# Patient Record
Sex: FEMALE | Race: WHITE | NOT HISPANIC OR LATINO | Employment: OTHER | URBAN - NONMETROPOLITAN AREA
[De-identification: names, ages, dates, MRNs, and addresses within clinical notes are randomized per-mention and may not be internally consistent; named-entity substitution may affect disease eponyms.]

---

## 2019-12-28 ENCOUNTER — APPOINTMENT (EMERGENCY)
Dept: RADIOLOGY | Facility: HOSPITAL | Age: 59
DRG: 602 | End: 2019-12-28
Payer: MEDICARE

## 2019-12-28 ENCOUNTER — HOSPITAL ENCOUNTER (INPATIENT)
Facility: HOSPITAL | Age: 59
LOS: 14 days | Discharge: NURSING, REHAB OR LONG-TERM CARE | DRG: 602 | End: 2020-01-11
Attending: EMERGENCY MEDICINE | Admitting: INTERNAL MEDICINE
Payer: MEDICARE

## 2019-12-28 ENCOUNTER — APPOINTMENT (EMERGENCY)
Dept: CT IMAGING | Facility: HOSPITAL | Age: 59
DRG: 602 | End: 2019-12-28
Payer: MEDICARE

## 2019-12-28 ENCOUNTER — APPOINTMENT (INPATIENT)
Dept: CT IMAGING | Facility: HOSPITAL | Age: 59
DRG: 602 | End: 2019-12-28
Payer: MEDICARE

## 2019-12-28 DIAGNOSIS — G47.33 OSA (OBSTRUCTIVE SLEEP APNEA): ICD-10-CM

## 2019-12-28 DIAGNOSIS — J96.01 ACUTE RESPIRATORY FAILURE WITH HYPOXIA AND HYPERCAPNIA (HCC): ICD-10-CM

## 2019-12-28 DIAGNOSIS — R77.8 ELEVATED TROPONIN LEVEL: ICD-10-CM

## 2019-12-28 DIAGNOSIS — J96.02 ACUTE RESPIRATORY FAILURE WITH HYPOXIA AND HYPERCAPNIA (HCC): ICD-10-CM

## 2019-12-28 DIAGNOSIS — L02.91 SOFT TISSUE ABSCESS: ICD-10-CM

## 2019-12-28 DIAGNOSIS — R94.31 PROLONGED QT INTERVAL: ICD-10-CM

## 2019-12-28 DIAGNOSIS — E03.9 ACQUIRED HYPOTHYROIDISM: ICD-10-CM

## 2019-12-28 DIAGNOSIS — R23.8 SKIN BREAKDOWN: ICD-10-CM

## 2019-12-28 DIAGNOSIS — E66.01 MORBID OBESITY WITH BMI OF 70 AND OVER, ADULT (HCC): ICD-10-CM

## 2019-12-28 DIAGNOSIS — E66.9 OBESITY: ICD-10-CM

## 2019-12-28 DIAGNOSIS — R60.9 EDEMA: ICD-10-CM

## 2019-12-28 DIAGNOSIS — L03.115 CELLULITIS OF RIGHT LEG: Primary | ICD-10-CM

## 2019-12-28 DIAGNOSIS — E53.8 FOLIC ACID DEFICIENCY: ICD-10-CM

## 2019-12-28 PROBLEM — R41.82 ALTERED MENTAL STATUS: Status: ACTIVE | Noted: 2019-12-28

## 2019-12-28 PROBLEM — I87.8 VENOUS STASIS OF LOWER EXTREMITY: Status: ACTIVE | Noted: 2019-12-28

## 2019-12-28 PROBLEM — I89.0 LYMPHEDEMA: Status: ACTIVE | Noted: 2019-12-28

## 2019-12-28 PROBLEM — R74.01 TRANSAMINITIS: Status: ACTIVE | Noted: 2019-12-28

## 2019-12-28 PROBLEM — L03.90 CELLULITIS: Status: ACTIVE | Noted: 2019-12-28

## 2019-12-28 LAB
ALBUMIN SERPL BCP-MCNC: 2.5 G/DL (ref 3.5–5)
ALP SERPL-CCNC: 112 U/L (ref 46–116)
ALT SERPL W P-5'-P-CCNC: 176 U/L (ref 12–78)
ANION GAP SERPL CALCULATED.3IONS-SCNC: 7 MMOL/L (ref 4–13)
APTT PPP: 38 SECONDS (ref 23–37)
AST SERPL W P-5'-P-CCNC: 355 U/L (ref 5–45)
BACTERIA UR QL AUTO: ABNORMAL /HPF
BASOPHILS # BLD AUTO: 0.07 THOUSANDS/ΜL (ref 0–0.1)
BASOPHILS NFR BLD AUTO: 1 % (ref 0–1)
BILIRUB SERPL-MCNC: 1.76 MG/DL (ref 0.2–1)
BILIRUB UR QL STRIP: ABNORMAL
BUN SERPL-MCNC: 16 MG/DL (ref 5–25)
CALCIUM SERPL-MCNC: 8.2 MG/DL (ref 8.3–10.1)
CHLORIDE SERPL-SCNC: 98 MMOL/L (ref 100–108)
CLARITY UR: ABNORMAL
CO2 SERPL-SCNC: 34 MMOL/L (ref 21–32)
COLOR UR: YELLOW
CREAT SERPL-MCNC: 1.3 MG/DL (ref 0.6–1.3)
EOSINOPHIL # BLD AUTO: 0.23 THOUSAND/ΜL (ref 0–0.61)
EOSINOPHIL NFR BLD AUTO: 2 % (ref 0–6)
ERYTHROCYTE [DISTWIDTH] IN BLOOD BY AUTOMATED COUNT: 16.2 % (ref 11.6–15.1)
FLUAV RNA NPH QL NAA+PROBE: NORMAL
FLUBV RNA NPH QL NAA+PROBE: NORMAL
GFR SERPL CREATININE-BSD FRML MDRD: 45 ML/MIN/1.73SQ M
GLUCOSE SERPL-MCNC: 93 MG/DL (ref 65–140)
GLUCOSE UR STRIP-MCNC: NEGATIVE MG/DL
HCT VFR BLD AUTO: 42.7 % (ref 34.8–46.1)
HGB BLD-MCNC: 12 G/DL (ref 11.5–15.4)
HGB UR QL STRIP.AUTO: ABNORMAL
IMM GRANULOCYTES # BLD AUTO: 0.03 THOUSAND/UL (ref 0–0.2)
IMM GRANULOCYTES NFR BLD AUTO: 0 % (ref 0–2)
INR PPP: 1.31 (ref 0.84–1.19)
KETONES UR STRIP-MCNC: NEGATIVE MG/DL
LACTATE SERPL-SCNC: 1.8 MMOL/L (ref 0.5–2)
LEUKOCYTE ESTERASE UR QL STRIP: ABNORMAL
LYMPHOCYTES # BLD AUTO: 0.91 THOUSANDS/ΜL (ref 0.6–4.47)
LYMPHOCYTES NFR BLD AUTO: 9 % (ref 14–44)
MCH RBC QN AUTO: 23.9 PG (ref 26.8–34.3)
MCHC RBC AUTO-ENTMCNC: 28.1 G/DL (ref 31.4–37.4)
MCV RBC AUTO: 85 FL (ref 82–98)
MONOCYTES # BLD AUTO: 0.81 THOUSAND/ΜL (ref 0.17–1.22)
MONOCYTES NFR BLD AUTO: 8 % (ref 4–12)
NEUTROPHILS # BLD AUTO: 7.64 THOUSANDS/ΜL (ref 1.85–7.62)
NEUTS SEG NFR BLD AUTO: 80 % (ref 43–75)
NITRITE UR QL STRIP: NEGATIVE
NON-SQ EPI CELLS URNS QL MICRO: ABNORMAL /HPF
NRBC BLD AUTO-RTO: 0 /100 WBCS
PH UR STRIP.AUTO: 6.5 [PH]
PLATELET # BLD AUTO: 318 THOUSANDS/UL (ref 149–390)
PLATELET # BLD AUTO: 373 THOUSANDS/UL (ref 149–390)
PMV BLD AUTO: 10.1 FL (ref 8.9–12.7)
PMV BLD AUTO: 9.8 FL (ref 8.9–12.7)
POTASSIUM SERPL-SCNC: 3.9 MMOL/L (ref 3.5–5.3)
PROT SERPL-MCNC: 8 G/DL (ref 6.4–8.2)
PROT UR STRIP-MCNC: NEGATIVE MG/DL
PROTHROMBIN TIME: 16.3 SECONDS (ref 11.6–14.5)
RBC # BLD AUTO: 5.02 MILLION/UL (ref 3.81–5.12)
RBC #/AREA URNS AUTO: ABNORMAL /HPF
RSV RNA NPH QL NAA+PROBE: NORMAL
SODIUM SERPL-SCNC: 139 MMOL/L (ref 136–145)
SP GR UR STRIP.AUTO: <=1.005 (ref 1–1.03)
TROPONIN I SERPL-MCNC: 0.1 NG/ML
URINE COMMENT: ABNORMAL
UROBILINOGEN UR QL STRIP.AUTO: 1 E.U./DL
WBC # BLD AUTO: 9.69 THOUSAND/UL (ref 4.31–10.16)
WBC #/AREA URNS AUTO: ABNORMAL /HPF

## 2019-12-28 PROCEDURE — 85730 THROMBOPLASTIN TIME PARTIAL: CPT | Performed by: EMERGENCY MEDICINE

## 2019-12-28 PROCEDURE — 87040 BLOOD CULTURE FOR BACTERIA: CPT | Performed by: EMERGENCY MEDICINE

## 2019-12-28 PROCEDURE — 70450 CT HEAD/BRAIN W/O DYE: CPT

## 2019-12-28 PROCEDURE — 85610 PROTHROMBIN TIME: CPT | Performed by: EMERGENCY MEDICINE

## 2019-12-28 PROCEDURE — 85049 AUTOMATED PLATELET COUNT: CPT | Performed by: INTERNAL MEDICINE

## 2019-12-28 PROCEDURE — 85025 COMPLETE CBC W/AUTO DIFF WBC: CPT | Performed by: EMERGENCY MEDICINE

## 2019-12-28 PROCEDURE — 81001 URINALYSIS AUTO W/SCOPE: CPT | Performed by: INTERNAL MEDICINE

## 2019-12-28 PROCEDURE — 93005 ELECTROCARDIOGRAM TRACING: CPT

## 2019-12-28 PROCEDURE — 87631 RESP VIRUS 3-5 TARGETS: CPT | Performed by: EMERGENCY MEDICINE

## 2019-12-28 PROCEDURE — 99223 1ST HOSP IP/OBS HIGH 75: CPT | Performed by: FAMILY MEDICINE

## 2019-12-28 PROCEDURE — 96360 HYDRATION IV INFUSION INIT: CPT

## 2019-12-28 PROCEDURE — 71046 X-RAY EXAM CHEST 2 VIEWS: CPT

## 2019-12-28 PROCEDURE — 73701 CT LOWER EXTREMITY W/DYE: CPT

## 2019-12-28 PROCEDURE — 36415 COLL VENOUS BLD VENIPUNCTURE: CPT | Performed by: EMERGENCY MEDICINE

## 2019-12-28 PROCEDURE — 80053 COMPREHEN METABOLIC PANEL: CPT | Performed by: EMERGENCY MEDICINE

## 2019-12-28 PROCEDURE — 99285 EMERGENCY DEPT VISIT HI MDM: CPT

## 2019-12-28 PROCEDURE — 84484 ASSAY OF TROPONIN QUANT: CPT | Performed by: EMERGENCY MEDICINE

## 2019-12-28 PROCEDURE — 99285 EMERGENCY DEPT VISIT HI MDM: CPT | Performed by: EMERGENCY MEDICINE

## 2019-12-28 PROCEDURE — 87086 URINE CULTURE/COLONY COUNT: CPT | Performed by: INTERNAL MEDICINE

## 2019-12-28 PROCEDURE — 83605 ASSAY OF LACTIC ACID: CPT | Performed by: EMERGENCY MEDICINE

## 2019-12-28 RX ORDER — MELATONIN
2000 DAILY
COMMUNITY

## 2019-12-28 RX ORDER — CEFTRIAXONE 1 G/50ML
1000 INJECTION, SOLUTION INTRAVENOUS EVERY 24 HOURS
Status: DISCONTINUED | OUTPATIENT
Start: 2019-12-29 | End: 2019-12-28

## 2019-12-28 RX ORDER — HYDROCODONE BITARTRATE AND ACETAMINOPHEN 5; 325 MG/1; MG/1
1 TABLET ORAL EVERY 6 HOURS PRN
COMMUNITY
End: 2020-01-11 | Stop reason: HOSPADM

## 2019-12-28 RX ORDER — LEVOTHYROXINE SODIUM 0.12 MG/1
125 TABLET ORAL DAILY
COMMUNITY
End: 2020-01-11 | Stop reason: HOSPADM

## 2019-12-28 RX ORDER — VANCOMYCIN HYDROCHLORIDE 1 G/200ML
1000 INJECTION, SOLUTION INTRAVENOUS ONCE
Status: COMPLETED | OUTPATIENT
Start: 2019-12-28 | End: 2019-12-28

## 2019-12-28 RX ORDER — BUPROPION HYDROCHLORIDE 300 MG/1
300 TABLET ORAL DAILY
COMMUNITY
End: 2020-01-11 | Stop reason: HOSPADM

## 2019-12-28 RX ORDER — CEFTRIAXONE 1 G/50ML
1000 INJECTION, SOLUTION INTRAVENOUS ONCE
Status: COMPLETED | OUTPATIENT
Start: 2019-12-28 | End: 2019-12-28

## 2019-12-28 RX ORDER — SODIUM CHLORIDE 9 MG/ML
100 INJECTION, SOLUTION INTRAVENOUS CONTINUOUS
Status: DISCONTINUED | OUTPATIENT
Start: 2019-12-28 | End: 2019-12-29

## 2019-12-28 RX ORDER — TRAMADOL HYDROCHLORIDE 50 MG/1
50 TABLET ORAL EVERY 12 HOURS PRN
COMMUNITY
End: 2020-01-11 | Stop reason: HOSPADM

## 2019-12-28 RX ORDER — FLUOXETINE 20 MG/1
20 TABLET, FILM COATED ORAL DAILY
COMMUNITY
End: 2020-06-01

## 2019-12-28 RX ADMIN — SODIUM CHLORIDE 100 ML/HR: 0.9 INJECTION, SOLUTION INTRAVENOUS at 18:00

## 2019-12-28 RX ADMIN — SODIUM CHLORIDE 1000 ML: 0.9 INJECTION, SOLUTION INTRAVENOUS at 12:59

## 2019-12-28 RX ADMIN — VANCOMYCIN HYDROCHLORIDE 1000 MG: 1 INJECTION, SOLUTION INTRAVENOUS at 15:06

## 2019-12-28 RX ADMIN — CEFTRIAXONE 1000 MG: 1 INJECTION, SOLUTION INTRAVENOUS at 14:29

## 2019-12-28 RX ADMIN — IOHEXOL 100 ML: 350 INJECTION, SOLUTION INTRAVENOUS at 22:30

## 2019-12-28 RX ADMIN — Medication 3.38 G: at 18:49

## 2019-12-28 NOTE — ED PROVIDER NOTES
History  Chief Complaint   Patient presents with    Lethargy     pt's  called ems stating pt has been slurring speech and increased weakness for past couple days  pt being tx per leg wounds by VN  denies fevers/n/v/d     Recently in the hospital for a right leg infection  Brought in now by ambulance secondary to mental status change  Periods of confusion with slurred speech  Complains of increased redness and swelling to the leg  No fevers or chills  No nausea vomiting or diarrhea  History provided by:  Patient, EMS personnel and spouse   used: No    Extremity Weakness   Severity:  Mild  Onset quality:  Gradual  Duration:  2 days  Timing:  Intermittent  Progression:  Waxing and waning  Associated symptoms: no abdominal pain, no chest pain, no cough, no diarrhea, no ear pain, no fever, no headaches, no nausea, no rash, no shortness of breath, no sore throat, no vomiting and no wheezing        None       Past Medical History:   Diagnosis Date    Disease of thyroid gland     Lymphedema     Morbid obesity due to excess calories ([address] )     Renal disorder        History reviewed  No pertinent surgical history  History reviewed  No pertinent family history  I have reviewed and agree with the history as documented  Social History     Tobacco Use    Smoking status: Never Smoker    Smokeless tobacco: Never Used   Substance Use Topics    Alcohol use: Not Currently    Drug use: Never        Review of Systems   Constitutional: Negative for chills and fever  HENT: Negative for ear pain, hearing loss, sore throat, trouble swallowing and voice change  Eyes: Negative for pain and discharge  Respiratory: Negative for cough, shortness of breath and wheezing  Cardiovascular: Negative for chest pain and palpitations  Gastrointestinal: Negative for abdominal pain, blood in stool, constipation, diarrhea, nausea and vomiting     Genitourinary: Negative for dysuria, flank pain, frequency and hematuria  Musculoskeletal: Positive for extremity weakness  Negative for joint swelling, neck pain and neck stiffness  Skin: Negative for rash and wound  Neurological: Negative for dizziness, seizures, syncope, facial asymmetry and headaches  Psychiatric/Behavioral: Negative for hallucinations, self-injury and suicidal ideas  All other systems reviewed and are negative  Physical Exam  Physical Exam   Constitutional: She is oriented to person, place, and time  She appears well-developed  Morbidly obese  Smelling of urine  HENT:   Head: Normocephalic and atraumatic  Eyes: Pupils are equal, round, and reactive to light  EOM are normal    Neck: Normal range of motion  Neck supple  Cardiovascular: Normal rate  Pulmonary/Chest: Breath sounds normal  She is in respiratory distress  Abdominal: Soft  She exhibits no distension  There is no tenderness  Pannus is slightly erythematous  Musculoskeletal:   Edema to both legs  There is erythema and warmth from the midportion of the thigh down to the lower leg on the right  Neurological: She is alert and oriented to person, place, and time  No cranial nerve deficit  Skin: Skin is warm and dry  Capillary refill takes less than 2 seconds  Psychiatric: She has a normal mood and affect         Vital Signs  ED Triage Vitals [12/28/19 1221]   Temperature Pulse Respirations Blood Pressure SpO2   97 8 °F (36 6 °C) 78 22 116/57 93 %      Temp Source Heart Rate Source Patient Position - Orthostatic VS BP Location FiO2 (%)   Oral Monitor -- -- --      Pain Score       No Pain           Vitals:    12/28/19 1221   BP: 116/57   Pulse: 78         Visual Acuity  Visual Acuity      Most Recent Value   L Pupil Size (mm)  2   R Pupil Size (mm)  2          ED Medications  Medications   cefTRIAXone (ROCEPHIN) IVPB (premix) 1,000 mg (has no administration in time range)   vancomycin 1 mg for Intravitreal Injection Only 0 1 mL (has no administration in time range)   sodium chloride 0 9 % bolus 1,000 mL (1,000 mL Intravenous New Bag 12/28/19 1259)       Diagnostic Studies  Results Reviewed     Procedure Component Value Units Date/Time    Influenza A/B and RSV PCR [[id number]]  (Normal) Collected:  12/28/19 1250    Lab Status:  Final result Specimen:  Nasopharyngeal Swab Updated:  12/28/19 1403     INFLUENZA A PCR None Detected     INFLUENZA B PCR None Detected     RSV PCR None Detected    Lactic acid, plasma [[id number]]  (Normal) Collected:  12/28/19 1250    Lab Status:  Final result Specimen:  Blood from Arm, Left Updated:  12/28/19 1326     LACTIC ACID 1 8 mmol/L     Narrative:       Result may be elevated if tourniquet was used during collection      Troponin I [[id number]]  (Abnormal) Collected:  12/28/19 1250    Lab Status:  Final result Specimen:  Blood from Arm, Left Updated:  12/28/19 1324     Troponin I 0 10 ng/mL     Protime-INR [[id number]]  (Abnormal) Collected:  12/28/19 1250    Lab Status:  Final result Specimen:  Blood from Arm, Left Updated:  12/28/19 1323     Protime 16 3 seconds      INR 1 31    APTT [[id number]]  (Abnormal) Collected:  12/28/19 1250    Lab Status:  Final result Specimen:  Blood from Arm, Left Updated:  12/28/19 1323     PTT 38 seconds     Comprehensive metabolic panel [[id number]]  (Abnormal) Collected:  12/28/19 1250    Lab Status:  Final result Specimen:  Blood from Arm, Left Updated:  12/28/19 1321     Sodium 139 mmol/L      Potassium 3 9 mmol/L      Chloride 98 mmol/L      CO2 34 mmol/L      ANION GAP 7 mmol/L      BUN 16 mg/dL      Creatinine 1 30 mg/dL      Glucose 93 mg/dL      Calcium 8 2 mg/dL       U/L       U/L      Alkaline Phosphatase 112 U/L      Total Protein 8 0 g/dL      Albumin 2 5 g/dL      Total Bilirubin 1 76 mg/dL      eGFR 45 ml/min/1 73sq m     Narrative:       [place] guidelines for Chronic Kidney Disease (CKD):     Stage 1 with normal or high GFR (GFR > 90 mL/min/1 73 square meters)    Stage 2 Mild CKD (GFR = 60-89 mL/min/1 73 square meters)    Stage 3A Moderate CKD (GFR = 45-59 mL/min/1 73 square meters)    Stage 3B Moderate CKD (GFR = 30-44 mL/min/1 73 square meters)    Stage 4 Severe CKD (GFR = 15-29 mL/min/1 73 square meters)    Stage 5 End Stage CKD (GFR <15 mL/min/1 73 square meters)  Note: GFR calculation is accurate only with a steady state creatinine    CBC and differential [[id number]]  (Abnormal) Collected:  12/28/19 1250    Lab Status:  Final result Specimen:  Blood from Arm, Left Updated:  12/28/19 1303     WBC 9 69 Thousand/uL      RBC 5 02 Million/uL      Hemoglobin 12 0 g/dL      Hematocrit 42 7 %      MCV 85 fL      MCH 23 9 pg      MCHC 28 1 g/dL      RDW 16 2 %      MPV 10 1 fL      Platelets 617 Thousands/uL      nRBC 0 /100 WBCs      Neutrophils Relative 80 %      Immat GRANS % 0 %      Lymphocytes Relative 9 %      Monocytes Relative 8 %      Eosinophils Relative 2 %      Basophils Relative 1 %      Neutrophils Absolute 7 64 Thousands/µL      Immature Grans Absolute 0 03 Thousand/uL      Lymphocytes Absolute 0 91 Thousands/µL      Monocytes Absolute 0 81 Thousand/µL      Eosinophils Absolute 0 23 Thousand/µL      Basophils Absolute 0 07 Thousands/µL     Blood culture #1 [[id number]] Collected:  12/28/19 1250    Lab Status: In process Specimen:  Blood from Arm, Left Updated:  12/28/19 1301    Blood culture #2 [[id number]] Collected:  12/28/19 1250    Lab Status:   In process Specimen:  Blood from Arm, Right Updated:  12/28/19 1301    UA w Reflex to Microscopic w Reflex to Culture [[id number]]     Lab Status:  No result Specimen:  Urine, Straight Cath                  XR chest 2 views   ED Interpretation by Charlette Valenzuela MD (12/28 7306)   NAD      CT head without contrast   Final Result by Gideon Krabbe, MD (12/28 3528)      No acute intracranial hemorrhage, mass effect or edema                  Workstation performed: [id number] Procedures  ECG 12 Lead Documentation Only  Date/Time: 12/28/2019 12:29 PM  Performed by: Isauro Mccain MD  Authorized by: Isauro Mccain MD     ECG reviewed by me, the ED Provider: yes    Patient location:  ED and bedside  Previous ECG:     Previous ECG:  Unavailable  Rate:     ECG rate:  80  Rhythm:     Rhythm: sinus rhythm    Ectopy:     Ectopy: none    QRS:     QRS axis:  Right  T waves:     T waves: inverted      Inverted:  II, III, aVF, V5, V4, V3, V2 and V1             ED Course                               MDM      Disposition  Final diagnoses:   Cellulitis of right leg     Time reflects when diagnosis was documented in both MDM as applicable and the Disposition within this note     Time User Action Codes Description Comment    12/28/2019  2:06 PM Sherita Cristopher Add [L03 [id number]] Cellulitis of right leg       ED Disposition     ED Disposition Condition Date/Time Comment    Admit Stable Sat Dec 28, 2019  2:05 PM Case was discussed with Dr Fredy Angela and the patient's admission status was agreed to be to the service of Dr Fredy Steward    None         Patient's Medications    No medications on file     No discharge procedures on file      ED Provider  Electronically Signed by           Isauro Mccain MD  12/28/19 4130

## 2019-12-28 NOTE — ED NOTES
Wound care provided to b/l lower extrmeities, removed old soiled dressings, cleansed leg and wounds with nss, pat dry, skin is very dry , flaky, red and swollen, xeroform applied to wound bed, ABD and wrapped with parish, assistance needed x 2 for wound care due to pt  Immobility , pt   Tolerated well     Maulik Donaldson RN  12/28/19 1780

## 2019-12-28 NOTE — ASSESSMENT & PLAN NOTE
Status post evaluated by wound care  Continue antibiotic therapy  Continue wound care recommendations  Patient recent venous duplex which was done on October 5, 2019= 6 normal  Follow up blood culture, lactic acid 1 8  Follow CBC, CMP, magnesium, phosphate  Follow-up CT scan of TPN fibula

## 2019-12-28 NOTE — H&P
H&P- Zully Alicea 1960, 61 y o  female MRN: [id number]    Unit/Bed#: -01 Encounter: [id number]    Primary Care Provider: Franki Tierney DO   Date and time admitted to hospital: 12/28/2019 12:14 PM        * Cellulitis  Assessment & Plan  Status post evaluated by wound care  Continue antibiotic therapy  Continue wound care recommendations  Patient recent venous duplex which was done on October 5, 2019= 6 normal  Follow up blood culture, lactic acid 1 8  Follow CBC, CMP, magnesium, phosphate  Follow-up CT scan of TPN fibula    Altered mental status  Assessment & Plan  Unknown etiology  During physical exam, patient is alert and oriented  No focal deficit noted  Imaging reviewed-nothing significant    Super obesity  Assessment & Plan  Low fat, low salt  Follow nutritional consult    Transaminitis  Assessment & Plan  , , total bilirubin 1 76  Full hepatitis panel    Venous stasis of lower extremity  Assessment & Plan  In both lower extremities  Continue treatment as per wound care management    Lymphedema  Assessment & Plan  Chronic in nature  Continue current treatment and management          VTE Prophylaxis: Enoxaparin (Lovenox)  / sequential compression device   Code Status: level1    Discussion with family: none    Anticipated Length of Stay:  Patient will be admitted on an Inpatient basis with an anticipated length of stay of  > 2 midnights  Justification for Hospital Stay: cellulitis, venous stasis    Total Time for Visit, including Counseling / Coordination of Care: 45 minutes  Greater than 50% of this total time spent on direct patient counseling and coordination of care  Chief Complaint:   Cellulitis, altered mental status    History of Present Illness:    Zully Alicea is a 61 y o  female who presents with confusion drainage in the leg  Patient reports she has been suffering with leg swelling for a time    Follows up with wound Care doctor Dr Dhaval Regan (as per chart)  Patient reports she noticed some discharge, more redness and swelling as some pain  Denies any fever nausea vomiting, diarrhea  And as per note, as per patient's  patient was having some slurring his speech and weakness  But during exam patient denies any such things  Review of Systems:    Review of Systems   Constitutional: Negative for activity change, appetite change, chills, diaphoresis, fatigue, fever and unexpected weight change  HENT: Negative for facial swelling, hearing loss, tinnitus, trouble swallowing and voice change  Eyes: Negative for photophobia, pain, redness and visual disturbance  Respiratory: Negative for apnea, cough, choking, chest tightness, shortness of breath, wheezing and stridor  Cardiovascular: Negative for chest pain, palpitations and leg swelling  Gastrointestinal: Negative for abdominal distention, anal bleeding, blood in stool, constipation, diarrhea, nausea and rectal pain  Genitourinary: Negative for difficulty urinating, dyspareunia, flank pain, frequency, genital sores and hematuria  Musculoskeletal: Positive for gait problem  Skin: Positive for color change and wound  Neurological: Negative for dizziness, tremors, seizures, syncope, facial asymmetry, speech difficulty, weakness, light-headedness, numbness and headaches  Hematological: Negative for adenopathy  Psychiatric/Behavioral: Negative for agitation, behavioral problems and confusion  All other systems reviewed and are negative  Past Medical and Surgical History:     Past Medical History:   Diagnosis Date    Disease of thyroid gland     Lymphedema     Morbid obesity due to excess calories ([facility] [address] )     Renal disorder        History reviewed  No pertinent surgical history  Meds/Allergies:    Prior to Admission medications    Medication Sig Start Date End Date Taking?  Authorizing Provider   buPROPion (WELLBUTRIN XL) 300 mg 24 hr tablet Take 300 mg by mouth daily   Yes Historical Provider, MD   cholecalciferol (VITAMIN D3) 1,000 units tablet Take 2,000 Units by mouth daily   Yes Historical Provider, MD   FLUoxetine (PROzac) 20 MG tablet Take 20 mg by mouth daily   Yes Historical Provider, MD   HYDROcodone-acetaminophen (NORCO) 5-325 mg per tablet Take 1 tablet by mouth every 6 (six) hours as needed for pain   Yes Historical Provider, MD   levothyroxine 125 mcg tablet Take 125 mcg by mouth daily   Yes Historical Provider, MD   traMADol (ULTRAM) 50 mg tablet Take 50 mg by mouth every 12 (twelve) hours as needed for moderate pain   Yes Historical Provider, MD     I have reviewed home medications with patient personally  Allergies: No Known Allergies    Social History:     Marital Status: /Civil Union   Occupation: unknown  Patient Pre-hospital Living Situation: home  Patient Pre-hospital Level of Mobility: independent  Patient Pre-hospital Diet Restrictions: none  Substance Use History:   Social History     Substance and Sexual Activity   Alcohol Use Not Currently     Social History     Tobacco Use   Smoking Status Never Smoker   Smokeless Tobacco Never Used     Social History     Substance and Sexual Activity   Drug Use Never       Family History:    Father has heart diseas,    Physical Exam:     Vitals:   Blood Pressure: 132/74 (12/28/19 1610)  Pulse: 80 (12/28/19 1610)  Temperature: 98 1 °F (36 7 °C) (12/28/19 1610)  Temp Source: Oral (12/28/19 1610)  Respirations: 18 (12/28/19 1610)  Height: 5' 3" (160 cm) (12/28/19 1221)  Weight - Scale: (!) 194 kg (426 lb 9 6 oz) (12/28/19 1221)  SpO2: 96 % (12/28/19 1722)    Physical Exam   Constitutional: She is oriented to person, place, and time  No distress  HENT:   Mouth/Throat: Oropharynx is clear and moist  No oropharyngeal exudate  Eyes: Pupils are equal, round, and reactive to light  Conjunctivae and EOM are normal  No scleral icterus  Neck: No JVD present  Cardiovascular: Normal rate   Exam reveals no gallop and no friction rub  No murmur heard  Pulmonary/Chest: Effort normal and breath sounds normal  No respiratory distress  She has no wheezes  She has no rales  Abdominal: Soft  Bowel sounds are normal  She exhibits no distension and no mass  There is no tenderness  There is no guarding  Musculoskeletal: Normal range of motion  She exhibits no edema or tenderness  Neurological: She is alert and oriented to person, place, and time  She displays normal reflexes  No cranial nerve deficit  Coordination normal    Skin: Skin is warm  Capillary refill takes less than 2 seconds  Rash noted  There is erythema  Psychiatric: Her behavior is normal    Nursing note and vitals reviewed  Additional Data:     Lab Results: I have personally reviewed pertinent reports  Results from last 7 days   Lab Units 12/28/19  1250   WBC Thousand/uL 9 69   HEMOGLOBIN g/dL 12 0   HEMATOCRIT % 42 7   PLATELETS Thousands/uL 373   NEUTROS PCT % 80*   LYMPHS PCT % 9*   MONOS PCT % 8   EOS PCT % 2     Results from last 7 days   Lab Units 12/28/19  1250   SODIUM mmol/L 139   POTASSIUM mmol/L 3 9   CHLORIDE mmol/L 98*   CO2 mmol/L 34*   BUN mg/dL 16   CREATININE mg/dL 1 30   ANION GAP mmol/L 7   CALCIUM mg/dL 8 2*   ALBUMIN g/dL 2 5*   TOTAL BILIRUBIN mg/dL 1 76*   ALK PHOS U/L 112   ALT U/L 176*   AST U/L 355*   GLUCOSE RANDOM mg/dL 93     Results from last 7 days   Lab Units 12/28/19  1250   INR  1 31*             Results from last 7 days   Lab Units 12/28/19  1250   LACTIC ACID mmol/L 1 8       Imaging: I have personally reviewed pertinent reports        XR chest 2 views   ED Interpretation by Martín Mejia MD (12/28 9336)   NAD      CT head without contrast   Final Result by Paulino Pak MD (12/28 1951)      No acute intracranial hemorrhage, mass effect or edema                  Workstation performed: [id number]         CT tibia fibula right w contrast    (Results Pending)       EKG, Pathology, and Other Studies Reviewed on Admission:   · EKG: Reviewed    Allscripts / Epic Records Reviewed: Yes     ** Please Note: This note has been constructed using a voice recognition system   **

## 2019-12-28 NOTE — ASSESSMENT & PLAN NOTE
Unknown etiology  During physical exam, patient is alert and oriented  No focal deficit noted  Imaging reviewed-nothing significant

## 2019-12-29 PROBLEM — E66.01 MORBID OBESITY (HCC): Status: ACTIVE | Noted: 2019-12-28

## 2019-12-29 LAB
ALBUMIN SERPL BCP-MCNC: 1.9 G/DL (ref 3.5–5)
ALP SERPL-CCNC: 89 U/L (ref 46–116)
ALT SERPL W P-5'-P-CCNC: 124 U/L (ref 12–78)
ANION GAP SERPL CALCULATED.3IONS-SCNC: 3 MMOL/L (ref 4–13)
AST SERPL W P-5'-P-CCNC: 188 U/L (ref 5–45)
BASOPHILS # BLD AUTO: 0.05 THOUSANDS/ΜL (ref 0–0.1)
BASOPHILS NFR BLD AUTO: 1 % (ref 0–1)
BILIRUB SERPL-MCNC: 1.12 MG/DL (ref 0.2–1)
BUN SERPL-MCNC: 13 MG/DL (ref 5–25)
CALCIUM SERPL-MCNC: 7.4 MG/DL (ref 8.3–10.1)
CHLORIDE SERPL-SCNC: 102 MMOL/L (ref 100–108)
CO2 SERPL-SCNC: 33 MMOL/L (ref 21–32)
CREAT SERPL-MCNC: 1.17 MG/DL (ref 0.6–1.3)
EOSINOPHIL # BLD AUTO: 0.84 THOUSAND/ΜL (ref 0–0.61)
EOSINOPHIL NFR BLD AUTO: 10 % (ref 0–6)
ERYTHROCYTE [DISTWIDTH] IN BLOOD BY AUTOMATED COUNT: 15.9 % (ref 11.6–15.1)
GFR SERPL CREATININE-BSD FRML MDRD: 51 ML/MIN/1.73SQ M
GLUCOSE SERPL-MCNC: 86 MG/DL (ref 65–140)
HCT VFR BLD AUTO: 38.1 % (ref 34.8–46.1)
HGB BLD-MCNC: 10.6 G/DL (ref 11.5–15.4)
IMM GRANULOCYTES # BLD AUTO: 0.04 THOUSAND/UL (ref 0–0.2)
IMM GRANULOCYTES NFR BLD AUTO: 1 % (ref 0–2)
LYMPHOCYTES # BLD AUTO: 0.63 THOUSANDS/ΜL (ref 0.6–4.47)
LYMPHOCYTES NFR BLD AUTO: 7 % (ref 14–44)
MAGNESIUM SERPL-MCNC: 2 MG/DL (ref 1.6–2.6)
MCH RBC QN AUTO: 24.1 PG (ref 26.8–34.3)
MCHC RBC AUTO-ENTMCNC: 27.8 G/DL (ref 31.4–37.4)
MCV RBC AUTO: 87 FL (ref 82–98)
MONOCYTES # BLD AUTO: 0.68 THOUSAND/ΜL (ref 0.17–1.22)
MONOCYTES NFR BLD AUTO: 8 % (ref 4–12)
NEUTROPHILS # BLD AUTO: 6.37 THOUSANDS/ΜL (ref 1.85–7.62)
NEUTS SEG NFR BLD AUTO: 73 % (ref 43–75)
NRBC BLD AUTO-RTO: 0 /100 WBCS
PHOSPHATE SERPL-MCNC: 3 MG/DL (ref 2.7–4.5)
PLATELET # BLD AUTO: 301 THOUSANDS/UL (ref 149–390)
PMV BLD AUTO: 9.6 FL (ref 8.9–12.7)
POTASSIUM SERPL-SCNC: 3.5 MMOL/L (ref 3.5–5.3)
PROT SERPL-MCNC: 6.4 G/DL (ref 6.4–8.2)
RBC # BLD AUTO: 4.39 MILLION/UL (ref 3.81–5.12)
SODIUM SERPL-SCNC: 138 MMOL/L (ref 136–145)
TROPONIN I SERPL-MCNC: 0.04 NG/ML
WBC # BLD AUTO: 8.61 THOUSAND/UL (ref 4.31–10.16)

## 2019-12-29 PROCEDURE — 84100 ASSAY OF PHOSPHORUS: CPT | Performed by: INTERNAL MEDICINE

## 2019-12-29 PROCEDURE — 84484 ASSAY OF TROPONIN QUANT: CPT | Performed by: INTERNAL MEDICINE

## 2019-12-29 PROCEDURE — 85025 COMPLETE CBC W/AUTO DIFF WBC: CPT | Performed by: INTERNAL MEDICINE

## 2019-12-29 PROCEDURE — 83735 ASSAY OF MAGNESIUM: CPT | Performed by: INTERNAL MEDICINE

## 2019-12-29 PROCEDURE — 93005 ELECTROCARDIOGRAM TRACING: CPT

## 2019-12-29 PROCEDURE — 99233 SBSQ HOSP IP/OBS HIGH 50: CPT | Performed by: INTERNAL MEDICINE

## 2019-12-29 PROCEDURE — 80053 COMPREHEN METABOLIC PANEL: CPT | Performed by: INTERNAL MEDICINE

## 2019-12-29 RX ORDER — ACETAMINOPHEN 325 MG/1
650 TABLET ORAL EVERY 6 HOURS PRN
Status: DISCONTINUED | OUTPATIENT
Start: 2019-12-29 | End: 2019-12-31

## 2019-12-29 RX ORDER — ONDANSETRON 2 MG/ML
4 INJECTION INTRAMUSCULAR; INTRAVENOUS EVERY 8 HOURS PRN
Status: DISCONTINUED | OUTPATIENT
Start: 2019-12-29 | End: 2020-01-11 | Stop reason: HOSPADM

## 2019-12-29 RX ADMIN — Medication 3.38 G: at 05:21

## 2019-12-29 RX ADMIN — Medication 3.38 G: at 12:25

## 2019-12-29 RX ADMIN — VANCOMYCIN HYDROCHLORIDE 2000 MG: 1 INJECTION, POWDER, LYOPHILIZED, FOR SOLUTION INTRAVENOUS at 20:01

## 2019-12-29 RX ADMIN — Medication 3.38 G: at 23:49

## 2019-12-29 RX ADMIN — ENOXAPARIN SODIUM 40 MG: 40 INJECTION SUBCUTANEOUS at 09:12

## 2019-12-29 RX ADMIN — Medication 3.38 G: at 17:44

## 2019-12-29 RX ADMIN — ACETAMINOPHEN 650 MG: 325 TABLET ORAL at 10:15

## 2019-12-29 RX ADMIN — Medication 3.38 G: at 01:20

## 2019-12-29 RX ADMIN — SODIUM CHLORIDE 100 ML/HR: 0.9 INJECTION, SOLUTION INTRAVENOUS at 09:47

## 2019-12-29 NOTE — PLAN OF CARE
Problem: Potential for Falls  Goal: Patient will remain free of falls  Description  INTERVENTIONS:  - Assess patient frequently for physical needs  -  Identify cognitive and physical deficits and behaviors that affect risk of falls    -  M[place]estic fall precautions as indicated by assessment   - Educate patient/family on patient safety including physical limitations  - Instruct patient to call for assistance with activity based on assessment  - Modify environment to reduce risk of injury  - Consider OT/PT consult to assist with strengthening/mobility  Outcome: Not Progressing     Problem: Prexisting or High Potential for Compromised Skin Integrity  Goal: Skin integrity is maintained or improved  Description  INTERVENTIONS:  - Identify patients at risk for skin breakdown  - Assess and monitor skin integrity  - Assess and monitor nutrition and hydration status  - Monitor labs   - Assess for incontinence   - Turn and reposition patient  - Assist with mobility/ambulation  - Relieve pressure over bony prominences  - Avoid friction and shearing  - Provide appropriate hygiene as needed including keeping skin clean and dry  - Evaluate need for skin moisturizer/barrier cream  - Collaborate with interdisciplinary team   - Patient/family teaching  - Consider wound care consult   Outcome: Not Progressing

## 2019-12-29 NOTE — PROGRESS NOTES
Transferred patient to CT at approximately 2200 with the assist of three staff members and hover mat  Patient transported back to unit at approximately 2220  Hover mat utilized to transfer patient with assist of 4 staff members to bariatric bed, safety measures in place, bed in lowest locked position  During transport from one bed to the other patient's weight forced beds apart and patient was lowered to the floor while on inflated hover mat by 4 staff members  Patient assessed for injury, none apparent  Patient did not hit head  Patient denies pain/discomfort r/t fall  Patient assisted back to bed with use of hover alessandro and hover mat as well as 6 staff members  /75, MAP 93, P 75 Spo2 99% on 2L/O2 via NC, Resp  18 and Temp 98 1  Disccussed incident with RICK Tam  No further interventions required at this time

## 2019-12-29 NOTE — UTILIZATION REVIEW
Initial Clinical Review    Admission: Date/Time/Statement: Inpatient Admission Orders (From admission, onward)     Ordered        12/28/19 1407  Inpatient Admission (expected length of stay for this patient Order details is greater than two midnights)  Once                   Orders Placed This Encounter   Procedures    Inpatient Admission (expected length of stay for this patient Order details is greater than two midnights)     Standing Status:   Standing     Number of Occurrences:   1     Order Specific Question:   Admitting Physician     Answer:   Rebecca Patrick [[id number]]     Order Specific Question:   Level of Care     Answer:   Med Surg [[id number]]     Order Specific Question:   Estimated length of stay     Answer:   More than 2 Midnights     Order Specific Question:   Certification     Answer:   I certify that inpatient services are medically necessary for this patient for a duration of greater than two midnights  See H&P and MD Progress Notes for additional information about the patient's course of treatment  ED Arrival Information     Expected Arrival Acuity Means of Arrival Escorted By Service Admission Type    12/28/2019 12/28/2019 12:11 Urgent Ambulance [address] [address] Hospitalist Elective    Arrival Complaint    Weakness        Chief Complaint   Patient presents with    Lethargy     pt's  called ems stating pt has been slurring speech and increased weakness for past couple days  pt being tx per leg wounds by VN  denies fevers/n/v/d     Assessment/Plan: 60 yo f present with confusion, drainage form her R  leg with swelling  She is followed  By wound care as an OP  Sh notes increased redness and swelling with pain  Her  notes she was having some slurring of her speech and weakness  She denies this on exam      EXAM -  Morbidly obese  Smelling of urine  Pannus is slightly erythematous  Edema to both legs    There is erythema and warmth from the midportion of the thigh down to the lower leg on the right       Cellulitis  Assessment & Plan  Status post evaluated by wound care  Continue antibiotic therapy  Continue wound care recommendations  Patient recent venous duplex which was done on October 5, 2019= 6 normal  Follow up blood culture, lactic acid 1 8  Follow CBC, CMP, magnesium, phosphate  Follow-up CT scan of TPN fibula     Altered mental status  Assessment & Plan  Unknown etiology  During physical exam, patient is alert and oriented  No focal deficit noted  Imaging reviewed-nothing significant     Super obesity  Assessment & Plan  Low fat, low salt  Follow nutritional consult     Transaminitis  Assessment & Plan  , , total bilirubin 1 76  Full hepatitis panel     Venous stasis of lower extremity  Assessment & Plan  In both lower extremities  Continue treatment as per wound care management     Lymphedema  Assessment & Plan  Chronic in nature  Continue current treatment and management      ED Triage Vitals   Temperature Pulse Respirations Blood Pressure SpO2   12/28/19 1221 12/28/19 1221 12/28/19 1221 12/28/19 1221 12/28/19 1221   97 8 °F (36 6 °C) 78 22 116/57 93 %      Temp Source Heart Rate Source Patient Position - Orthostatic VS BP Location FiO2 (%)   12/28/19 1221 12/28/19 1221 12/28/19 1429 12/28/19 1429 --   Oral Monitor Lying Left arm       Pain Score       12/28/19 1221       No Pain        Wt Readings from Last 1 Encounters:   12/28/19 (!) 194 kg (426 lb 9 6 oz)     Additional Vital Signs:    Pertinent Labs/Diagnostic Test Results:   Results from last 7 days   Lab Units 12/29/19  0508 12/28/19  1841 12/28/19  1250   WBC Thousand/uL 8 61  --  9 69   HEMOGLOBIN g/dL 10 6*  --  12 0   HEMATOCRIT % 38 1  --  42 7   PLATELETS Thousands/uL 301 318 373   NEUTROS ABS Thousands/µL 6 37  --  7 64*       Results from last 7 days   Lab Units 12/29/19  0508 12/28/19  1250   SODIUM mmol/L 138 139   POTASSIUM mmol/L 3 5 3 9   CHLORIDE mmol/L 102 98*   CO2 mmol/L 33* 34*   ANION GAP mmol/L 3* 7   BUN mg/dL 13 16   CREATININE mg/dL 1 17 1 30   EGFR ml/min/1 73sq m 51 45   CALCIUM mg/dL 7 4* 8 2*   MAGNESIUM mg/dL 2 0  --    PHOSPHORUS mg/dL 3 0  --      Results from last 7 days   Lab Units 12/29/19  0508 12/28/19  1250   AST U/L 188* 355*   ALT U/L 124* 176*   ALK PHOS U/L 89 112   TOTAL PROTEIN g/dL 6 4 8 0   ALBUMIN g/dL 1 9* 2 5*   TOTAL BILIRUBIN mg/dL 1 12* 1 76*         Results from last 7 days   Lab Units 12/29/19  0508 12/28/19  1250   GLUCOSE RANDOM mg/dL 86 93       Results from last 7 days   Lab Units 12/29/19  1213 12/28/19  1250   TROPONIN I ng/mL 0 04 0 10*       Results from last 7 days   Lab Units 12/28/19  1250   LACTIC ACID mmol/L 1 8     Results from last 7 days   Lab Units 12/28/19  2039   CLARITY UA  Cloudy   COLOR UA  Yellow   SPEC GRAV UA  <=1 005   PH UA  6 5   GLUCOSE UA mg/dl Negative   KETONES UA mg/dl Negative   BLOOD UA  Trace-Intact*   PROTEIN UA mg/dl Negative   NITRITE UA  Negative   BILIRUBIN UA  Small*   UROBILINOGEN UA E U /dl 1 0   LEUKOCYTES UA  Large*   WBC UA /hpf 10-20*   RBC UA /hpf 4-10*   BACTERIA UA /hpf Moderate*   EPITHELIAL CELLS WET PREP /hpf Moderate*     Results from last 7 days   Lab Units 12/28/19  1250   INFLUENZA A PCR  None Detected   RSV PCR  None Detected     Results from last 7 days   Lab Units 12/28/19  1250   BLOOD CULTURE  Received in Microbiology Lab  Culture in Progress  Received in Microbiology Lab  Culture in Progress  CXR - 12/28 - no acute   CT Head - 12/28  No acute    Ct Tib/Fib - 12/29 - Extensive right lower extremity subcutaneous edema consistent with nonspecific cellulitis including a focal anteromedial subcutaneous 4 5 x 2 4 x 3 6 cm collection concerning for abscess formation  No underlying osseous destructive change to indicate osteomyelitis at this time      ED Treatment:   Medication Administration from 12/28/2019 1211 to 12/28/2019 1609       Date/Time Order Dose Route Action     12/28/2019 1259 sodium chloride 0 9 % bolus 1,000 mL 1,000 mL Intravenous New Bag     12/28/2019 1429 cefTRIAXone (ROCEPHIN) IVPB (premix) 1,000 mg 1,000 mg Intravenous New Bag     12/28/2019 1506 vancomycin (VANCOCIN) IVPB (premix) 1,000 mg 1,000 mg Intravenous New Bag        Past Medical History:   Diagnosis Date    Disease of thyroid gland     Lymphedema     Morbid obesity due to excess calories ([address] )     Renal disorder      Present on Admission:   Cellulitis   Lymphedema   Altered mental status      Admitting Diagnosis: Weakness [R53 1]  Cellulitis of right leg [L03 115]  Age/Sex: 61 y o  female  Admission Orders:  Scheduled Medications:    Medications:  enoxaparin 40 mg Subcutaneous Daily   piperacillin-tazobactam 3 375 g Intravenous Q6H     Continuous IV Infusions:    sodium chloride 100 mL/hr Intravenous Continuous     PRN Meds:    acetaminophen 650 mg Oral Q6H PRN   ondansetron 4 mg Intravenous Q8H PRN         Network Utilization Review Department  [email or web address]  ATTENTION: Please call with any questions or concerns to [phone] and carefully listen to the prompts so that you are directed to the right person  All voicemails are confidential   Aspen Peña all requests for admission clinical reviews, approved or denied determinations and any other requests to dedicated fax number below belonging to the campus where the patient is receiving treatment  List of dedicated fax numbers for the Facilities:  FACILITY NAME UR FAX NUMBER   ADMISSION DENIALS (Administrative/Medical Necessity) [phone]   [address] (Maternity/NICU/Pediatrics) [phone]   Gricleda Bianchi [phone]   Presley Arndt [phone]   Bernarda Becerra [phone]   Yashira [address] [phone]   [facility] [address] [facility]  [phone]     [address] [phone]

## 2019-12-29 NOTE — UTILIZATION REVIEW
Notification of Inpatient Admission/Inpatient Authorization Request   This is a Notification of Inpatient Admission for [address]  Be advised that this patient was admitted to our facility under Inpatient Status  Contact Mary Lou Fernández at [phone] for additional admission information  [facility] [address] [facility] DEPT  DEDICATED [phone]  Patient Name:   Mohit Pascal   YOB: 1960       [address]   [address]:   [address]  Tax ID: [id number]  NPI: [id number] Attending Provider/NPI: Enzo Tao Md [[id number]]   Place of Service Code: 24     Place of Service Name:  [address] [place] [address]   Start Date: 12/28/19 1407     Discharge Date & Time: No discharge date for patient encounter  Type of Admission: Inpatient Status Discharge Disposition (if discharged): Final discharge disposition not confirmed   Patient Diagnoses: Weakness [R53 1]  Cellulitis of right leg [L03 115]     Orders: Admission Orders (From admission, onward)     Ordered        12/28/19 1407  Inpatient Admission (expected length of stay for this patient Order details is greater than two midnights)  Once                    Assigned Utilization Review Contact: Mary Lou Fernández  Utilization   Network Utilization Review Department  Phone: [phone]; Fax [phone]  Email: [email or web address]   ATTENTION PAYERS: Please call the assigned Utilization  directly with any questions or concerns ALL voicemails in the department are confidential  Send all requests for admission clinical reviews, approved or denied determinations and any other requests to dedicated fax number belonging to the campus where the patient is receiving treatment

## 2019-12-29 NOTE — CONSULTS
Consultation - General Surgery   Carito Dyson 61 y o  female MRN: [id number]  Unit/Bed#: -01 Encounter: [id number]    Assessment/Plan     Assessment:  Bilateral lower leg venous ulcers  cellulitis lower legs  Abscess right thigh  Morbid obesity  Mental status change  Calf pain    Plan:  I recommend local wound care to lower legs, packing to the right thigh,  Venous doppler to rule out DVT  Continue with antibiotics    History of Present Illness     HPI:  Carito Dyson is a 61 y o  female who presents with mental status change  She is n ow alert and oriented x3  She complains of a lot of pain in the left calf and left foot area  She states that her bilateral legs got more red and she has had multiple problems with lower leg ulcers and cellulitis in the past   She states that she does have a wound of the right-sided finger packing  She does notice drainage from the wounds  The patient is morbidly obese and is unable to move well at all on her own  She denies any paresthesias  She has no other complaints on today's visit       Consults    Review of Systems   Constitutional: Negative  HENT: Negative  Eyes: Negative  Respiratory: Negative  Cardiovascular: Negative  Gastrointestinal: Negative  Endocrine: Negative  Historical Information   Past Medical History:   Diagnosis Date    Disease of thyroid gland     Lymphedema     Morbid obesity due to excess calories ([address] )     Renal disorder      History reviewed  No pertinent surgical history    Social History   Social History     Substance and Sexual Activity   Alcohol Use Not Currently     Social History     Substance and Sexual Activity   Drug Use Never     Social History     Tobacco Use   Smoking Status Never Smoker   Smokeless Tobacco Never Used     Family History: non-contributory    Meds/Allergies   all current active meds have been reviewed  No Known Allergies    Objective   First Vitals:   Blood Pressure: 116/57 (12/28/19 1221)  Pulse: 78 (12/28/19 1221)  Temperature: 97 8 °F (36 6 °C) (12/28/19 1221)  Temp Source: Oral (12/28/19 1221)  Respirations: 22 (12/28/19 1221)  Height: 5' 3" (160 cm) (12/28/19 1221)  Weight - Scale: (!) 194 kg (426 lb 9 6 oz) (12/28/19 1221)  SpO2: 93 % (12/28/19 1221)    Current Vitals:   Blood Pressure: 126/64 (12/29/19 1049)  Pulse: 76 (12/29/19 1049)  Temperature: 98 °F (36 7 °C) (12/29/19 1049)  Temp Source: Oral (12/28/19 1610)  Respirations: 16 (12/29/19 1049)  Height: 5' 3" (160 cm) (12/28/19 1221)  Weight - Scale: (!) 194 kg (426 lb 9 6 oz) (12/28/19 1221)  SpO2: 92 % (12/29/19 1049)      Intake/Output Summary (Last 24 hours) at 12/29/2019 1201  Last data filed at 12/29/2019 0950  Gross per 24 hour   Intake 2730 ml   Output    Net 2730 ml       Invasive Devices     Peripheral Intravenous Line            Peripheral IV 12/28/19 Left Antecubital less than 1 day    Peripheral IV 12/28/19 Right Forearm less than 1 day                Physical Exam   Constitutional: She appears well-developed  HENT:   Head: Normocephalic and atraumatic  Eyes: Pupils are equal, round, and reactive to light  EOM are normal    Neck: Normal range of motion  Neck supple  Cardiovascular: Normal rate  Pulmonary/Chest: Effort normal    Musculoskeletal: Normal range of motion  She exhibits edema and tenderness  Skin: Skin is warm  Right thigh with a 0 4 cm x 2 cm deep wound with packing in place  The packing is removed and there was a small amount of seropurulent drainage noted  The right lower leg has multiple small open ulcers noted with some serosanguineous drainage and superiorly drainage noted from each of them  The left lower leg posterior has a larger area of ulceration  There is some serosanguineous drainage noted from this area as well  None of these areas are extremely deep  There is no evidence of tunneling of any of the wounds      Lower extremities bilaterally reveals cellulitis and venous stasis changes with a large amount of dry skin extending from the upper thighs down to the feet  Peripheral pulses are very difficult to assess secondary to the patient's morbid obesity  Her feet are warm and her legs are warm  There is no evidence of any cyanosis  Lab Results:   I have personally reviewed pertinent lab results  , CBC:   Lab Results   Component Value Date    WBC 8 61 12/29/2019    HGB 10 6 (L) 12/29/2019    HCT 38 1 12/29/2019    MCV 87 12/29/2019     12/29/2019    MCH 24 1 (L) 12/29/2019    MCHC 27 8 (L) 12/29/2019    RDW 15 9 (H) 12/29/2019    MPV 9 6 12/29/2019    NRBC 0 12/29/2019   , CMP:   Lab Results   Component Value Date    SODIUM 138 12/29/2019    K 3 5 12/29/2019     12/29/2019    CO2 33 (H) 12/29/2019    BUN 13 12/29/2019    CREATININE 1 17 12/29/2019    CALCIUM 7 4 (L) 12/29/2019     (H) 12/29/2019     (H) 12/29/2019    ALKPHOS 89 12/29/2019    EGFR 51 12/29/2019     Imaging: I have personally reviewed pertinent reports  EKG, Pathology, and Other Studies: I have personally reviewed pertinent reports  Counseling / Coordination of Care  Total floor / unit time spent today 35 minutes  Greater than 50% of total time was spent with the patient and / or family counseling and / or coordination of care  A description of the counseling / coordination of care: as noted above,  Venous doppler check and local wound care

## 2019-12-30 ENCOUNTER — APPOINTMENT (INPATIENT)
Dept: NON INVASIVE DIAGNOSTICS | Facility: HOSPITAL | Age: 59
DRG: 602 | End: 2019-12-30
Payer: MEDICARE

## 2019-12-30 LAB
ALBUMIN SERPL BCP-MCNC: 2 G/DL (ref 3.5–5)
ALP SERPL-CCNC: 82 U/L (ref 46–116)
ALT SERPL W P-5'-P-CCNC: 97 U/L (ref 12–78)
ANION GAP SERPL CALCULATED.3IONS-SCNC: 1 MMOL/L (ref 4–13)
AST SERPL W P-5'-P-CCNC: 110 U/L (ref 5–45)
ATRIAL RATE: 74 BPM
ATRIAL RATE: 81 BPM
BACTERIA UR CULT: NORMAL
BASOPHILS # BLD AUTO: 0.08 THOUSANDS/ΜL (ref 0–0.1)
BASOPHILS NFR BLD AUTO: 1 % (ref 0–1)
BILIRUB SERPL-MCNC: 0.74 MG/DL (ref 0.2–1)
BUN SERPL-MCNC: 13 MG/DL (ref 5–25)
CALCIUM SERPL-MCNC: 7.4 MG/DL (ref 8.3–10.1)
CHLORIDE SERPL-SCNC: 103 MMOL/L (ref 100–108)
CO2 SERPL-SCNC: 36 MMOL/L (ref 21–32)
CREAT SERPL-MCNC: 1.25 MG/DL (ref 0.6–1.3)
EOSINOPHIL # BLD AUTO: 0.89 THOUSAND/ΜL (ref 0–0.61)
EOSINOPHIL NFR BLD AUTO: 14 % (ref 0–6)
ERYTHROCYTE [DISTWIDTH] IN BLOOD BY AUTOMATED COUNT: 15.9 % (ref 11.6–15.1)
GFR SERPL CREATININE-BSD FRML MDRD: 47 ML/MIN/1.73SQ M
GLUCOSE SERPL-MCNC: 91 MG/DL (ref 65–140)
HCT VFR BLD AUTO: 37.6 % (ref 34.8–46.1)
HGB BLD-MCNC: 10.2 G/DL (ref 11.5–15.4)
IMM GRANULOCYTES # BLD AUTO: 0.02 THOUSAND/UL (ref 0–0.2)
IMM GRANULOCYTES NFR BLD AUTO: 0 % (ref 0–2)
LYMPHOCYTES # BLD AUTO: 0.93 THOUSANDS/ΜL (ref 0.6–4.47)
LYMPHOCYTES NFR BLD AUTO: 15 % (ref 14–44)
MCH RBC QN AUTO: 24 PG (ref 26.8–34.3)
MCHC RBC AUTO-ENTMCNC: 27.1 G/DL (ref 31.4–37.4)
MCV RBC AUTO: 89 FL (ref 82–98)
MONOCYTES # BLD AUTO: 0.8 THOUSAND/ΜL (ref 0.17–1.22)
MONOCYTES NFR BLD AUTO: 13 % (ref 4–12)
NEUTROPHILS # BLD AUTO: 3.69 THOUSANDS/ΜL (ref 1.85–7.62)
NEUTS SEG NFR BLD AUTO: 57 % (ref 43–75)
NRBC BLD AUTO-RTO: 0 /100 WBCS
P AXIS: 29 DEGREES
P AXIS: 73 DEGREES
PLATELET # BLD AUTO: 313 THOUSANDS/UL (ref 149–390)
PMV BLD AUTO: 9.7 FL (ref 8.9–12.7)
POTASSIUM SERPL-SCNC: 3.7 MMOL/L (ref 3.5–5.3)
PR INTERVAL: 170 MS
PR INTERVAL: 194 MS
PROT SERPL-MCNC: 6.6 G/DL (ref 6.4–8.2)
QRS AXIS: 101 DEGREES
QRS AXIS: 87 DEGREES
QRSD INTERVAL: 108 MS
QRSD INTERVAL: 108 MS
QT INTERVAL: 436 MS
QT INTERVAL: 472 MS
QTC INTERVAL: 483 MS
QTC INTERVAL: 548 MS
RBC # BLD AUTO: 4.25 MILLION/UL (ref 3.81–5.12)
SODIUM SERPL-SCNC: 140 MMOL/L (ref 136–145)
T WAVE AXIS: 190 DEGREES
T WAVE AXIS: 89 DEGREES
VENTRICULAR RATE: 74 BPM
VENTRICULAR RATE: 81 BPM
WBC # BLD AUTO: 6.41 THOUSAND/UL (ref 4.31–10.16)

## 2019-12-30 PROCEDURE — 85025 COMPLETE CBC W/AUTO DIFF WBC: CPT | Performed by: INTERNAL MEDICINE

## 2019-12-30 PROCEDURE — 93970 EXTREMITY STUDY: CPT

## 2019-12-30 PROCEDURE — 99233 SBSQ HOSP IP/OBS HIGH 50: CPT | Performed by: INTERNAL MEDICINE

## 2019-12-30 PROCEDURE — 80053 COMPREHEN METABOLIC PANEL: CPT | Performed by: INTERNAL MEDICINE

## 2019-12-30 PROCEDURE — 93970 EXTREMITY STUDY: CPT | Performed by: SURGERY

## 2019-12-30 PROCEDURE — 99221 1ST HOSP IP/OBS SF/LOW 40: CPT | Performed by: PHYSICIAN ASSISTANT

## 2019-12-30 RX ORDER — NYSTATIN 100000 [USP'U]/G
POWDER TOPICAL 2 TIMES DAILY
Status: DISCONTINUED | OUTPATIENT
Start: 2019-12-30 | End: 2020-01-11 | Stop reason: HOSPADM

## 2019-12-30 RX ADMIN — NYSTATIN: 100000 POWDER TOPICAL at 12:37

## 2019-12-30 RX ADMIN — NYSTATIN: 100000 POWDER TOPICAL at 21:16

## 2019-12-30 RX ADMIN — Medication 3.38 G: at 20:27

## 2019-12-30 RX ADMIN — ENOXAPARIN SODIUM 40 MG: 40 INJECTION SUBCUTANEOUS at 09:25

## 2019-12-30 RX ADMIN — VANCOMYCIN HYDROCHLORIDE 2000 MG: 1 INJECTION, POWDER, LYOPHILIZED, FOR SOLUTION INTRAVENOUS at 07:06

## 2019-12-30 RX ADMIN — Medication 3.38 G: at 12:37

## 2019-12-30 RX ADMIN — VANCOMYCIN HYDROCHLORIDE 2000 MG: 1 INJECTION, POWDER, LYOPHILIZED, FOR SOLUTION INTRAVENOUS at 22:20

## 2019-12-30 RX ADMIN — Medication 3.38 G: at 05:31

## 2019-12-30 NOTE — CONSULTS
Consult Note - Wound   Miladis Leach 61 y o  female MRN: [id number]  Unit/Bed#: -01 Encounter: [id number]      History and Present Illness:    Patient is a 61year old female admitted for cellulitis  Wound care is consulted for multiple venous wounds and moisture associated skin breakdown  Patient lives at home and son is the caretaker, patient is alert and oriented with very little mobility and is morbidly obese  Pt is occasionally incontinent  Wounds are currently being managed by [address] [place] [address] and patient receives visiting nurses and nurses aides  Patient states that she's had these wounds "for a while" and is unsure where they began  Patient is a three assist for turning and is not able to assist with turns or assessment  Assessment Findings:     1  Bilateral breasts: moist, yeasty and reddened skin  No open draining areas    2  Abdomen: partial thickness wound underneath the right side of pannus  Skin is moist, red and fungal across the underside of pannus  3  Right thigh medial: full thickness wound that has significant depth and moderate drainage  Patient is unsure where this wound came from  4  Right posterior knee: moist, red and fungal  No open areas visualized at this time but patient claims there is a wound providers have been packing in this location  5  Right lower leg: multiple full thickness wounds present  May be a mix of venous and arterial  Wounds had serous crust and debris in the wound bed  Skin is extremely dry with thick scaly buildup present  6  Left lower leg: multiple full thickness wounds present  May be a mix of venous and arterial  Wounds had serous crust and debris in the wound bed  Skin is extremely dry with thick scaly buildup present  7  Left foot dorsal: ecchymosis and erythema, possibly traumatic from the fall during bed transfer   Patient unsure if it was there when she came into the hospital      8  Right outer buttocks: small scabbed dry abrasion that is stable  Unsure where this was acquired from, patient is unsure  Wound Care    Bilateral lower legs: wash legs with soap and water and a towel avoiding wounds to remove dead skin build up  Irrigate wounds with normal saline and pat dry  Apply thick layer of Silvasorb gel directly to wound base or apply to gauze then place directly on wound  Cover with 4x4 gauze and an ABD then secure with Kerlix and paper tape  Change once daily or as needed for strike through drainage  Right medial thigh: Apply skin prep to periwound and allow to dry  Loosely tuck 1/4" Iodoform packing strip into wound and use a small piece of tape to secure tail to skin  Cover with Allevyn non bordered foam and change daily or as needed for strike through drainage  Right posterior knee: Wash with soap and water then pat dry  Apply Maxorb Ag Rope to to inner aspect of knee, Change daily or as needed for strikethrough drainage  Right Lower Abdomen: Irrigate with normal saline then pat dry  Line underside of pannus with Maxorb Ag Rope and change daily  Wash skin folds with soap and water then pat dry  Dust skin folds underneath breasts, bilateral groin and torso with Nystatin powder three times daily  Apply Lac-Hydrin (ammonium lactate) lotion to bilateral legs avoiding wound base one to two times a day  Skin care Plan:  1-Protect sacrum w/Allevyn foam, luis enrique with P, change q3d and PRN, check skin q-shift  2-Turn/reposition q2h or when medically stable for pressure re-distribution on skin   3-Elevate heels to offload pressure]  4-Moisturize skin daily with skin nourishing cream  5-Ehob cushion in chair when out of bed  6-Hydraguard to bilateral heels BID and PRN  Wound 12/28/19 Venous stasis ulcer Leg Right; Lower;Medial (Active)   Wound Description Beefy red;Dry 12/29/2019 11:50 PM   Edel-wound Assessment Edema; Erythema;Dry; Other (Comment) 12/29/2019 11:50 PM   Drainage Amount Scant 12/29/2019 11:50 PM   Drainage Description Foul smelling;Yellow 12/28/2019  8:00 PM   Dressing Open to air 12/29/2019 11:50 PM   Dressing Status Clean;Dry; Intact 12/30/2019  9:27 AM       Wound 12/28/19 Leg Right; Lower (Active)   Wound Image    12/30/2019  8:17 AM   Wound Description Beefy red;Dry;Yellow;Drainage 12/30/2019  8:17 AM   Edel-wound Assessment Edema; Erythema;Dry;Hyperpigmented 12/30/2019  8:17 AM   Wound Length (cm) 15 cm 12/30/2019  8:17 AM   Wound Width (cm) 6 cm 12/30/2019  8:17 AM   Wound Depth (cm) 0 2 12/30/2019  8:17 AM   Calculated Wound Area (cm^2) 90 cm^2 12/30/2019  8:17 AM   Calculated Wound Volume (cm^3) 18 cm^3 12/30/2019  8:17 AM   Drainage Amount Small 12/30/2019  8:17 AM   Drainage Description Serosanguineous 12/30/2019  8:17 AM   Non-staged Wound Description Full thickness 12/30/2019  8:17 AM   Treatments Irrigation with NSS 12/30/2019  8:17 AM   Dressing Gauze;Silvasorb gel;ABD 12/30/2019  8:17 AM   Dressing Changed New 12/30/2019  8:17 AM   Patient Tolerance Tolerated well 12/30/2019  8:17 AM   Dressing Status Clean;Dry; Intact 12/30/2019  9:27 AM       Wound 12/28/19 Pressure Injury Foot Left;Upper (Active)   Wound Image   12/30/2019  8:16 AM   Wound Description Light purple;Fragile 12/30/2019  8:16 AM   Staging Deep Tissue Injury 12/30/2019  8:16 AM   Edel-wound Assessment Erythema;Edema 12/30/2019  8:16 AM   Closure None 12/30/2019  8:16 AM   Drainage Amount None 12/30/2019  8:16 AM   Dressing Open to air 12/30/2019  8:16 AM   Dressing Status Clean;Dry; Intact 12/30/2019  9:27 AM       Wound 12/28/19 Leg Left; Lower (Active)   Wound Image    12/30/2019  8:16 AM   Wound Description Beefy red;Yellow;Drainage;Dry 12/28/2019  8:00 PM   Edel-wound Assessment Other (Comment);Edema; Erythema;Dry 12/28/2019  8:00 PM   Dressing Xeroform;Gauze; Other (Comment) 12/28/2019  8:00 PM   Dressing Status Clean; Intact;Dry 12/30/2019  9:27 AM       Wound 12/28/19 Venous stasis ulcer Leg Right; Inner; Other (Comment); Medial (Active)   Wound Image   12/30/2019  8:36 AM   Wound Description SABRINA 12/30/2019  8:36 AM   Edel-wound Assessment Induration;Erythema;Denuded 12/30/2019  8:36 AM   Wound Length (cm) 0 2 cm 12/30/2019  8:36 AM   Wound Width (cm) 0 2 cm 12/30/2019  8:36 AM   Wound Depth (cm) 3 5 12/30/2019  8:36 AM   Calculated Wound Area (cm^2) 0 04 cm^2 12/30/2019  8:36 AM   Calculated Wound Volume (cm^3) 0 14 cm^3 12/30/2019  8:36 AM   Change in Wound Size % 94 12 12/30/2019  8:36 AM   Tunneling 2 7 cm 12/29/2019  5:00 PM   Tunneling in depth located at 12 to 11 12/29/2019  5:00 PM   Drainage Amount Moderate 12/30/2019  8:36 AM   Drainage Description Serosanguineous 12/30/2019  8:36 AM   Non-staged Wound Description Full thickness 12/30/2019  8:36 AM   Wound packed? Yes 12/30/2019  8:36 AM   Packing- # removed 1 12/30/2019  8:36 AM   Packing- # inserted 1 12/30/2019  8:36 AM   Dressing Changed New 12/30/2019  8:36 AM   Patient Tolerance Tolerated well 12/30/2019  8:36 AM   Dressing Status Clean;Dry; Intact 12/30/2019  9:27 AM       Wound 12/30/19 Traumatic Abrasion(s) Buttocks Right; Outer (Active)   Wound Image   12/30/2019  8:22 AM   Wound Description Dry;Brown 12/30/2019  8:22 AM   Edel-wound Assessment Erythema;Dry 12/30/2019  8:22 AM   Wound Length (cm) 2 5 cm 12/30/2019  8:22 AM   Wound Width (cm) 1 cm 12/30/2019  8:22 AM   Wound Depth (cm) 0 12/30/2019  8:22 AM   Calculated Wound Area (cm^2) 2 5 cm^2 12/30/2019  8:22 AM   Calculated Wound Volume (cm^3) 0 cm^3 12/30/2019  8:22 AM   Drainage Amount None 12/30/2019  8:22 AM   Treatments Site care 12/30/2019  8:22 AM       Wound 12/30/19 Moisture associated skin damage Abdomen Anterior;Right (Active)   Wound Image   12/30/2019  8:23 AM   Wound Description Beefy red;Pink;Drainage 12/30/2019  8:23 AM   Edel-wound Assessment Edema; Erythema;Fragile 12/30/2019  8:23 AM   Wound Length (cm) 2 cm 12/30/2019  8:23 AM   Wound Width (cm) 1 cm 12/30/2019 8: 23 AM   Wound Depth (cm) 0 1 12/30/2019  8:23 AM   Calculated Wound Area (cm^2) 2 cm^2 12/30/2019  8:23 AM   Calculated Wound Volume (cm^3) 0 2 cm^3 12/30/2019  8:23 AM   Drainage Amount Small 12/30/2019  8:23 AM   Drainage Description Serosanguineous 12/30/2019  8:23 AM   Non-staged Wound Description Partial thickness 12/30/2019  8:23 AM   Dressing Other (Comment) 12/30/2019  8:23 AM   Dressing Changed New 12/30/2019  8:23 AM   Patient Tolerance Tolerated well 12/30/2019  8:23 AM   Dressing Status Clean;Dry; Intact 12/30/2019  9:27 AM     Wound Care will continue to follow  Call or Tiger text with any questions

## 2019-12-30 NOTE — MALNUTRITION/BMI
This medical record reflects one or more clinical indicators suggestive of morbid obesity  BMI Findings:  BMI Classifications: Morbid Obesity > 70     Body mass index is 75 57 kg/m²  Diet ed provided, see progress note  See Nutrition note dated 12/30/19 for additional details  Completed nutrition assessment is viewable in the nutrition documentation

## 2019-12-30 NOTE — NURSING NOTE
Patient sleeping in bed, nasal cannula re-adjusted and placed back on patient, patient c/o no pain at this time, vital signs are stable and IV flushed and infusing  Will continue to monitor    Radha Belle RN

## 2019-12-30 NOTE — PROGRESS NOTES
Progress Note - Ghazala Lau 1960, 61 y o  female MRN: [id number]    Unit/Bed#: -[facility] Encounter: [id number]    Primary Care Provider: Jamaal Rogers DO   Date and time admitted to hospital: 2019 12:14 PM    * Cellulitis  Assessment & Plan  Continue Zosyn and vancomycin  Continue wound care recommendations  Venous duplex with no significant acute finding  CT of right lower extremity showed small abscess collection  Surgery evaluated and suggested to continue antibiotics  Altered mental status  Assessment & Plan  Improved from admission day  No focal deficit noted  Most likely related with cellulitis  Imaging reviewed-nothing significant    Lymphedema  Assessment & Plan  Chronic in nature  Continue current treatment and management    Morbid obesity ([address] )  Assessment & Plan  Weight loss management as an outpatient    VTE Pharmacologic Prophylaxis:   Pharmacologic: Heparin    Patient Centered Rounds: I have performed bedside rounds with nursing staff today    Discussions with Specialists or Other Care Team Provider:     Education and Discussions with Family / Patient:     Current Length of Stay: 2 day(s)    Current Patient Status: Inpatient   Certification Statement: The patient will continue to require additional inpatient hospital stay due to Cellulitis    Discharge Plan:  3-4 days    Code Status: Level 1 - Full Code      Subjective:   No complaints    Objective:     Vitals:   Temp (24hrs), Av 1 °F (36 7 °C), Min:97 5 °F (36 4 °C), Max:98 6 °F (37 °C)    Temp:  [97 5 °F (36 4 °C)-98 6 °F (37 °C)] 98 6 °F (37 °C)  HR:  [69-80] 73  Resp:  [16-20] 20  BP: (118-129)/(56-73) 129/56  SpO2:  [95 %-99 %] 95 %  Body mass index is 75 57 kg/m²  Input and Output Summary (last 24 hours):        Intake/Output Summary (Last 24 hours) at 2019 1814  Last data filed at 2019 0020  Gross per 24 hour   Intake 359 ml   Output 200 ml   Net 159 ml       Physical Exam:     General appearance: alert, appears stated age and cooperative  Head: Normocephalic, without obvious abnormality, atraumatic  Lungs: clear to auscultation bilaterally  Heart: regular rate and rhythm  Abdomen: soft, non-tender, positive bowel sounds   Back: negative  Extremities: Bilateral lower extremity swelling multiple shallow ulcers, tenderness and swelling over the lower right posterior popliteal region  Neurologic: Alert and oriented X 3, normal strength and tone  Normal symmetric reflexes  Normal coordination and gait    Additional Data:     Labs:    Results from last 7 days   Lab Units 12/30/19  0626   WBC Thousand/uL 6 41   HEMOGLOBIN g/dL 10 2*   HEMATOCRIT % 37 6   PLATELETS Thousands/uL 313   NEUTROS PCT % 57   LYMPHS PCT % 15   MONOS PCT % 13*   EOS PCT % 14*     Results from last 7 days   Lab Units 12/30/19  0626   SODIUM mmol/L 140   POTASSIUM mmol/L 3 7   CHLORIDE mmol/L 103   CO2 mmol/L 36*   BUN mg/dL 13   CREATININE mg/dL 1 25   ANION GAP mmol/L 1*   CALCIUM mg/dL 7 4*   ALBUMIN g/dL 2 0*   TOTAL BILIRUBIN mg/dL 0 74   ALK PHOS U/L 82   ALT U/L 97*   AST U/L 110*   GLUCOSE RANDOM mg/dL 91     Results from last 7 days   Lab Units 12/28/19  1250   INR  1 31*             Results from last 7 days   Lab Units 12/28/19  1250   LACTIC ACID mmol/L 1 8           * I Have Reviewed All Lab Data Listed Above  * Additional Pertinent Lab Tests Reviewed: [address] Admission Reviewed    Imaging:    Imaging Reports Reviewed Today Include: images reviewed    Recent Cultures (last 7 days):     Results from last 7 days   Lab Units 12/28/19 2039 12/28/19  1250   BLOOD CULTURE   --  No Growth at 24 hrs  No Growth at 24 hrs     URINE CULTURE  30,000-39,000 cfu/ml   --        Last 24 Hours Medication List:     Current Facility-Administered Medications:  acetaminophen 650 mg Oral Q6H PRN RICK Barnes    enoxaparin 40 mg Subcutaneous Daily Huma Piedra MD    nystatin  Topical BID Huma Piedra MD    ondansetron 4 mg Intravenous Q8H PRN Vanessa Trammell, CRNP    piperacillin-tazobactam 3 375 g Intravenous Q6H Damaso Rivera MD Last Rate: 3 375 g (12/30/19 1237)   vancomycin 17 5 mg/kg (Adjusted) Intravenous Q12H Damaso Rivera MD Last Rate: 2,000 mg (12/30/19 0706)        Today, Patient Was Seen By: Damsao Rivera MD    ** Please Note: Dictation voice to text software may have been used in the creation of this document   **

## 2019-12-30 NOTE — PROGRESS NOTES
Progress Note - Anton Carson 1960, 61 y o  female MRN: [id number]    Unit/Bed#: -01 Encounter: [id number]    Primary Care Provider: Lj Guerra DO   Date and time admitted to hospital: 2019 12:14 PM    * Cellulitis  Assessment & Plan  Continue Zosyn and vancomycin  Continue wound care recommendations  Follow up blood culture, lactic acid 1 8  Follow CBC, CMP, magnesium, phosphate  CT scan of TPN fibula showed small abscess, surgical eval appreciated  Altered mental status  Assessment & Plan  Improved today  During physical exam, patient is alert and oriented  No focal deficit noted  Imaging reviewed-nothing significant    Lymphedema  Assessment & Plan  Chronic in nature  Continue current treatment and management    Morbid obesity (HCC)  Assessment & Plan  Low fat, low salt  Follow nutritional consult    Transaminitis  Assessment & Plan  , , total bilirubin 1 76  Full hepatitis panel    Venous stasis of lower extremity  Assessment & Plan  In both lower extremities  Continue treatment as per wound care management    VTE Pharmacologic Prophylaxis:   Pharmacologic: Enoxaparin (Lovenox)    Patient Centered Rounds: I have performed bedside rounds with nursing staff today    Discussions with Specialists or Other Care Team Provider:     Education and Discussions with Family / Patient:     Current Length of Stay: 1 day(s)    Current Patient Status: Inpatient   Certification Statement: The patient will continue to require additional inpatient hospital stay due to Cellulitis    Discharge Plan: In 3-4 days    Code Status: Level 1 - Full Code      Subjective:   No complaints    Objective:     Vitals:   Temp (24hrs), Av 1 °F (36 7 °C), Min:98 °F (36 7 °C), Max:98 1 °F (36 7 °C)    Temp:  [98 °F (36 7 °C)-98 1 °F (36 7 °C)] 98 1 °F (36 7 °C)  HR:  [74-76] 74  Resp:  [16-20] 20  BP: (115-128)/(62-75) 115/62  SpO2:  [92 %-99 %] 96 %  Body mass index is 75 57 kg/m²       Input and Output Summary (last 24 hours): Intake/Output Summary (Last 24 hours) at 12/29/2019 1922  Last data filed at 12/29/2019 2012  Gross per 24 hour   Intake 2730 ml   Output    Net 2730 ml       Physical Exam:     General appearance: alert, appears stated age and cooperative  Head: Normocephalic, without obvious abnormality, atraumatic  Lungs: clear to auscultation bilaterally  Heart: regular rate and rhythm  Abdomen: soft, non-tender, positive bowel sounds   Back: negative  Extremities: extremities atraumatic, no cyanosis or edema  Neurologic: Mental status: alertness: alert    Additional Data:     Labs:    Results from last 7 days   Lab Units 12/29/19  0508   WBC Thousand/uL 8 61   HEMOGLOBIN g/dL 10 6*   HEMATOCRIT % 38 1   PLATELETS Thousands/uL 301   NEUTROS PCT % 73   LYMPHS PCT % 7*   MONOS PCT % 8   EOS PCT % 10*     Results from last 7 days   Lab Units 12/29/19  0508   SODIUM mmol/L 138   POTASSIUM mmol/L 3 5   CHLORIDE mmol/L 102   CO2 mmol/L 33*   BUN mg/dL 13   CREATININE mg/dL 1 17   ANION GAP mmol/L 3*   CALCIUM mg/dL 7 4*   ALBUMIN g/dL 1 9*   TOTAL BILIRUBIN mg/dL 1 12*   ALK PHOS U/L 89   ALT U/L 124*   AST U/L 188*   GLUCOSE RANDOM mg/dL 86     Results from last 7 days   Lab Units 12/28/19  1250   INR  1 31*             Results from last 7 days   Lab Units 12/28/19  1250   LACTIC ACID mmol/L 1 8           * I Have Reviewed All Lab Data Listed Above  * Additional Pertinent Lab Tests Reviewed: [address] Admission Reviewed    Imaging:    Imaging Reports Reviewed Today Include: images reviewed    Recent Cultures (last 7 days):     Results from last 7 days   Lab Units 12/28/19  1250   BLOOD CULTURE  Received in Microbiology Lab  Culture in Progress  Received in Microbiology Lab  Culture in Progress         Last 24 Hours Medication List:     Current Facility-Administered Medications:  acetaminophen 650 mg Oral Q6H PRN Vanessa S Valeri, CRNP    enoxaparin 40 mg Subcutaneous Daily Ron Anguiano MD    ondansetron 4 mg Intravenous Q8H PRN RICK Barnes    piperacillin-tazobactam 3 375 g Intravenous Q6H Ron Anguiano MD Last Rate: 3 375 g (12/29/19 1744)   vancomycin 15 mg/kg Intravenous Q24H Ron Anguiano MD         Today, Patient Was Seen By: Ron Anguiano MD    ** Please Note: Dictation voice to text software may have been used in the creation of this document   **

## 2019-12-30 NOTE — PROGRESS NOTES
Vancomycin Assessment    Giuliano Gutierrez is a 61 y o  female who is currently receiving vancomycin 2000mg Q12hrs for skin-soft tissue infection     Relevant clinical data and objective history reviewed:  Creatinine   Date Value Ref Range Status   12/29/2019 1 17 0 60 - 1 30 mg/dL Final     Comment:     Standardized to IDMS reference method   12/28/2019 1 30 0 60 - 1 30 mg/dL Final     Comment:     Standardized to IDMS reference method     /73   Pulse 80   Temp 98 °F (36 7 °C)   Resp 18   Ht 5' 3" (1 6 m)   Wt (!) 194 kg (426 lb 9 6 oz)   SpO2 98%   BMI 75 57 kg/m²   I/O last 3 completed shifts: In: 0918 [P O :480; I V :2200; IV Piggyback:50]  Out: -   Lab Results   Component Value Date/Time    BUN 13 12/29/2019 05:08 AM    WBC 8 61 12/29/2019 05:08 AM    HGB 10 6 (L) 12/29/2019 05:08 AM    HCT 38 1 12/29/2019 05:08 AM    MCV 87 12/29/2019 05:08 AM     12/29/2019 05:08 AM     Temp Readings from Last 3 Encounters:   12/29/19 98 °F (36 7 °C)     Vancomycin Days of Therapy: 1    Assessment/Plan  The patient is currently on vancomycin utilizing scheduled dosing  Baseline risks associated with therapy include: pre-existing renal impairment, concomitant nephrotoxic medications and advanced age  The patient is receiving 2000mg Q12hrs with the most recent vancomycin level being not at steady-state and sub-therapeutic based on a goal of 15-20 (appropriate for most indications) ; therefore, is clinically appropriate and dose will be continued   Pharmacy will continue to follow closely for s/sx of nephrotoxicity, infusion reactions and appropriateness of therapy  BMP and CBC will be ordered per protocol  Plan for trough as patient approaches steady state, prior to the 4th  dose at approximately 0700 on 12/31  Pharmacy will continue to follow the patients culture results and clinical progress daily      Penelope Batista, Pharmacist

## 2019-12-30 NOTE — PROGRESS NOTES
Vancomycin IV Pharmacy-to-Dose Consultation  Galina Perry is a 61 y o  female who is currently receiving Vancomycin IV with management by the Pharmacy Consult service for the treatment of skin-soft tissue infection    Assessment/Plan:  The patient's chart was reviewed  Renal function is stable  There are no signs or symptoms of nephrotoxicity and/or infusion reactions documented  The following nephrotoxicity factors are present:  Medications: Zosyn, IV acyclovir, tenofovir, high-dose Bactrim, Aminoglycosides, amphotericin B, colistin/polymyxin B, vasopressors, diuretics, NSAIDs, ACEIs/ARBs, IV contrast, cyclosporine, tacrolimus, cisplatin, lithium, etc     Patient-Factors: elderly, dehydration, hypotension, renal dysfunction, blood loss (surgery)  Based on todays assessment, will continue current vancomycin (Day 2 ) dosing of 2000mg q12h, with a plan for trough to be drawn at 0700 on 12/31/19  We will continue to follow the patients culture results and clinical progress daily      Eliazar Goltz, ZuhairD  Pharmacist

## 2019-12-30 NOTE — PROGRESS NOTES
PT with multiple wounds, will order prosource BID to help meet PRO needs  Discussed wt management for home including reducing portion sizes, emphasizing fruits and vegetables and having three balanced meals to reduced excessive snacking at nighttime  Provided handout on Mediterranean diet and MyPlate  Answered all pt questions during visit  Will continue to monitor I/O, labs, weight, provide further diet ed as needed

## 2019-12-30 NOTE — PLAN OF CARE
Problem: Potential for Falls  Goal: Patient will remain free of falls  Description  INTERVENTIONS:  - Assess patient frequently for physical needs  -  Identify cognitive and physical deficits and behaviors that affect risk of falls    -  [place] fall precautions as indicated by assessment   - Educate patient/family on patient safety including physical limitations  - Instruct patient to call for assistance with activity based on assessment  - Modify environment to reduce risk of injury  - Consider OT/PT consult to assist with strengthening/mobility  Outcome: Progressing     Problem: Prexisting or High Potential for Compromised Skin Integrity  Goal: Skin integrity is maintained or improved  Description  INTERVENTIONS:  - Identify patients at risk for skin breakdown  - Assess and monitor skin integrity  - Assess and monitor nutrition and hydration status  - Monitor labs   - Assess for incontinence   - Turn and reposition patient  - Assist with mobility/ambulation  - Relieve pressure over bony prominences  - Avoid friction and shearing  - Provide appropriate hygiene as needed including keeping skin clean and dry  - Evaluate need for skin moisturizer/barrier cream  - Collaborate with interdisciplinary team   - Patient/family teaching  - Consider wound care consult   Outcome: Progressing

## 2019-12-30 NOTE — PROGRESS NOTES
Progress Note - General Surgery   Adelita Enmanuel 61 y o  female MRN: [id number]  Unit/Bed#: -01 Encounter: [id number]    Assessment:  Bilateral lower extremity cellulitis  Right thigh abscess s/p packing  Venous stasis bilateral lower extremities  Lymphedema  Super morbid obesity      Plan:  Wound care consult  Continue antibiotics per primary  Continue local wound care  Daily packing changes to right thigh  Venous duplex to r/o DVT  Elevate legs while in bed  Follow AM labs to include CBC and BMP  Nutrition is consulted  OOB to chair / ambulating ad lizzy      Subjective/Objective   Chief Complaint: I didn't sleep well    Subjective: didn't sleep well  Otherwise no complaints this AM  Discomfort to bilateral legs improving  Denies fevers/chills  Tolerating diet  Objective:     Blood pressure 118/65, pulse 75, temperature 97 5 °F (36 4 °C), resp  rate 18, height 5' 3" (1 6 m), weight (!) 194 kg (426 lb 9 6 oz), SpO2 99 %  ,Body mass index is 75 57 kg/m²  Intake/Output Summary (Last 24 hours) at 12/30/2019 0747  Last data filed at 12/30/2019 0020  Gross per 24 hour   Intake 1599 ml   Output 200 ml   Net 1399 ml       Invasive Devices     Peripheral Intravenous Line            Peripheral IV 12/28/19 Left Antecubital 1 day    Peripheral IV 12/28/19 Right Forearm 1 day                Physical Exam:   Gen: AxOx3  Heart: RRR  Lungs: CTA  Musculoskeletal: Edema and milt tenderness to bilateral lower legs    Right anterior thigh: 0 3 cm x 3 cm deep wound with packing in place  The packing is removed and there was a small amount of seropurulent drainage noted  Wound was then packed with 4 inches of 1/4 inch iodoform gauze    Right lower leg:  multiple small open ulcers noted with scant serosanguineous drainage   Left lower leg: posterior has a larger area of ulceration with serosanguineous drainage   Peripheral pulses are palpable at 1/5 but difficult to assess secondary to the patient's super morbid obesity  Capillary refill slightly delayed  Her bilateral legs are warm to touch          Lab, Imaging and other studies:   I have personally reviewed pertinent lab results      CBC:   Lab Results   Component Value Date    WBC 6 41 12/30/2019    HGB 10 2 (L) 12/30/2019    HCT 37 6 12/30/2019    MCV 89 12/30/2019     12/30/2019    MCH 24 0 (L) 12/30/2019    MCHC 27 1 (L) 12/30/2019    RDW 15 9 (H) 12/30/2019    MPV 9 7 12/30/2019    NRBC 0 12/30/2019   CMP:   Lab Results   Component Value Date    SODIUM 140 12/30/2019    K 3 7 12/30/2019     12/30/2019    CO2 36 (H) 12/30/2019    BUN 13 12/30/2019    CREATININE 1 25 12/30/2019    CALCIUM 7 4 (L) 12/30/2019     (H) 12/30/2019    ALT 97 (H) 12/30/2019    ALKPHOS 82 12/30/2019    EGFR 47 12/30/2019     VTE Pharmacologic Prophylaxis:  Enoxaparin (Lovenox)      Braden Perdue PA-C

## 2019-12-30 NOTE — ASSESSMENT & PLAN NOTE
Improved from admission day  No focal deficit noted  Most likely related with cellulitis    Imaging reviewed-nothing significant

## 2019-12-30 NOTE — PLAN OF CARE
Problem: Potential for Falls  Goal: Patient will remain free of falls  Description  INTERVENTIONS:  - Assess patient frequently for physical needs  -  Identify cognitive and physical deficits and behaviors that affect risk of falls    -  [place] fall precautions as indicated by assessment   - Educate patient/family on patient safety including physical limitations  - Instruct patient to call for assistance with activity based on assessment  - Modify environment to reduce risk of injury  - Consider OT/PT consult to assist with strengthening/mobility  Outcome: Progressing     Problem: Prexisting or High Potential for Compromised Skin Integrity  Goal: Skin integrity is maintained or improved  Description  INTERVENTIONS:  - Identify patients at risk for skin breakdown  - Assess and monitor skin integrity  - Assess and monitor nutrition and hydration status  - Monitor labs   - Assess for incontinence   - Turn and reposition patient  - Assist with mobility/ambulation  - Relieve pressure over bony prominences  - Avoid friction and shearing  - Provide appropriate hygiene as needed including keeping skin clean and dry  - Evaluate need for skin moisturizer/barrier cream  - Collaborate with interdisciplinary team   - Patient/family teaching  - Consider wound care consult   Outcome: Progressing

## 2019-12-30 NOTE — ASSESSMENT & PLAN NOTE
Continue Zosyn and vancomycin  Continue wound care recommendations  Patient recent venous duplex which was done on October 5, 2019= 6 normal  Follow up blood culture, lactic acid 1 8  Follow CBC, CMP, magnesium, phosphate  CT scan of TPN fibula showed small abscess, surgical eval appreciated

## 2019-12-30 NOTE — ASSESSMENT & PLAN NOTE
Improved today  During physical exam, patient is alert and oriented  No focal deficit noted  Imaging reviewed-nothing significant

## 2019-12-30 NOTE — DISCHARGE INSTR - DIET
Wound Care    Bilateral lower legs: wash legs with soap and water and a towel avoiding wounds to remove dead skin build up  Irrigate wounds with normal saline and pat dry  Apply thick layer of Silvasorb gel directly to wound base or apply to gauze then place directly on wound  Cover with 4x4 gauze and an ABD then secure with Kerlix and paper tape  Change once daily or as needed for strike through drainage  Right medial thigh: Apply skin prep to periwound and allow to dry  Loosely tuck 1/4" Iodoform packing strip into wound and use a small piece of tape to secure tail to skin  Cover with Allevyn non bordered foam and change daily or as needed for strike through drainage  Right posterior knee: Wash with soap and water then pat dry  Apply Maxorb Ag Rope to to inner aspect of knee, Change daily or as needed for strikethrough drainage  Right Lower Abdomen: Irrigate with normal saline then pat dry  Line underside of pannus with Maxorb Ag Rope and change daily  Wash skin folds with soap and water then pat dry  Dust skin folds underneath breasts, bilateral groin and torso with Nystatin powder three times daily  Apply Lac-Hydrin (ammonium lactate) lotion to bilateral legs avoiding wound base one to two times a day  Skin care Plan:  1-Protect sacrum w/Allevyn foam, luis enrique with P, change q3d and PRN, check skin q-shift  2-Turn/reposition q2h or when medically stable for pressure re-distribution on skin   3-Elevate heels to offload pressure]  4-Moisturize skin daily with skin nourishing cream  5-Ehob cushion in chair when out of bed    6-Hydraguard to bilateral heels BID and PRN

## 2019-12-30 NOTE — PLAN OF CARE
Problem: Potential for Falls  Goal: Patient will remain free of falls  Description  INTERVENTIONS:  - Assess patient frequently for physical needs  -  Identify cognitive and physical deficits and behaviors that affect risk of falls    -  [place] fall precautions as indicated by assessment   - Educate patient/family on patient safety including physical limitations  - Instruct patient to call for assistance with activity based on assessment  - Modify environment to reduce risk of injury  - Consider OT/PT consult to assist with strengthening/mobility  12/30/2019 1253 by Shelby Osgood, RN  Outcome: Progressing  12/30/2019 1252 by Shelby Osgood, RN  Outcome: Progressing     Problem: Prexisting or High Potential for Compromised Skin Integrity  Goal: Skin integrity is maintained or improved  Description  INTERVENTIONS:  - Identify patients at risk for skin breakdown  - Assess and monitor skin integrity  - Assess and monitor nutrition and hydration status  - Monitor labs   - Assess for incontinence   - Turn and reposition patient  - Assist with mobility/ambulation  - Relieve pressure over bony prominences  - Avoid friction and shearing  - Provide appropriate hygiene as needed including keeping skin clean and dry  - Evaluate need for skin moisturizer/barrier cream  - Collaborate with interdisciplinary team   - Patient/family teaching  - Consider wound care consult   12/30/2019 1253 by Shelby Osgood, RN  Outcome: Progressing  12/30/2019 1252 by Shelby Osgood, RN  Outcome: Progressing     Problem: PAIN - ADULT  Goal: Verbalizes/displays adequate comfort level or baseline comfort level  Description  Interventions:  - Encourage patient to monitor pain and request assistance  - Assess pain using appropriate pain scale  - Administer analgesics based on type and severity of pain and evaluate response  - Implement non-pharmacological measures as appropriate and evaluate response  - Consider cultural and social influences on pain and pain management  - Notify physician/advanced practitioner if interventions unsuccessful or patient reports new pain  Outcome: Progressing     Problem: INFECTION - ADULT  Goal: Absence or prevention of progression during hospitalization  Description  INTERVENTIONS:  - Assess and monitor for signs and symptoms of infection  - Monitor lab/diagnostic results  - Administer medications as ordered  - Instruct and encourage patient and family to use good hand hygiene technique  - Identify and instruct in appropriate isolation precautions for identified infection/condition   Outcome: Progressing     Problem: SAFETY ADULT  Goal: Maintain or return to baseline ADL function  Description  INTERVENTIONS:  -  Assess patient's ability to carry out ADLs; assess patient's baseline for ADL function and identify physical deficits which impact ability to perform ADLs (bathing, care of mouth/teeth, toileting, grooming, dressing, etc )  - Assess/evaluate cause of self-care deficits   - Assess range of motion  - Assess patient's mobility; develop plan if impaired  - Assess patient's need for assistive devices and provide as appropriate  - Encourage maximum independence but intervene and supervise when necessary  - Involve family in performance of ADLs  - Assess for home care needs following discharge   - Consider OT consult to assist with ADL evaluation and planning for discharge  - Provide patient education as appropriate  Outcome: Progressing  Goal: Maintain or return mobility status to optimal level  Description  INTERVENTIONS:  - Assess patient's baseline mobility status (ambulation, transfers, stairs, etc )    - Identify cognitive and physical deficits and behaviors that affect mobility  - Identify mobility aids required to assist with transfers and/or ambulation (gait belt, sit-to-stand, lift, walker, cane, etc )  - [place] fall precautions as indicated by assessment  - Record patient progress and toleration of activity level on Mobility SBAR; progress patient to next Phase/Stage  - Instruct patient to call for assistance with activity based on assessment  - Consider rehabilitation consult to assist with strengthening/weightbearing, etc   Outcome: Progressing     Problem: DISCHARGE PLANNING  Goal: Discharge to home or other facility with appropriate resources  Description  INTERVENTIONS:  - Identify barriers to discharge w/patient and caregiver  - Arrange for needed discharge resources and transportation as appropriate  - Identify discharge learning needs (meds, wound care, etc )  - Arrange for interpretive services to assist at discharge as needed  - Refer to Case Management Department for coordinating discharge planning if the patient needs post-hospital services based on physician/advanced practitioner order or complex needs related to functional status, cognitive ability, or social support system  Outcome: Progressing     Problem: Knowledge Deficit  Goal: Patient/family/caregiver demonstrates understanding of disease process, treatment plan, medications, and discharge instructions  Description  Complete learning assessment and assess knowledge base    Interventions:  - Provide teaching at level of understanding  - Provide teaching via preferred learning methods  Outcome: Progressing

## 2019-12-31 PROBLEM — M79.672 LEFT FOOT PAIN: Status: ACTIVE | Noted: 2019-12-31

## 2019-12-31 PROBLEM — R23.8 SKIN BREAKDOWN: Status: ACTIVE | Noted: 2019-12-31

## 2019-12-31 PROBLEM — R77.8 ELEVATED TROPONIN LEVEL: Status: ACTIVE | Noted: 2019-12-31

## 2019-12-31 PROBLEM — R79.89 ELEVATED TROPONIN LEVEL: Status: ACTIVE | Noted: 2019-12-31

## 2019-12-31 PROBLEM — L03.115 CELLULITIS OF RIGHT LOWER EXTREMITY: Status: ACTIVE | Noted: 2019-12-28

## 2019-12-31 PROBLEM — G93.41 ACUTE METABOLIC ENCEPHALOPATHY: Status: ACTIVE | Noted: 2019-12-28

## 2019-12-31 PROBLEM — E03.9 ACQUIRED HYPOTHYROIDISM: Status: ACTIVE | Noted: 2019-12-31

## 2019-12-31 LAB
ANION GAP SERPL CALCULATED.3IONS-SCNC: 3 MMOL/L (ref 4–13)
BUN SERPL-MCNC: 12 MG/DL (ref 5–25)
CALCIUM SERPL-MCNC: 7.6 MG/DL (ref 8.3–10.1)
CHLORIDE SERPL-SCNC: 102 MMOL/L (ref 100–108)
CO2 SERPL-SCNC: 35 MMOL/L (ref 21–32)
CREAT SERPL-MCNC: 1.04 MG/DL (ref 0.6–1.3)
GFR SERPL CREATININE-BSD FRML MDRD: 59 ML/MIN/1.73SQ M
GLUCOSE SERPL-MCNC: 84 MG/DL (ref 65–140)
POTASSIUM SERPL-SCNC: 3.8 MMOL/L (ref 3.5–5.3)
SODIUM SERPL-SCNC: 140 MMOL/L (ref 136–145)
VANCOMYCIN TROUGH SERPL-MCNC: 25.3 UG/ML (ref 10–20)

## 2019-12-31 PROCEDURE — G8988 SELF CARE GOAL STATUS: HCPCS

## 2019-12-31 PROCEDURE — G8987 SELF CARE CURRENT STATUS: HCPCS

## 2019-12-31 PROCEDURE — 97163 PT EVAL HIGH COMPLEX 45 MIN: CPT

## 2019-12-31 PROCEDURE — 80202 ASSAY OF VANCOMYCIN: CPT | Performed by: INTERNAL MEDICINE

## 2019-12-31 PROCEDURE — 80048 BASIC METABOLIC PNL TOTAL CA: CPT | Performed by: INTERNAL MEDICINE

## 2019-12-31 PROCEDURE — 97167 OT EVAL HIGH COMPLEX 60 MIN: CPT

## 2019-12-31 PROCEDURE — 99232 SBSQ HOSP IP/OBS MODERATE 35: CPT | Performed by: INTERNAL MEDICINE

## 2019-12-31 PROCEDURE — G8979 MOBILITY GOAL STATUS: HCPCS

## 2019-12-31 PROCEDURE — 87081 CULTURE SCREEN ONLY: CPT | Performed by: INTERNAL MEDICINE

## 2019-12-31 PROCEDURE — G8978 MOBILITY CURRENT STATUS: HCPCS

## 2019-12-31 RX ORDER — MELATONIN
1000 DAILY
Status: DISCONTINUED | OUTPATIENT
Start: 2019-12-31 | End: 2020-01-11 | Stop reason: HOSPADM

## 2019-12-31 RX ORDER — FLUOXETINE HYDROCHLORIDE 20 MG/1
20 CAPSULE ORAL DAILY
Status: DISCONTINUED | OUTPATIENT
Start: 2020-01-01 | End: 2020-01-11 | Stop reason: HOSPADM

## 2019-12-31 RX ORDER — LEVOTHYROXINE SODIUM 0.12 MG/1
125 TABLET ORAL
Status: DISCONTINUED | OUTPATIENT
Start: 2020-01-01 | End: 2020-01-11 | Stop reason: HOSPADM

## 2019-12-31 RX ADMIN — NYSTATIN: 100000 POWDER TOPICAL at 09:30

## 2019-12-31 RX ADMIN — VANCOMYCIN HYDROCHLORIDE 1500 MG: 5 INJECTION, POWDER, LYOPHILIZED, FOR SOLUTION INTRAVENOUS at 21:27

## 2019-12-31 RX ADMIN — Medication 3.38 G: at 13:02

## 2019-12-31 RX ADMIN — ENOXAPARIN SODIUM 60 MG: 60 INJECTION SUBCUTANEOUS at 21:27

## 2019-12-31 RX ADMIN — Medication 3.38 G: at 06:23

## 2019-12-31 RX ADMIN — NYSTATIN: 100000 POWDER TOPICAL at 19:24

## 2019-12-31 RX ADMIN — MELATONIN 1000 UNITS: at 19:24

## 2019-12-31 RX ADMIN — ENOXAPARIN SODIUM 40 MG: 40 INJECTION SUBCUTANEOUS at 09:30

## 2019-12-31 RX ADMIN — Medication 3.38 G: at 02:11

## 2019-12-31 RX ADMIN — Medication 3.38 G: at 19:24

## 2019-12-31 NOTE — SOCIAL WORK
CM placed a referral to [facility] in Health system  for [address] , as per pts request

## 2019-12-31 NOTE — PLAN OF CARE
Problem: Potential for Falls  Goal: Patient will remain free of falls  Description  INTERVENTIONS:  - Assess patient frequently for physical needs  -  Identify cognitive and physical deficits and behaviors that affect risk of falls    -  [place] fall precautions as indicated by assessment   - Educate patient/family on patient safety including physical limitations  - Instruct patient to call for assistance with activity based on assessment  - Modify environment to reduce risk of injury  - Consider OT/PT consult to assist with strengthening/mobility  Outcome: Progressing     Problem: Prexisting or High Potential for Compromised Skin Integrity  Goal: Skin integrity is maintained or improved  Description  INTERVENTIONS:  - Identify patients at risk for skin breakdown  - Assess and monitor skin integrity  - Assess and monitor nutrition and hydration status  - Monitor labs   - Assess for incontinence   - Turn and reposition patient  - Assist with mobility/ambulation  - Relieve pressure over bony prominences  - Avoid friction and shearing  - Provide appropriate hygiene as needed including keeping skin clean and dry  - Evaluate need for skin moisturizer/barrier cream  - Collaborate with interdisciplinary team   - Patient/family teaching  - Consider wound care consult   Outcome: Progressing     Problem: PAIN - ADULT  Goal: Verbalizes/displays adequate comfort level or baseline comfort level  Description  Interventions:  - Encourage patient to monitor pain and request assistance  - Assess pain using appropriate pain scale  - Administer analgesics based on type and severity of pain and evaluate response  - Implement non-pharmacological measures as appropriate and evaluate response  - Consider cultural and social influences on pain and pain management  - Notify physician/advanced practitioner if interventions unsuccessful or patient reports new pain  Outcome: Progressing     Problem: INFECTION - ADULT  Goal: Absence or prevention of progression during hospitalization  Description  INTERVENTIONS:  - Assess and monitor for signs and symptoms of infection  - Monitor lab/diagnostic results  - Administer medications as ordered  - Instruct and encourage patient and family to use good hand hygiene technique  - Identify and instruct in appropriate isolation precautions for identified infection/condition   Outcome: Progressing     Problem: SAFETY ADULT  Goal: Maintain or return to baseline ADL function  Description  INTERVENTIONS:  -  Assess patient's ability to carry out ADLs; assess patient's baseline for ADL function and identify physical deficits which impact ability to perform ADLs (bathing, care of mouth/teeth, toileting, grooming, dressing, etc )  - Assess/evaluate cause of self-care deficits   - Assess range of motion  - Assess patient's mobility; develop plan if impaired  - Assess patient's need for assistive devices and provide as appropriate  - Encourage maximum independence but intervene and supervise when necessary  - Involve family in performance of ADLs  - Assess for home care needs following discharge   - Consider OT consult to assist with ADL evaluation and planning for discharge  - Provide patient education as appropriate  Outcome: Progressing  Goal: Maintain or return mobility status to optimal level  Description  INTERVENTIONS:  - Assess patient's baseline mobility status (ambulation, transfers, stairs, etc )    - Identify cognitive and physical deficits and behaviors that affect mobility  - Identify mobility aids required to assist with transfers and/or ambulation (gait belt, sit-to-stand, lift, walker, cane, etc )  - [place] fall precautions as indicated by assessment  - Record patient progress and toleration of activity level on Mobility SBAR; progress patient to next Phase/Stage  - Instruct patient to call for assistance with activity based on assessment  - Consider rehabilitation consult to assist with strengthening/weightbearing, etc   Outcome: Progressing     Problem: DISCHARGE PLANNING  Goal: Discharge to home or other facility with appropriate resources  Description  INTERVENTIONS:  - Identify barriers to discharge w/patient and caregiver  - Arrange for needed discharge resources and transportation as appropriate  - Identify discharge learning needs (meds, wound care, etc )  - Arrange for interpretive services to assist at discharge as needed  - Refer to Case Management Department for coordinating discharge planning if the patient needs post-hospital services based on physician/advanced practitioner order or complex needs related to functional status, cognitive ability, or social support system  Outcome: Progressing     Problem: Knowledge Deficit  Goal: Patient/family/caregiver demonstrates understanding of disease process, treatment plan, medications, and discharge instructions  Description  Complete learning assessment and assess knowledge base    Interventions:  - Provide teaching at level of understanding  - Provide teaching via preferred learning methods  Outcome: Progressing

## 2019-12-31 NOTE — PLAN OF CARE
Problem: PHYSICAL THERAPY ADULT  Goal: Performs mobility at highest level of function for planned discharge setting  See evaluation for individualized goals  Description  Treatment/Interventions: Functional transfer training, LE strengthening/ROM, Elevations, Therapeutic exercise, Endurance training, Patient/family training, Equipment eval/education, Bed mobility, Gait training, Spoke to nursing, Spoke to case management, OT  Equipment Recommended: [place], Wheelchair       See flowsheet documentation for full assessment, interventions and recommendations  Note:   Prognosis: Fair  Problem List: Decreased strength, Decreased endurance, Decreased range of motion, Impaired balance, Decreased mobility, Decreased safety awareness, Pain, Decreased skin integrity  Assessment: Pt is a 61 y o  female seen for PT evaluation s/p admission to [address] [place] [address] on 12/28/2019 with Cellulitis  She had change in mental status with confusion and slurred speech upon admission  Order placed for PT services  Upon evaluation: Pt is presenting with impaired functional mobility due to pain, decreased strength, decreased ROM, decreased endurance, impaired balance, decreased safety awareness, impaired judgment, fall risk, LE edema and impaired skin integrity requiring minimal to dependent assistance for bed mobility, mechanical conveyance from nursing standpoint for OOB mobility and unable to clear buttock for sit<>stand transfer despite maximal Ax2   Pt's clinical presentation is currently unstable/unpredictable given the functional mobility deficits above, especially weakness, decreased ROM, edema of extremities, decreased skin integrity, decreased endurance, pain, decreased activity tolerance, decreased functional mobility tolerance, decreased safety awareness and impaired judgement, coupled with fall risks as indicated by AM-PAC 6-Clicks: 1/97 as well as hx of falls, impaired balance, polypharmacy, impaired judgement and decreased safety awareness and combined with medical complications of abnormal H&H, abnormal CO2 values, fall during admission and need for input for mobility technique/safety  Pt's PMHx and comorbidities that may affect physical performance and progress include: obesity and renal disorder, lymphedema, venous stasis LEs  Personal factors affecting pt at time of IE include: limited home support, inability to perform IADLs, inability to perform ADLs, inability to navigate level surfaces without external assistance, decreased initiation and engagement and recent fall(s)/fall history  Pt will benefit from continued skilled PT services to address deficits as defined above and to maximize level of functional mobility to facilitate return toward PLOF and improved QOL  From PT/mobility standpoint, recommendation at time of d/c would be Short term rehab pending progress in order to reduce fall risk and maximize pt's functional independence and consistency with mobility in order to facilitate return to PLOF  Recommend ther ex next 1-2 sessions and mechanical conveyance from nursing standpoint for OOB mobility  Recommendation: Short-term skilled PT     PT - OK to Discharge: Yes(when medically cleared)    See flowsheet documentation for full assessment

## 2019-12-31 NOTE — ASSESSMENT & PLAN NOTE
· Continue IV zosyn and IV vancomycin for now  · IV vancomycin dosing per pharmacy with an elevated vancomycin trough level  · Check a MRSA nasal screen  · Follow culture results  · PT/OT evaluations

## 2019-12-31 NOTE — PHYSICAL THERAPY NOTE
PHYSICAL THERAPY EVALUATION  NAME:  Constanza Pearce  DATE: 12/31/19    AGE:   61 y o   Mrn:   [id number]  ADMIT DX:  Weakness [R53 1]  Cellulitis of right leg [V45 911]    Past Medical History:   Diagnosis Date    Disease of thyroid gland     Lymphedema     Morbid obesity due to excess calories ([facility] [address] )     Renal disorder      Length Of Stay: 3  Performed at least 2 patient identifiers during session: Name and Birthday  PHYSICAL THERAPY EVALUATION :        12/31/19 1100   Note Type   Note type Eval only   Pain Assessment   Pain Assessment 0-10   Pain Location   (generalized pain all over)   Home Living   Type of [address] [place] [address] One level; Able to live on main level with bedroom/bathroom  (no CORDELIA)   Bathroom Shower/Tub Tub/shower unit  (bathes at sink)   Bathroom Toilet Standard   Additional Comments Pr reports living in a 1 story home with 0 CORDELIA  sleeps in a standard twin bed  baths sinkside  [facility]   Prior Function   Level of [place] Modified independent with wheelchair   Lives With Spouse;Son  (2 sons and daughter in law)   Receives Help From Family   ADL Assistance Independent   IADLs Needs assistance  (Pt reports completing some cooking)   Falls in the last 6 months 1 to 4   Comments Pt reports completing ADLs and functional mobility @ Mod I with cane   Restrictions/Precautions   Other Precautions Pain; Fall Risk   Cognition   Orientation Level Oriented to person;Oriented to place;Oriented to situation;Disoriented to time  (unable to recall year)   RLE Assessment   RLE Assessment   (AROM limited by body habitus  WFL, knee ext 0 degrees)   RLE Overall AROM   R Ankle Dorsiflexion ankle DF to neutral  minimal ROM of ankle DF   LLE Assessment   LLE Assessment   (AROM limited by body habitus  WFL, knee ext 0 degrees)   Coordination   Sensation   (hypersensitive to palpation)   Bed Mobility   Supine to Sit 4  Minimal assistance   Additional items Assist x 1; Increased time required;LE management;Verbal cues  (VCs for tehcnique wiht UE use, LE adva to EOB)   Sit to Supine 1  Dependent   Additional items Assist x 3   Additional Comments HOB flat without bedrail  completed bed mobility supine to sit with minAx1 with min cues for technique and sequence  Required dependent assistance of 3 to return to supine  Transfers   Sit to Stand   (pt unable to clear buttock off bed x2 trials)   Additional items Assist x 2   Additional Comments Pt unable to clear buttock off bed despite maxAx2 and trials x2  Pt with decreased LE activation to facilitate transfer trial despite maximal assistance of 2 peopole and max cues for technique and participation  Recommend full mechanical lift for OOB transfers  Balance   Static Sitting Fair   Dynamic Sitting Fair -   Activity Tolerance   Activity Tolerance Patient limited by pain   Medical Staff Made Aware OTMarcellus Parent   Nurse Made Aware Supervisor, KANDACE Rodrigues   Assessment   Prognosis Fair   Problem List Decreased strength;Decreased endurance;Decreased range of motion; Impaired balance;Decreased mobility; Decreased safety awareness;Pain;Decreased skin integrity   Goals   STG Expiration Date 01/10/20   PT Treatment Day 0   Plan   Treatment/Interventions Functional transfer training;LE strengthening/ROM; Elevations; Therapeutic exercise; Endurance training;Patient/family training;Equipment eval/education; Bed mobility;Gait training;Spoke to nursing;Spoke to case management;OT   PT Frequency Other (Comment)  (3-5x/week)   Recommendation   Recommendation Short-term skilled PT   Equipment Recommended Walker; Wheelchair   PT - OK to Discharge Yes  (when medically cleared)   Additional Comments [facility] 6 clicks 4/90     (Please find full objective findings from PT assessment regarding body systems outlined above)  Assessment: Pt is a 61 y o  female seen for PT evaluation s/p admission to [address] [place] [address] on 12/28/2019 with Cellulitis   She had change in mental status with confusion and slurred speech upon admission  Order placed for PT services  Upon evaluation: Pt is presenting with impaired functional mobility due to pain, decreased strength, decreased ROM, decreased endurance, impaired balance, decreased safety awareness, impaired judgment, fall risk, LE edema and impaired skin integrity requiring minimal to dependent assistance for bed mobility, mechanical conveyance from nursing standpoint for OOB mobility and unable to clear buttock for sit<>stand transfer despite maximal Ax2  Pt's clinical presentation is currently unstable/unpredictable given the functional mobility deficits above, especially weakness, decreased ROM, edema of extremities, decreased skin integrity, decreased endurance, pain, decreased activity tolerance, decreased functional mobility tolerance, decreased safety awareness and impaired judgement, coupled with fall risks as indicated by AM-PAC 6-Clicks: 3/43 as well as hx of falls, impaired balance, polypharmacy, impaired judgement and decreased safety awareness and combined with medical complications of abnormal H&H, abnormal CO2 values, fall during admission and need for input for mobility technique/safety  Pt's PMHx and comorbidities that may affect physical performance and progress include: obesity and renal disorder, lymphedema, venous stasis LEs  Personal factors affecting pt at time of IE include: limited home support, inability to perform IADLs, inability to perform ADLs, inability to navigate level surfaces without external assistance, decreased initiation and engagement and recent fall(s)/fall history  Pt will benefit from continued skilled PT services to address deficits as defined above and to maximize level of functional mobility to facilitate return toward PLOF and improved QOL   From PT/mobility standpoint, recommendation at time of d/c would be Short term rehab pending progress in order to reduce fall risk and maximize pt's functional independence and consistency with mobility in order to facilitate return to PLOF  Recommend ther ex next 1-2 sessions and mechanical conveyance from nursing standpoint for OOB mobility  Pt educated on safety to have [facility] elevated >/= 30 degrees when consuming food/beverages as she was attempting to drink water with HOB at 10 degrees  HOB elevated to 32 degrees and patient with bed remote in reach  Goals: Pt will: Perform bed mobility tasks supine to sit EOB with Supervision, rolling with modAx1 and sit to supine with modAx2 to reposition in bed and prepare for transfers  Pt will perform transfers with maxAx2 to increase Indep in home environment, decrease burden of care, decrease risk for falls, improve ease of transfers and improve activity tolerance and prepare for ambulation  PT to trial ambulation when appropriate        Carmelo Carson, PT,DPT

## 2019-12-31 NOTE — ASSESSMENT & PLAN NOTE
· Secondary to fall during the hospitalization  · If the left foot pain does not improve in the next 24 hours, she will need imaging of the left foot

## 2019-12-31 NOTE — SOCIAL WORK
STR is recommended by PT  CM discussed options with pt, CM offered a list of STR facilities within a 20 mile radius of pts home  Pt sts she will look at list and discuss with her family but she is undecided as to whether oar not she is interested in [address], as she wishes "to just go home with home health nurses "  Cm to follow     1520 CM revisited STR choices, CM ask pt if she had time to look over list and talk to her family, pt mateo, sts "I took a nap"    Pt sts "I will look at it later "  Pt notified that Cm will not be in tomorrow, as it is the holiday, but there is a CM on call if she has any questions/choices, pt verbalizes understanding

## 2019-12-31 NOTE — ASSESSMENT & PLAN NOTE
· Continue her home dose of PO levothyroxine  Outpatient follow-up with PCP in regards to this matter

## 2019-12-31 NOTE — PLAN OF CARE
Problem: OCCUPATIONAL THERAPY ADULT  Goal: Performs self-care activities at highest level of function for planned discharge setting  See evaluation for individualized goals  Description  Treatment Interventions: ADL retraining, Functional transfer training, UE strengthening/ROM, Endurance training, Patient/family training, Equipment evaluation/education, Compensatory technique education, Continued evaluation, Energy conservation, Activityengagement          See flowsheet documentation for full assessment, interventions and recommendations  Note:   Limitation: Decreased ADL status, Decreased UE strength, Decreased Safe judgement during ADL, Decreased endurance, Decreased high-level ADLs  Prognosis: Fair  Assessment: Pt is a 62 y/o F admitted to [address] [place] [address] 12/28/19 d/t experiencing confusion and drainage from LLE  Dx: Cellulitis  Pt with significant PMHx impacting performance during functional tasks including: AMS, obesity (BMI 75 57), lymphedema, venous statis of LE  Pt reports living in a 1 story home with 0 CORDELIA  Pt ambulating with a cane and completing ADL tasks @ Mod I  Pt lives with her  and 2 sons and daughter in law  Pt completes some of the cooking although has assistance with most IADL tasks  Pt reports she baths at sinkside  Pt reporting PLOF although uncertain is this is Pt's most recent performance at home or is Pt has had a recent decline  On evaluation, Pt completing supine  sit EOB @ Min A with extended time and use of bedrails  Pt sitting EOB while maintaining F+/F balance  Attempted STS from EOB >RW with A x's 2 although unable to get buttocks off of bed  Pt requiring Total A x's 3 for sit>supine  Pt requiring Total A for LB dressing and Min-Mod A for UB dressing  Pt scoring 25/100 on Barthel Index   Pt presents with decrease activity tolerance, decrease standing balance, decrease sitting balance, decrease performance during ADL tasks, decrease safety awareness , decrease UB MS, increased pain, decrease generalized strength, decrease activity engagement, decrease performance during functional transfers and decrease sensation  Pt would benefit from continued acute OT services to address deficits as well as post acute rehab upon d/c from [address] [place] [address] [place]    Recommendation: (post acute rehab)  OT Discharge Recommendation: (post acute rehab)

## 2019-12-31 NOTE — PROGRESS NOTES
Progress Note - Anton Carson 1960, 61 y o  female MRN: [id number]    Unit/Bed#: -01 Encounter: [id number]    Primary Care Provider: Lj Guerra DO   Date and time admitted to hospital: 12/28/2019 12:14 PM        * Cellulitis of right lower extremity  Assessment & Plan  · Continue IV zosyn and IV vancomycin for now  · IV vancomycin dosing per pharmacy with an elevated vancomycin trough level  · Check a MRSA nasal screen  · Follow culture results  · PT/OT evaluations    Acute metabolic encephalopathy  Assessment & Plan  · Present on admission  · Likely secondary to acute infection  · The patient's neurologic status has improved    Acquired hypothyroidism  Assessment & Plan  · Continue her home dose of PO levothyroxine  Outpatient follow-up with PCP in regards to this matter      Left foot pain  Assessment & Plan  · Secondary to fall during the hospitalization  · If the left foot pain does not improve in the next 24 hours, she will need imaging of the left foot    Elevated troponin level  Assessment & Plan  · Likely secondary to acute infection  · Check a transthoracic echocardiogram  · Outpatient Cardiology evaluation    Skin breakdown  Assessment & Plan  · The patient was evaluated by the Wound Care nurse with the following impressions/recommendations:  Wound Care     Bilateral lower legs: wash legs with soap and water and a towel avoiding wounds to remove dead skin build up  Irrigate wounds with normal saline and pat dry  Apply thick layer of Silvasorb gel directly to wound base or apply to gauze then place directly on wound  Cover with 4x4 gauze and an ABD then secure with Kerlix and paper tape  Change once daily or as needed for strike through drainage       Right medial thigh: Apply skin prep to periwound and allow to dry  Loosely tuck 1/4" Iodoform packing strip into wound and use a small piece of tape to secure tail to skin   Cover with Allevyn non bordered foam and change daily or as needed for strike through drainage       Right posterior knee: Wash with soap and water then pat dry  Apply Maxorb Ag Rope to to inner aspect of knee, Change daily or as needed for strikethrough drainage       Right Lower Abdomen: Irrigate with normal saline then pat dry  Line underside of pannus with Maxorb Ag Rope and change daily       Wash skin folds with soap and water then pat dry  Dust skin folds underneath breasts, bilateral groin and torso with Nystatin powder three times daily       Apply Lac-Hydrin (ammonium lactate) lotion to bilateral legs avoiding wound base one to two times a day      Skin care Plan:  1-Protect sacrum w/Allevyn foam, luis enrique with P, change q3d and PRN, check skin q-shift  2-Turn/reposition q2h or when medically stable for pressure re-distribution on skin   3-Elevate heels to offload pressure]  4-Moisturize skin daily with skin nourishing cream  5-Ehob cushion in chair when out of bed  6-Hydraguard to bilateral heels BID and PRN        Morbid obesity with BMI of 70 and over, adult [facility])  Assessment & Plan  · Lifestyle modifications are recommended including weight loss, improving her diet, and increasing her amount of activity  · Outpatient Bariatric Surgery evaluation    Transaminitis  Assessment & Plan  · Check an acute hepatitis panel  · Avoid all hepatotoxic agents  · Follow the LFT's (liver function tests)    Lymphedema  Assessment & Plan  · Chronic lymphedema of the bilateral lower extremities  · Outpatient lymphedema therapy        VTE Pharmacologic Prophylaxis:   Pharmacologic: Enoxaparin (Lovenox)  Mechanical VTE Prophylaxis in Place: No with cellulitis of the right lower extremity and with venous stasis dermatitis of the bilateral lower extremities    Patient Centered Rounds: I have performed bedside rounds with nursing staff today  Time Spent for Care: 30 minutes  More than 50% of total time spent on counseling and coordination of care as described above      I updated the patient's family at the bedside  Current Length of Stay: 3 day(s)    Current Patient Status: Inpatient   Certification Statement: The patient will continue to require additional inpatient hospital stay due to the need for IV antibiotic treatment  Code Status: Level 1 - Full Code      Subjective: The patient was seen and examined  The confusion has improved  The patient complains of left foot pain  Objective:     Vitals:   Temp (24hrs), Av 3 °F (36 8 °C), Min:97 9 °F (36 6 °C), Max:98 6 °F (37 °C)    Temp:  [97 9 °F (36 6 °C)-98 6 °F (37 °C)] 98 3 °F (36 8 °C)  HR:  [72-81] 81  Resp:  [13-16] 16  BP: (124-130)/(57-63) 124/57  SpO2:  [96 %-99 %] 98 %  Body mass index is 75 57 kg/m²  Input and Output Summary (last 24 hours):        Intake/Output Summary (Last 24 hours) at 2019 1835  Last data filed at 2019 1145  Gross per 24 hour   Intake 410 ml   Output    Net 410 ml       Physical Exam:     Physical Exam  General:  NAD, awake, alert, follows commands  HEENT:  NC/AT, mucous membranes moist  Neck:  Supple, No JVP elevation  CV:  + S1, + S2, RRR  Pulm:  Lung fields are CTA bilaterally  Abd:  Soft, Non-tender, Non-distended  Ext:  No clubbing/cyanosis, + Edema of the bilateral lower extremities  Skin:  + Erythema of the right anterior shin, + Venous stasis dermatitis of the bilateral anterior shins      Additional Data:    Labs:    Results from last 7 days   Lab Units 19  0626   WBC Thousand/uL 6 41   HEMOGLOBIN g/dL 10 2*   HEMATOCRIT % 37 6   PLATELETS Thousands/uL 313   NEUTROS PCT % 57   LYMPHS PCT % 15   MONOS PCT % 13*   EOS PCT % 14*     Results from last 7 days   Lab Units 19  0721 19  0626   SODIUM mmol/L 140 140   POTASSIUM mmol/L 3 8 3 7   CHLORIDE mmol/L 102 103   CO2 mmol/L 35* 36*   BUN mg/dL 12 13   CREATININE mg/dL 1 04 1 25   ANION GAP mmol/L 3* 1*   CALCIUM mg/dL 7 6* 7 4*   ALBUMIN g/dL  --  2 0*   TOTAL BILIRUBIN mg/dL  --  0 74   ALK PHOS U/L --  82   ALT U/L  --  97*   AST U/L  --  110*   GLUCOSE RANDOM mg/dL 84 91     Results from last 7 days   Lab Units 12/28/19  1250   INR  1 31*             Results from last 7 days   Lab Units 12/28/19  1250   LACTIC ACID mmol/L 1 8           * I Have Reviewed All Lab Data Listed Above  * Additional Pertinent Lab Tests Reviewed: Patricio Buckley Admission Reviewed      Recent Cultures (last 7 days):     Results from last 7 days   Lab Units 12/28/19 2039 12/28/19  1250   BLOOD CULTURE   --  No Growth at 48 hrs  No Growth at 48 hrs  URINE CULTURE  30,000-39,000 cfu/ml   --        Last 24 Hours Medication List:     Current Facility-Administered Medications:  cholecalciferol 1,000 Units Oral Daily Justice Stevens DO    enoxaparin 60 mg Subcutaneous Q12H [facility] Justice Stevens DO    [START ON 1/1/2020] FLUoxetine 20 mg Oral Daily Justice Stevens DO    levothyroxine 125 mcg Oral Early Morning Justice Stevens DO    nystatin  Topical BID Flor Lorenzo MD    ondansetron 4 mg Intravenous Q8H PRN RICK Barnes    piperacillin-tazobactam 3 375 g Intravenous Q6H Flor Lorenzo MD Last Rate: 3 375 g (12/31/19 1302)   vancomycin 1,500 mg Intravenous Q12H Flor Lorenzo MD         Today, Patient Was Seen By: Justice Stevens DO    ** Please Note: Dictation voice to text software may have been used in the creation of this document   **

## 2019-12-31 NOTE — OCCUPATIONAL THERAPY NOTE
[address] Evaluation     Patient Name: Justice SANTIAGOZUEDKavonN Date: 12/31/2019  Problem List  Principal Problem:    Cellulitis  Active Problems:    Lymphedema    Altered mental status    Venous stasis of lower extremity    Transaminitis    Morbid obesity ([address] )    Past Medical History  Past Medical History:   Diagnosis Date    Disease of thyroid gland     Lymphedema     Morbid obesity due to excess calories ([address] )     Renal disorder      Past Surgical History  History reviewed  No pertinent surgical history  12/31/19 1046   Pain Assessment   Pain Location   (Pt reporting generalized pain "all over")   Home Living   Type of [address] [place] [address] One level; Able to live on main level with bedroom/bathroom  (0 CORDELIA)   Bathroom Shower/Tub Tub/shower unit  (Pt reports she baths sinkside)   Bathroom Toilet Standard   Additional Comments Pr reports living in a 1 story home with 0 CORDELIA  sleeps in a standard twin bed  baths sinkside  utilizes a cane to [place]   Prior Function   Level of [place] Modified independent with wheelchair   Lives With Spouse;Son;Daughter  (2 sons and daughter in law)   Receives Help From Family   ADL Assistance Independent   IADLs Needs assistance  (Pt reports completing some cooking)   Falls in the last 6 months 1 to 4   Comments Pt reports completing ADLs and functional mobility @ Mod I with cane   Lifestyle   Autonomy Pt reports being Mod I for ADLs and functional ambulation   Reciprocal Relationships Pt lives at home with her  and 2 sons and DIL   Service to Others Pt is currently not working   Intrinsic Gratification Pt enjoys watching horros shows/movies   ADL   Where Assessed Edge of bed   UB Dressing Assistance 3  Moderate Assistance   LB Dressing Assistance 1  Total Assistance   Bed Mobility   Supine to Sit 4  Minimal assistance   Additional items Assist x 1; Increased time required;Verbal cues;LE management   Sit to Supine 1  Dependent   Additional items Assist x 3   Additional Comments HOB flat without bedrail  completed bed mobility supine to sit with minAx1 with min cues for technique and sequence  Required dependent assistance of 3 to return to supine  Transfers   Sit to Stand   (unable to clear buttocks from EOB with use of RW x's2 attemp)   Additional items Assist x 2   Additional Comments Pt unable to clear buttock off bed despite maxAx2 and trials x2  Pt with decreased LE activation to facilitate transfer trial despite maximal assistance of 2 peopole and max cues for technique and participation  Recommend full mechanical lift for OOB transfers  Balance   Static Sitting Fair   Dynamic Sitting Fair -   Activity Tolerance   Activity Tolerance Patient limited by pain; Patient limited by fatigue   Medical Staff Made Aware Spoke with PT, Derek Buckner  Nurse Made Aware Spoke with RN, Aleida Cui Assessment   RUE Assessment WFL   LUE Assessment   LUE Assessment WFL   Hand Function   Gross Motor Coordination Functional   Fine Motor Coordination Functional   Cognition   Overall Cognitive Status WFL   Arousal/Participation Alert; Responsive; Cooperative   Attention Within functional limits   Orientation Level Oriented to person;Oriented to place;Oriented to situation;Disoriented to time   Following Commands Follows one step commands with increased time or repetition   Assessment   Limitation Decreased ADL status; Decreased UE strength;Decreased Safe judgement during ADL;Decreased endurance;Decreased high-level ADLs   Prognosis Fair   Assessment Pt is a 60 y/o F admitted to [address] [place] [address] 12/28/19 d/t experiencing confusion and drainage from LLE  Dx: Cellulitis  Pt with significant PMHx impacting performance during functional tasks including: AMS, obesity (BMI 75 57), lymphedema, venous statis of LE  Pt reports living in a 1 story home with 0 CORDELIA  Pt ambulating with a cane and completing ADL tasks @ Mod I  Pt lives with her  and 2 sons and daughter in law   Pt completes some of the cooking although has assistance with most IADL tasks  Pt reports she baths at sinkside  Pt reporting PLOF although uncertain is this is Pt's most recent performance at home or is Pt has had a recent decline  On evaluation, Pt completing supine  sit EOB @ Min A with extended time and use of bedrails  Pt sitting EOB while maintaining F+/F balance  Attempted STS from EOB >RW with A x's 2 although unable to get buttocks off of bed  Pt requiring Total A x's 3 for sit>supine  Pt requiring Total A for LB dressing and Min-Mod A for UB dressing  Pt scoring 25/100 on Barthel Index  Pt presents with decrease activity tolerance, decrease standing balance, decrease sitting balance, decrease performance during ADL tasks, decrease safety awareness , decrease UB MS, increased pain, decrease generalized strength, decrease activity engagement, decrease performance during functional transfers and decrease sensation  Pt would benefit from continued acute OT services to address deficits as well as post acute rehab upon d/c from [address] [place] [address]  Plan   Treatment Interventions ADL retraining;Functional transfer training;UE strengthening/ROM; Endurance training;Patient/family training;Equipment evaluation/education; Compensatory technique education;Continued evaluation; Energy conservation; Activityengagement   Goal Expiration Date 01/10/20   OT Frequency 3-5x/wk   Recommendation   Recommendation   (post acute rehab)   OT Discharge Recommendation   (post acute rehab)   Barthel Index   Feeding 10   Bathing 0   Grooming Score 5   Dressing Score 0   Bladder Score 5   Bowels Score 5   Toilet Use Score 0   Transfers (Bed/Chair) Score 0   Mobility (Level Surface) Score 0   Stairs Score 0   Barthel Index Score 25       Pt goals to be met by 1/10/20    1  Pt will demonstrate ability to complete supine<>sit @ Mod I in order to increase safety and independence during ADL tasks    2  Pt will demonstrate ability to complete UB ADLs including washing/dressing @ Mod I in order to increase performance and participation during meaningful tasks  3  Pt will demonstrate ability to complete LB dressing @ Mod A in order to increase safety and independence during meaningful tasks  4  Pt will demonstrate ability to complete toileting tasks including CM and pericare @ Mod A in order to increase safety and independence during meaningful tasks  5  Pt will demonstrate ability to complete EOB, chair, toilet/commode transfers @ Mod A in order to increase performance and participation during functional tasks  6  Pt will demonstrate ability to stand for 5 minutes while maintaining G balance with use of RW for UB support PRN  7  Pt will demonstrate ability to tolerate 30-35 minute OT session with no vc'ing for deep breathing or use of energy conservation techniques in order to increase activity tolerance during functional tasks  8  Pt will demonstrate Good carryover of use of energy conservation/compensatory strategies during ADLs and functional tasks in order to increase safety and reduce risk for falls  9  Pt will demonstrate Good attention and participation in continued evaluation of functional ambulation house hold distances in order to assist with safe d/c planning  10  Pt will attend to continued cognitive assessments 100% of the time in order to provide most appropriate d/c recommendations  11  Pt will demonstrate 100% carryover of BUE HEP in order to increase BUE MS and increase performance during functional tasks upon d/c home      Francisco Vigil OTR/L

## 2019-12-31 NOTE — PLAN OF CARE
Problem: Potential for Falls  Goal: Patient will remain free of falls  Description  INTERVENTIONS:  - Assess patient frequently for physical needs  -  Identify cognitive and physical deficits and behaviors that affect risk of falls    -  [place] fall precautions as indicated by assessment   - Educate patient/family on patient safety including physical limitations  - Instruct patient to call for assistance with activity based on assessment  - Modify environment to reduce risk of injury  - Consider OT/PT consult to assist with strengthening/mobility  Outcome: Progressing     Problem: Prexisting or High Potential for Compromised Skin Integrity  Goal: Skin integrity is maintained or improved  Description  INTERVENTIONS:  - Identify patients at risk for skin breakdown  - Assess and monitor skin integrity  - Assess and monitor nutrition and hydration status  - Monitor labs   - Assess for incontinence   - Turn and reposition patient  - Assist with mobility/ambulation  - Relieve pressure over bony prominences  - Avoid friction and shearing  - Provide appropriate hygiene as needed including keeping skin clean and dry  - Evaluate need for skin moisturizer/barrier cream  - Collaborate with interdisciplinary team   - Patient/family teaching  - Consider wound care consult   Outcome: Progressing     Problem: PAIN - ADULT  Goal: Verbalizes/displays adequate comfort level or baseline comfort level  Description  Interventions:  - Encourage patient to monitor pain and request assistance  - Assess pain using appropriate pain scale  - Administer analgesics based on type and severity of pain and evaluate response  - Implement non-pharmacological measures as appropriate and evaluate response  - Consider cultural and social influences on pain and pain management  - Notify physician/advanced practitioner if interventions unsuccessful or patient reports new pain  Outcome: Progressing     Problem: INFECTION - ADULT  Goal: Absence or prevention of progression during hospitalization  Description  INTERVENTIONS:  - Assess and monitor for signs and symptoms of infection  - Monitor lab/diagnostic results  - Administer medications as ordered  - Instruct and encourage patient and family to use good hand hygiene technique  - Identify and instruct in appropriate isolation precautions for identified infection/condition   Outcome: Progressing     Problem: SAFETY ADULT  Goal: Maintain or return to baseline ADL function  Description  INTERVENTIONS:  -  Assess patient's ability to carry out ADLs; assess patient's baseline for ADL function and identify physical deficits which impact ability to perform ADLs (bathing, care of mouth/teeth, toileting, grooming, dressing, etc )  - Assess/evaluate cause of self-care deficits   - Assess range of motion  - Assess patient's mobility; develop plan if impaired  - Assess patient's need for assistive devices and provide as appropriate  - Encourage maximum independence but intervene and supervise when necessary  - Involve family in performance of ADLs  - Assess for home care needs following discharge   - Consider OT consult to assist with ADL evaluation and planning for discharge  - Provide patient education as appropriate  Outcome: Progressing  Goal: Maintain or return mobility status to optimal level  Description  INTERVENTIONS:  - Assess patient's baseline mobility status (ambulation, transfers, stairs, etc )    - Identify cognitive and physical deficits and behaviors that affect mobility  - Identify mobility aids required to assist with transfers and/or ambulation (gait belt, sit-to-stand, lift, walker, cane, etc )  - [place] fall precautions as indicated by assessment  - Record patient progress and toleration of activity level on Mobility SBAR; progress patient to next Phase/Stage  - Instruct patient to call for assistance with activity based on assessment  - Consider rehabilitation consult to assist with strengthening/weightbearing, etc   Outcome: Progressing     Problem: DISCHARGE PLANNING  Goal: Discharge to home or other facility with appropriate resources  Description  INTERVENTIONS:  - Identify barriers to discharge w/patient and caregiver  - Arrange for needed discharge resources and transportation as appropriate  - Identify discharge learning needs (meds, wound care, etc )  - Arrange for interpretive services to assist at discharge as needed  - Refer to Case Management Department for coordinating discharge planning if the patient needs post-hospital services based on physician/advanced practitioner order or complex needs related to functional status, cognitive ability, or social support system  Outcome: Progressing     Problem: Knowledge Deficit  Goal: Patient/family/caregiver demonstrates understanding of disease process, treatment plan, medications, and discharge instructions  Description  Complete learning assessment and assess knowledge base    Interventions:  - Provide teaching at level of understanding  - Provide teaching via preferred learning methods  Outcome: Progressing

## 2019-12-31 NOTE — PROGRESS NOTES
Vancomycin Assessment    Reid Huddleston is a 61 y o  female who is currently receiving vancomycin 2000mg IV  Q12hrs for skin-soft tissue infection     Relevant clinical data and objective history reviewed:  Creatinine   Date Value Ref Range Status   12/31/2019 1 04 0 60 - 1 30 mg/dL Final     Comment:     Standardized to IDMS reference method   12/30/2019 1 25 0 60 - 1 30 mg/dL Final     Comment:     Standardized to IDMS reference method   12/29/2019 1 17 0 60 - 1 30 mg/dL Final     Comment:     Standardized to IDMS reference method     /63   Pulse 74   Temp 97 9 °F (36 6 °C)   Resp 13   Ht 5' 3" (1 6 m)   Wt (!) 194 kg (426 lb 9 6 oz)   SpO2 99%   BMI 75 57 kg/m²   I/O last 3 completed shifts: In: 409 [P O :240; I V :119; IV Piggyback:50]  Out: 200 [Urine:200]  Lab Results   Component Value Date/Time    BUN 12 12/31/2019 07:21 AM    WBC 6 41 12/30/2019 06:26 AM    HGB 10 2 (L) 12/30/2019 06:26 AM    HCT 37 6 12/30/2019 06:26 AM    MCV 89 12/30/2019 06:26 AM     12/30/2019 06:26 AM     Temp Readings from Last 3 Encounters:   12/30/19 97 9 °F (36 6 °C)     Vancomycin Days of Therapy: 4    Assessment/Plan  The patient is currently on vancomycin utilizing scheduled dosing  Baseline risks associated with therapy include: pre-existing renal impairment, concomitant nephrotoxic medications and advanced age  The patient is receiving 2000mg IV  Q12hrs with the most recent vancomycin level being at steady-state and supratherapeutic based on a goal of 15-20 (appropriate for most indications) ; therefore, after clinical evaluation will be changed to 1500mg IV Q12hrs; trough due 2100 on 01/01/20   Pharmacy will continue to follow closely for s/sx of nephrotoxicity, infusion reactions and appropriateness of therapy  BMP and CBC will be ordered per protocol  Plan for trough as patient approaches steady state, prior to the other  dose at approximately 2100 on 01/01/20   Pharmacy will continue to follow the patients culture results and clinical progress daily      Delores Mcbride, Pharmacist

## 2019-12-31 NOTE — ASSESSMENT & PLAN NOTE
· Likely secondary to acute infection  · Check a transthoracic echocardiogram  · Outpatient Cardiology evaluation

## 2019-12-31 NOTE — SOCIAL WORK
CM met with the patient at bedside to review the CM role and discuss possible dc needs  At time of interview pt is AAOx4, pt lives with her  and 2 sons in a one story home with  CORDELIA in [place]  Pt was IPTA  Pt has DME of a cane and is ambulatory without assistance   Pt has bathroom/bedroom on first/main floor  Pt denies any history of drug/etoh abuse, mental illness or inpatient psych admissions  Pt denies any history of SNF/Rehab  Pt sts she is current with [facility]  Pt sts she does drive  Pt does not have a POA/LW  Preferred [facility]: [facility]  Contact: Juan Lee  PCP: Dr Gigi Adler    CM reviewed d/c planning process including the following: identifying help at home, patient preference for d/c planning needs, availability of treatment team to discuss questions or concerns patient and/or family may have regarding understanding medications and recognizing signs and symptoms once discharged  CM also encouraged patient to follow up with all recommended appointments after discharge  Patient advised of importance for patient and family to participate in managing patients medical well being

## 2019-12-31 NOTE — ASSESSMENT & PLAN NOTE
· Present on admission  · Likely secondary to acute infection  · The patient's neurologic status has improved

## 2019-12-31 NOTE — PROGRESS NOTES
Progress Note - General Surgery   Shreya Morales 61 y o  female MRN: [id number]  Unit/Bed#: -01 Encounter: [id number]    Assessment:    Bilateral lower extremity cellulitis, slow improvement  Right thigh abscess s/p packing  Multiple venous stasis bilateral lower extremities  Rash under both breasts and abdominal pannus, appears fungal like  Lymphedema  Venous duplex to r/o DVT was negative both lower extremities  Profound morbid obesity, BMI 75 57        Plan:  General surgery will continue to follow, no indication for surgical intervention at this time  Wound care consult was reviewed and recommendations appreciated with extensive wound care instructions for multiple sites as documented  Continue present IV antibiotic regimen including Zosyn and vancomycin  Continue local wound care, multiple sites  Daily packing changes to right thigh with iodoform quarter-inch packing daily  Elevate legs while in bed  Follow AM labs to include CBC and BMP  Nutrition consult was completed  OOB to chair / ambulating ad lizzy  Offloading while the patient remains supine in bed  Consideration for skilled nursing placement after hospital discharge  Dr Zenobia Chahal also examine the patient this morning for any further recommendation        Subjective/Objective   Chief Complaint:  Patient denies any new complaints this morning  Subjective:  27-year-old white female with massive morbid obesity was admitted because of bilateral lower venous ulcerations with cellulitis on December 28  The patient has been treated with pip-tazo and vancomycin  Patient has been mostly confined to her bed  She was seen yesterday by both wound management and nutrition in consultation  She underwent venous duplex of both lower extremities, negative for DVT  She denies any fever    The patient continues with skin sloughing of both lower extremities, more on the right lower leg to the level above the knee and just below band to the knee on the left     The patient has quarter-inch iodoform packing into the wound of the right medial thigh which has been changed daily  The patient has a fungal like rash underneath both breasts and the abdominal pannus  Nursing currently was rotating the patient to her side patient was noted to have a dry small older appearing scabbed abrasion of the right outer buttock, the patient is being rotated with offloading while supine in bed  The patient has been using bedpan for elimination  Scheduled Meds:  Current Facility-Administered Medications:  acetaminophen 650 mg Oral Q6H PRN Vanessa S Valeri, CRNP    enoxaparin 40 mg Subcutaneous Daily Noel Whaley MD    nystatin  Topical BID Noel Whaley MD    ondansetron 4 mg Intravenous Q8H PRN Vanessa S Valeri, CRNP    piperacillin-tazobactam 3 375 g Intravenous Q6H Noel Whaley MD Last Rate: 3 375 g (12/31/19 4983)   vancomycin 17 5 mg/kg (Adjusted) Intravenous Q12H Noel Whaley MD Last Rate: 2,000 mg (12/30/19 2220)     Continuous Infusions:   PRN Meds:   acetaminophen    ondansetron    Objective:     Blood pressure 129/63, pulse 74, temperature 97 9 °F (36 6 °C), resp  rate 13, height 5' 3" (1 6 m), weight (!) 194 kg (426 lb 9 6 oz), SpO2 99 %  ,Body mass index is 75 57 kg/m²  Intake/Output Summary (Last 24 hours) at 12/31/2019 0736  Last data filed at 12/30/2019 2107  Gross per 24 hour   Intake 50 ml   Output    Net 50 ml       Invasive Devices     Peripheral Intravenous Line            Peripheral IV 12/30/19 Right Antecubital less than 1 day                Physical Exam:  Patient is awake and responds questions appropriately  General body habitus is profoundly obese  She is lying supine  Head normocephalic  Oral mucosa is pink and moist   No oral thrush seen  Heart distant density regular rate and rhythm  Lungs essentially clear without rales or rhonchi  Back patient needs assistance to roll to her side    Small old area of scab like appearance in the right lateral buttock  No sacral decubitus seen  Abdomen wide abdominal girth  Large abdominal pannus with erythema and fungal like appearance underneath the fold  The patient has 3+ edema to both lower extremities  Right anterior thigh shows wound with packing in place  Multiple ulcers noted in the right lower leg, left lower extremity shows a posterior ulcer with ulceration and skin sloughing noted  DP and PT pulses are not palpable secondary to edema  The patient has scaling and significant skin sloughing  Erythema on the right lower extremity extends all the way to the right distal thigh, erythema in the left lower extremity extends just below the knee  Both ankles were wrapped with Frida  Ambulation was not observed  Decreased sensation light touch in both lower extremities to the plantar aspect of the feet  Lab, Imaging and other studies:I have personally reviewed pertinent lab results  [facility] REPORT  CLINICAL:  Indications:  Patient presents with chronic bilateral lower extremity edema  Operative History:  Denies cardiovascular intervention  Risk Factors  The patient has history of Obesity  The patient current BMI is 75 45, Weight is  426 lb and height is 63 in  FINDINGS:     Segment  Right            Left                Impression       Impression         CFV      Normal (Patent)  Normal (Patent)             CONCLUSION:  Impression:  RIGHT LOWER LIMB:  No evidence of acute or chronic deep vein thrombosis  No evidence of superficial thrombophlebitis noted  Doppler evaluation shows a normal response to augmentation maneuvers  Popliteal and posterior tibial arterial Doppler waveforms are triphasic  LEFT LOWER LIMB:  No evidence of acute or chronic deep vein thrombosis  No evidence of superficial thrombophlebitis noted  Doppler evaluation shows a normal response to augmentation maneuvers  Popliteal and  posterior tibial arterial Doppler waveforms are biphasic       Technically difficult/limited study to body habitus, pitting edema and poor  visualization of lower extremity veins  Technical findings were given to Dr Rosemary Pretty       SIGNATURE:  Electronically Signed by: Doris Berman on 2019-12-30 03:57:15 PM    CBC: No results found for: WBC, HGB, HCT, MCV, PLT, ADJUSTEDWBC, MCH, MCHC, RDW, MPV, NRBC, CMP: No results found for: SODIUM, K, CL, CO2, ANIONGAP, BUN, CREATININE, GLUCOSE, CALCIUM, AST, ALT, ALKPHOS, PROT, BILITOT, EGFR  VTE Pharmacologic Prophylaxis: Enoxaparin (Lovenox)  VTE Mechanical Prophylaxis: sequential compression device     Gabby Barth PA-C

## 2019-12-31 NOTE — ASSESSMENT & PLAN NOTE
· The patient was evaluated by the Wound Care nurse with the following impressions/recommendations:  Wound Care     Bilateral lower legs: wash legs with soap and water and a towel avoiding wounds to remove dead skin build up  Irrigate wounds with normal saline and pat dry  Apply thick layer of Silvasorb gel directly to wound base or apply to gauze then place directly on wound  Cover with 4x4 gauze and an ABD then secure with Kerlix and paper tape  Change once daily or as needed for strike through drainage       Right medial thigh: Apply skin prep to periwound and allow to dry  Loosely tuck 1/4" Iodoform packing strip into wound and use a small piece of tape to secure tail to skin  Cover with Allevyn non bordered foam and change daily or as needed for strike through drainage       Right posterior knee: Wash with soap and water then pat dry  Apply Maxorb Ag Rope to to inner aspect of knee, Change daily or as needed for strikethrough drainage       Right Lower Abdomen: Irrigate with normal saline then pat dry  Line underside of pannus with Maxorb Ag Rope and change daily       Wash skin folds with soap and water then pat dry  Dust skin folds underneath breasts, bilateral groin and torso with Nystatin powder three times daily       Apply Lac-Hydrin (ammonium lactate) lotion to bilateral legs avoiding wound base one to two times a day      Skin care Plan:  1-Protect sacrum w/Allevyn foam, luis enrique with P, change q3d and PRN, check skin q-shift  2-Turn/reposition q2h or when medically stable for pressure re-distribution on skin   3-Elevate heels to offload pressure]  4-Moisturize skin daily with skin nourishing cream  5-Ehob cushion in chair when out of bed  6-Hydraguard to bilateral heels BID and PRN

## 2019-12-31 NOTE — ASSESSMENT & PLAN NOTE
· Lifestyle modifications are recommended including weight loss, improving her diet, and increasing her amount of activity    · Outpatient Bariatric Surgery evaluation

## 2020-01-01 ENCOUNTER — APPOINTMENT (INPATIENT)
Dept: RADIOLOGY | Facility: HOSPITAL | Age: 60
DRG: 602 | End: 2020-01-01
Payer: MEDICARE

## 2020-01-01 PROBLEM — D64.9 ANEMIA: Status: ACTIVE | Noted: 2020-01-01

## 2020-01-01 LAB
ALBUMIN SERPL BCP-MCNC: 1.9 G/DL (ref 3.5–5)
ALP SERPL-CCNC: 74 U/L (ref 46–116)
ALT SERPL W P-5'-P-CCNC: 51 U/L (ref 12–78)
ANION GAP SERPL CALCULATED.3IONS-SCNC: 3 MMOL/L (ref 4–13)
AST SERPL W P-5'-P-CCNC: 38 U/L (ref 5–45)
BASOPHILS # BLD AUTO: 0.03 THOUSANDS/ΜL (ref 0–0.1)
BASOPHILS NFR BLD AUTO: 0 % (ref 0–1)
BILIRUB SERPL-MCNC: 0.63 MG/DL (ref 0.2–1)
BUN SERPL-MCNC: 11 MG/DL (ref 5–25)
CALCIUM SERPL-MCNC: 7.8 MG/DL (ref 8.3–10.1)
CHLORIDE SERPL-SCNC: 104 MMOL/L (ref 100–108)
CK SERPL-CCNC: 40 U/L (ref 26–192)
CO2 SERPL-SCNC: 34 MMOL/L (ref 21–32)
CREAT SERPL-MCNC: 0.97 MG/DL (ref 0.6–1.3)
EOSINOPHIL # BLD AUTO: 0.76 THOUSAND/ΜL (ref 0–0.61)
EOSINOPHIL NFR BLD AUTO: 11 % (ref 0–6)
ERYTHROCYTE [DISTWIDTH] IN BLOOD BY AUTOMATED COUNT: 15.9 % (ref 11.6–15.1)
GFR SERPL CREATININE-BSD FRML MDRD: 64 ML/MIN/1.73SQ M
GLUCOSE SERPL-MCNC: 81 MG/DL (ref 65–140)
HAV IGM SER QL: NORMAL
HBV CORE IGM SER QL: NORMAL
HBV SURFACE AG SER QL: NORMAL
HCT VFR BLD AUTO: 37.7 % (ref 34.8–46.1)
HCV AB SER QL: NORMAL
HGB BLD-MCNC: 9.9 G/DL (ref 11.5–15.4)
IMM GRANULOCYTES # BLD AUTO: 0.02 THOUSAND/UL (ref 0–0.2)
IMM GRANULOCYTES NFR BLD AUTO: 0 % (ref 0–2)
LACTATE SERPL-SCNC: 0.9 MMOL/L (ref 0.5–2)
LYMPHOCYTES # BLD AUTO: 0.92 THOUSANDS/ΜL (ref 0.6–4.47)
LYMPHOCYTES NFR BLD AUTO: 13 % (ref 14–44)
MAGNESIUM SERPL-MCNC: 2.2 MG/DL (ref 1.6–2.6)
MCH RBC QN AUTO: 23.3 PG (ref 26.8–34.3)
MCHC RBC AUTO-ENTMCNC: 26.3 G/DL (ref 31.4–37.4)
MCV RBC AUTO: 89 FL (ref 82–98)
MONOCYTES # BLD AUTO: 0.8 THOUSAND/ΜL (ref 0.17–1.22)
MONOCYTES NFR BLD AUTO: 11 % (ref 4–12)
NEUTROPHILS # BLD AUTO: 4.56 THOUSANDS/ΜL (ref 1.85–7.62)
NEUTS SEG NFR BLD AUTO: 65 % (ref 43–75)
NRBC BLD AUTO-RTO: 0 /100 WBCS
PHOSPHATE SERPL-MCNC: 3.1 MG/DL (ref 2.7–4.5)
PLATELET # BLD AUTO: 296 THOUSANDS/UL (ref 149–390)
PMV BLD AUTO: 10.2 FL (ref 8.9–12.7)
POTASSIUM SERPL-SCNC: 3.7 MMOL/L (ref 3.5–5.3)
PROCALCITONIN SERPL-MCNC: 0.11 NG/ML
PROT SERPL-MCNC: 6.6 G/DL (ref 6.4–8.2)
RBC # BLD AUTO: 4.24 MILLION/UL (ref 3.81–5.12)
SODIUM SERPL-SCNC: 141 MMOL/L (ref 136–145)
TROPONIN I SERPL-MCNC: <0.02 NG/ML
VANCOMYCIN TROUGH SERPL-MCNC: 21.1 UG/ML (ref 10–20)
WBC # BLD AUTO: 7.09 THOUSAND/UL (ref 4.31–10.16)

## 2020-01-01 PROCEDURE — 83735 ASSAY OF MAGNESIUM: CPT | Performed by: INTERNAL MEDICINE

## 2020-01-01 PROCEDURE — 94660 CPAP INITIATION&MGMT: CPT

## 2020-01-01 PROCEDURE — 94760 N-INVAS EAR/PLS OXIMETRY 1: CPT

## 2020-01-01 PROCEDURE — 99232 SBSQ HOSP IP/OBS MODERATE 35: CPT | Performed by: INTERNAL MEDICINE

## 2020-01-01 PROCEDURE — 97110 THERAPEUTIC EXERCISES: CPT

## 2020-01-01 PROCEDURE — 85025 COMPLETE CBC W/AUTO DIFF WBC: CPT | Performed by: INTERNAL MEDICINE

## 2020-01-01 PROCEDURE — 84100 ASSAY OF PHOSPHORUS: CPT | Performed by: INTERNAL MEDICINE

## 2020-01-01 PROCEDURE — 73620 X-RAY EXAM OF FOOT: CPT

## 2020-01-01 PROCEDURE — 97530 THERAPEUTIC ACTIVITIES: CPT

## 2020-01-01 PROCEDURE — 82550 ASSAY OF CK (CPK): CPT | Performed by: INTERNAL MEDICINE

## 2020-01-01 PROCEDURE — 73590 X-RAY EXAM OF LOWER LEG: CPT

## 2020-01-01 PROCEDURE — 80074 ACUTE HEPATITIS PANEL: CPT | Performed by: INTERNAL MEDICINE

## 2020-01-01 PROCEDURE — 83605 ASSAY OF LACTIC ACID: CPT | Performed by: INTERNAL MEDICINE

## 2020-01-01 PROCEDURE — 84145 PROCALCITONIN (PCT): CPT | Performed by: INTERNAL MEDICINE

## 2020-01-01 PROCEDURE — 80202 ASSAY OF VANCOMYCIN: CPT | Performed by: INTERNAL MEDICINE

## 2020-01-01 PROCEDURE — 80053 COMPREHEN METABOLIC PANEL: CPT | Performed by: INTERNAL MEDICINE

## 2020-01-01 PROCEDURE — 84484 ASSAY OF TROPONIN QUANT: CPT | Performed by: INTERNAL MEDICINE

## 2020-01-01 RX ORDER — ALBUTEROL SULFATE 2.5 MG/3ML
2.5 SOLUTION RESPIRATORY (INHALATION) EVERY 6 HOURS PRN
Status: DISCONTINUED | OUTPATIENT
Start: 2020-01-01 | End: 2020-01-02

## 2020-01-01 RX ADMIN — Medication 3.38 G: at 06:01

## 2020-01-01 RX ADMIN — VANCOMYCIN HYDROCHLORIDE 1500 MG: 5 INJECTION, POWDER, LYOPHILIZED, FOR SOLUTION INTRAVENOUS at 23:00

## 2020-01-01 RX ADMIN — NYSTATIN 1 APPLICATION: 100000 POWDER TOPICAL at 19:19

## 2020-01-01 RX ADMIN — NYSTATIN: 100000 POWDER TOPICAL at 09:08

## 2020-01-01 RX ADMIN — LEVOTHYROXINE SODIUM 125 MCG: 125 TABLET ORAL at 06:01

## 2020-01-01 RX ADMIN — MELATONIN 1000 UNITS: at 09:06

## 2020-01-01 RX ADMIN — ENOXAPARIN SODIUM 60 MG: 60 INJECTION SUBCUTANEOUS at 09:06

## 2020-01-01 RX ADMIN — Medication 3.38 G: at 14:33

## 2020-01-01 RX ADMIN — Medication 3.38 G: at 19:19

## 2020-01-01 RX ADMIN — FLUOXETINE 20 MG: 20 CAPSULE ORAL at 09:06

## 2020-01-01 RX ADMIN — Medication 3.38 G: at 01:40

## 2020-01-01 RX ADMIN — ENOXAPARIN SODIUM 60 MG: 60 INJECTION SUBCUTANEOUS at 23:00

## 2020-01-01 RX ADMIN — VANCOMYCIN HYDROCHLORIDE 1500 MG: 5 INJECTION, POWDER, LYOPHILIZED, FOR SOLUTION INTRAVENOUS at 10:01

## 2020-01-01 NOTE — ASSESSMENT & PLAN NOTE
· Continue IV zosyn and IV vancomycin for now  · IV vancomycin dosing per pharmacy with an elevated vancomycin trough level  · Check a MRSA nasal screen  · Follow culture results  · PT/OT

## 2020-01-01 NOTE — RESPIRATORY THERAPY NOTE
Paged to patient's room by RIO  Nursing reports patient's SpO2 drops "into the 40s" while asleep  Discussed hours of sleep bipap with patient and agreed it was a reasonable solution  Discussed situation with patient  Demonstrated mask and how it would fit,  Explained the benefits of positive pressure ventilation and that wearing cpap for a few hours would be beneficial   Patient agreed to try it  Patient placed on auto CPAP with a range of 6 -18 cm H2O  Patient wore mask for less than 5 minutes before tugging at it, reporting to therapist that she has" a fear of suffication and can't have anything on my face "  Patient was returned to nasal cannula    Reported results to RIO

## 2020-01-01 NOTE — ASSESSMENT & PLAN NOTE
· Lifestyle modifications are recommended including weight loss, improving her diet, and increasing her amount of activity    · Check an overnight sleep screen  · She will need an outpatient sleep study completed as soon as possible to check for obstructive sleep apnea  · Outpatient Bariatric Surgery evaluation

## 2020-01-01 NOTE — QUICK NOTE
Was notified by nursing that patient is desaturating to 70s while sleeping  Is on 4 L nasal cannula  When woken, patient's saturation returns to mid 90s  Reviewed old records, no history of sleep apnea or sleep study  Will trial CPAP  After about 4 minutes on CPAP, patient adamantly refused to continue  Will try albuterol p r n

## 2020-01-01 NOTE — PROGRESS NOTES
Progress Note - Marine Even 1960, 61 y o  female MRN: [id number]    Unit/Bed#: -01 Encounter: [id number]    Primary Care Provider: Rigoberto Craven DO   Date and time admitted to hospital: 12/28/2019 12:14 PM        * Cellulitis of right lower extremity  Assessment & Plan  · Continue IV zosyn and IV vancomycin for now  · IV vancomycin dosing per pharmacy with an elevated vancomycin trough level  · Check a MRSA nasal screen  · Follow culture results  · PT/OT    Acute metabolic encephalopathy  Assessment & Plan  · Present on admission  · Likely secondary to acute infection  · The patient's neurologic status has improved    Anemia  Assessment & Plan  · Check an iron panel, vitamin B12 level, and folate level  · Follow the CBC  · Transfuse for a hemoglobin less than 7 g/dl    Acquired hypothyroidism  Assessment & Plan  · Continue her home dose of PO levothyroxine  Outpatient follow-up with PCP in regards to this matter      Left foot pain  Assessment & Plan  · Secondary to fall during the hospitalization  · Check an x-ray of the left foot and left tibia/fibula    Elevated troponin level  Assessment & Plan  · Likely secondary to acute infection  · Check a transthoracic echocardiogram  · Outpatient Cardiology evaluation    Skin breakdown  Assessment & Plan  · The patient was evaluated by the Wound Care nurse with the following impressions/recommendations:  Wound Care     Bilateral lower legs: wash legs with soap and water and a towel avoiding wounds to remove dead skin build up  Irrigate wounds with normal saline and pat dry  Apply thick layer of Silvasorb gel directly to wound base or apply to gauze then place directly on wound  Cover with 4x4 gauze and an ABD then secure with Kerlix and paper tape  Change once daily or as needed for strike through drainage       Right medial thigh: Apply skin prep to periwound and allow to dry   Loosely tuck 1/4" Iodoform packing strip into wound and use a small piece of tape to secure tail to skin  Cover with Allevyn non bordered foam and change daily or as needed for strike through drainage       Right posterior knee: Wash with soap and water then pat dry  Apply Maxorb Ag Rope to to inner aspect of knee, Change daily or as needed for strikethrough drainage       Right Lower Abdomen: Irrigate with normal saline then pat dry  Line underside of pannus with Maxorb Ag Rope and change daily       Wash skin folds with soap and water then pat dry  Dust skin folds underneath breasts, bilateral groin and torso with Nystatin powder three times daily       Apply Lac-Hydrin (ammonium lactate) lotion to bilateral legs avoiding wound base one to two times a day      Skin care Plan:  1-Protect sacrum w/Allevyn foam, luis enrique with P, change q3d and PRN, check skin q-shift  2-Turn/reposition q2h or when medically stable for pressure re-distribution on skin   3-Elevate heels to offload pressure]  4-Moisturize skin daily with skin nourishing cream  5-Ehob cushion in chair when out of bed  6-Hydraguard to bilateral heels BID and PRN        Morbid obesity with BMI of 70 and over, adult [facility])  Assessment & Plan  · Lifestyle modifications are recommended including weight loss, improving her diet, and increasing her amount of activity  · Check an overnight sleep screen  · She will need an outpatient sleep study completed as soon as possible to check for obstructive sleep apnea  · Outpatient Bariatric Surgery evaluation    Transaminitis  Assessment & Plan  · Check an acute hepatitis panel  · Avoid all hepatotoxic agents  · Follow the LFT's (liver function tests)    Lymphedema  Assessment & Plan  · Chronic lymphedema of the bilateral lower extremities  · Outpatient lymphedema therapy      VTE Pharmacologic Prophylaxis:   Pharmacologic: Enoxaparin (Lovenox) 60 mg SQ every 12 hours (Increased dose based on the patient's BMI)  Mechanical VTE Prophylaxis in Place:  No with cellulitis of the right lower extremity and bilateral lower extremity venous stasis dermatitis    Patient Centered Rounds: I have performed bedside rounds with nursing staff today  Time Spent for Care: 30 minutes  More than 50% of total time spent on counseling and coordination of care as described above  Current Length of Stay: 4 day(s)    Current Patient Status: Inpatient   Certification Statement: The patient will continue to require additional inpatient hospital stay due to the need for IV antibiotic treatment  Code Status: Level 1 - Full Code      Subjective: The patient was seen and examined  The patient complains of right shin pain and severe left foot pain  Objective:     Vitals:   Temp (24hrs), Av 2 °F (36 8 °C), Min:97 9 °F (36 6 °C), Max:98 4 °F (36 9 °C)    Temp:  [97 9 °F (36 6 °C)-98 4 °F (36 9 °C)] 97 9 °F (36 6 °C)  HR:  [68-81] 68  Resp:  [14-18] 18  BP: (120-124)/(57) 120/57  SpO2:  [97 %-98 %] 97 %  Body mass index is 75 57 kg/m²  Input and Output Summary (last 24 hours):        Intake/Output Summary (Last 24 hours) at 2020 1436  Last data filed at 2020 1411  Gross per 24 hour   Intake    Output 300 ml   Net -300 ml       Physical Exam:     Physical Exam  General:  NAD, awake, alert, follows commands  HEENT:  NC/AT, mucous membranes moist  Neck:  Supple, No JVP elevation  CV:  + S1, + S2, RRR  Pulm:  Lung fields are CTA bilaterally  Abd:  Soft, Non-tender, Non-distended  Ext:  No clubbing/cyanosis, Edema of the bilateral lower extremities  Skin:  Erythema of the right anterior shin, Venous stasis dermatitis of the bilateral anterior shins      Additional Data:    Labs:    Results from last 7 days   Lab Units 20  0622   WBC Thousand/uL 7 09   HEMOGLOBIN g/dL 9 9*   HEMATOCRIT % 37 7   PLATELETS Thousands/uL 296   NEUTROS PCT % 65   LYMPHS PCT % 13*   MONOS PCT % 11   EOS PCT % 11*     Results from last 7 days   Lab Units 20  0622   SODIUM mmol/L 141   POTASSIUM mmol/L 3 7   CHLORIDE mmol/L 104   CO2 mmol/L 34*   BUN mg/dL 11   CREATININE mg/dL 0 97   ANION GAP mmol/L 3*   CALCIUM mg/dL 7 8*   ALBUMIN g/dL 1 9*   TOTAL BILIRUBIN mg/dL 0 63   ALK PHOS U/L 74   ALT U/L 51   AST U/L 38   GLUCOSE RANDOM mg/dL 81     Results from last 7 days   Lab Units 12/28/19  1250   INR  1 31*             Results from last 7 days   Lab Units 01/01/20  0623 01/01/20  0622 12/28/19  1250   LACTIC ACID mmol/L  --  0 9 1 8   PROCALCITONIN ng/ml 0 11  --   --            * I Have Reviewed All Lab Data Listed Above  * Additional Pertinent Lab Tests Reviewed: Patricio 66 Admission Reviewed      Recent Cultures (last 7 days):     Results from last 7 days   Lab Units 12/28/19 2039 12/28/19  1250   BLOOD CULTURE   --  No Growth at 72 hrs  No Growth at 72 hrs  URINE CULTURE  30,000-39,000 cfu/ml   --        Last 24 Hours Medication List:     Current Facility-Administered Medications:  albuterol 2 5 mg Nebulization Q6H PRN RICK Sr    cholecalciferol 1,000 Units Oral Daily Uri Abbott DO    enoxaparin 60 mg Subcutaneous Q12H [facility] custod[facility] Hilario Valdes DO    FLUoxetine 20 mg Oral Daily Uri Abbott DO    levothyroxine 125 mcg Oral Early Morning Uri Abbott DO    nystatin  Topical BID Chasidy Altamirano MD    ondansetron 4 mg Intravenous Q8H PRN RICK Barnes    piperacillin-tazobactam 3 375 g Intravenous Q6H Chasidy Altamirano MD Last Rate: 3 375 g (01/01/20 0601)   vancomycin 1,500 mg Intravenous Q12H Chasidy Altamirano MD Last Rate: 1,500 mg (01/01/20 1001)        Today, Patient Was Seen By: Uri Abbott DO    ** Please Note: Dictation voice to text software may have been used in the creation of this document   **

## 2020-01-01 NOTE — PHYSICAL THERAPY NOTE
PHYSICAL THERAPY TREATMENT NOTE  NAME:  Love Benson  DATE: 01/01/20    Length Of Stay: 4  Performed at least 2 patient identifiers during session: Name and Birthday    TREATMENT:    01/01/20 1707   Pain Assessment   Pain Assessment No/denies pain   Pain Score No Pain   Restrictions/Precautions   Other Precautions Fall Risk;O2;Multiple lines   General   Chart Reviewed Yes   Additional Pertinent History Pt with L ankle and foot xrays ordered and taken, not read  Spoke with Dr Scottie Henderson [facility] plan for treatment  No WB activity this date, therapeutic exercise excluding L ankle  Okay to see patient  Response to Previous Treatment Patient with no complaints from previous session  Subjective   Subjective "I'm not awake yet "   Bed Mobility   Additional Comments Pt required dependent assistance of 4 to reposition in bed with bed in trendelenburg  Transfers   Sit to Stand   (deferred this date awaiting result of L ankle/foot imaging)   Additional Comments Utilized bed to position patient in sitting via chair position  L LE NWB throughout  completed seated therapeutic exercise with rest between sets and extremities  Completed anterior weight shifting of trunk (modified sit up) utilizing UEs on bedrails with bed in chair position to pull trunk anteriorly to prepare for transfers when appropriate  Moderate-->minimal assistance to complete  Activity Tolerance   Activity Tolerance Patient limited by pain; Patient limited by fatigue   Medical Staff Made Aware PCA   Nurse Made Aware RN   Exercises   Hip Flexion Sitting;15 reps;AAROM; Bilateral   Knee AROM Long Arc Quad 15 reps; Sitting;AAROM; Right  (pt w c/o L knee pain when attempted  not completed d/t pain)   Ankle Pumps Sitting;25 reps;AROM; Right   Assessment   Prognosis Fair   Problem List Decreased strength;Decreased range of motion;Decreased endurance;Decreased mobility; Impaired balance;Decreased safety awareness; Impaired judgement;Obesity; Decreased skin integrity;Pain   Goals   PT Treatment Day 1   Plan   Treatment/Interventions Functional transfer training;LE strengthening/ROM; Therapeutic exercise; Endurance training;Patient/family training;Equipment eval/education; Bed mobility;Gait training;Spoke to nursing   Progress No functional improvements  (no transfers or WB activity completed; awaiting imaging)   PT Frequency Other (Comment)  (3-5x/week)   Recommendation   Recommendation Short-term skilled PT   Equipment Recommended Walker; Wheelchair       Pt tolerated session fairly limited by c/o pain L knee with attempted LAQ on L  Left ankle DF/PF deferred due to pending imaging with L LE NWB throughout session  However, patient did actively DF/PF L ankle without complaint ~ 3 times when completing R ankle DF/PF despite instruction not to complete on L LE  Pt instructed again to not complete on L LE due to awaiting result of imaging  Pt requires increased assistance to wt shift anteriorly with cues for controlled descent posteriorly and requires Ax4 to reposition in bed with bed in trendeleburg position for patient and staff safety to facilitate repositioning  She requires encouragement to participate  She is limited by pain, decreased strength, ROM, balance, endurance  Continue PT services to increase mobility as able      Angela Bernal, PT,DPT

## 2020-01-01 NOTE — UTILIZATION REVIEW
Continued Stay Review    Date: 1/1                          Current Patient Class: IP Current Level of Care: MS    HPI:59 y o  female initially admitted on 12/28    Assessment/Plan: admitted w/ cellulitis RLE cont on IV abx   Encephalopathy has improved  Pt c/o pain L foot since fall during hospitalization   xrays completed today and are pending    1/1 1222 Resp Note   Episode where O2 sat dropped to 40s while asleep  attempted bipap , pt unable to tolerate on her face and switched to NC   Pertinent Labs/Diagnostic Results:   1/1 L tib /fib xray -pending   1/1 L foot xray - pending   Results from last 7 days   Lab Units 01/01/20  0622 12/30/19  0626 12/29/19  0508  12/28/19  1250   WBC Thousand/uL 7 09 6 41 8 61  --  9 69   HEMOGLOBIN g/dL 9 9* 10 2* 10 6*  --  12 0   HEMATOCRIT % 37 7 37 6 38 1  --  42 7   PLATELETS Thousands/uL 296 313 301   < > 373   NEUTROS ABS Thousands/µL 4 56 3 69 6 37  --  7 64*    < > = values in this interval not displayed       Results from last 7 days   Lab Units 01/01/20  0622 12/31/19  0721 12/30/19  0626 12/29/19  0508 12/28/19  1250   SODIUM mmol/L 141 140 140 138 139   POTASSIUM mmol/L 3 7 3 8 3 7 3 5 3 9   CHLORIDE mmol/L 104 102 103 102 98*   CO2 mmol/L 34* 35* 36* 33* 34*   ANION GAP mmol/L 3* 3* 1* 3* 7   BUN mg/dL 11 12 13 13 16   CREATININE mg/dL 0 97 1 04 1 25 1 17 1 30   EGFR ml/min/1 73sq m 64 59 47 51 45   CALCIUM mg/dL 7 8* 7 6* 7 4* 7 4* 8 2*   MAGNESIUM mg/dL 2 2  --   --  2 0  --    PHOSPHORUS mg/dL 3 1  --   --  3 0  --      Results from last 7 days   Lab Units 01/01/20  0622 12/30/19  0626 12/29/19  0508 12/28/19  1250   AST U/L 38 110* 188* 355*   ALT U/L 51 97* 124* 176*   ALK PHOS U/L 74 82 89 112   TOTAL PROTEIN g/dL 6 6 6 6 6 4 8 0   ALBUMIN g/dL 1 9* 2 0* 1 9* 2 5*   TOTAL BILIRUBIN mg/dL 0 63 0 74 1 12* 1 76*     Results from last 7 days   Lab Units 01/01/20  0622 12/31/19  0721 12/30/19  0626 12/29/19  0508 12/28/19  1250   GLUCOSE RANDOM mg/dL 81 84 91 86 93       Results from last 7 days   Lab Units 01/01/20  0622   CK TOTAL U/L 40     Results from last 7 days   Lab Units 01/01/20  0622 12/29/19  1213 12/28/19  1250   TROPONIN I ng/mL <0 02 0 04 0 10*     Results from last 7 days   Lab Units 12/28/19  1250   PROTIME seconds 16 3*   INR  1 31*   PTT seconds 38*     Results from last 7 days   Lab Units 01/01/20  0623   PROCALCITONIN ng/ml 0 11     Results from last 7 days   Lab Units 01/01/20  0622 12/28/19  1250   LACTIC ACID mmol/L 0 9 1 8     Results from last 7 days   Lab Units 12/28/19 2039   CLARITY UA  Cloudy   COLOR UA  Yellow   SPEC GRAV UA  <=1 005   PH UA  6 5   GLUCOSE UA mg/dl Negative   KETONES UA mg/dl Negative   BLOOD UA  Trace-Intact*   PROTEIN UA mg/dl Negative   NITRITE UA  Negative   BILIRUBIN UA  Small*   UROBILINOGEN UA E U /dl 1 0   LEUKOCYTES UA  Large*   WBC UA /hpf 10-20*   RBC UA /hpf 4-10*   BACTERIA UA /hpf Moderate*   EPITHELIAL CELLS WET PREP /hpf Moderate*     Results from last 7 days   Lab Units 12/28/19  1250   INFLUENZA A PCR  None Detected   RSV PCR  None Detected       Results from last 7 days   Lab Units 12/28/19 2039 12/28/19  1250   BLOOD CULTURE   --  No Growth at 72 hrs  No Growth at 72 hrs     URINE CULTURE  30,000-39,000 cfu/ml   --        Vital Signs:   01/01/20 07:38:16  97 9 °F (36 6 °C)  68  18  120/57  78  97 %     01/01/20 0030              Nasal cannula    O2 Device: Pt refused CPAP at 01/01/20 0030   01/01/20 0000    76  16      97 %         Medications:   Scheduled Medications:    Medications:  cholecalciferol 1,000 Units Oral Daily   enoxaparin 60 mg Subcutaneous Q12H [address]   FLUoxetine 20 mg Oral Daily   levothyroxine 125 mcg Oral Early Morning   nystatin  Topical BID   piperacillin-tazobactam 3 375 g Intravenous Q6H   vancomycin 1,500 mg Intravenous Q12H     Continuous IV Infusions:     PRN Meds:    albuterol 2 5 mg Nebulization Q6H PRN   ondansetron 4 mg Intravenous Q8H PRN       Discharge Plan: TBD Network Utilization Review Department  [email or web address]  ATTENTION: Please call with any questions or concerns to [phone] and carefully listen to the prompts so that you are directed to the right person  All voicemails are confidential   Saba Roldan all requests for admission clinical reviews, approved or denied determinations and any other requests to dedicated fax number below belonging to the campus where the patient is receiving treatment   List of dedicated fax numbers for the Facilities:  [address] DENIALS (Administrative/Medical Necessity) [phone]   [address] (Maternity/NICU/Pediatrics) [phone]   Breann Marinelli [phone]   Sharon Garrett [phone]   Vika Mei [phone]   [facility] [phone]   [address] [place] [address] [phone]   [facility]  [phone]   [address] [facility] [address]  [address] [facility] [address] [facility] [phone]

## 2020-01-01 NOTE — ASSESSMENT & PLAN NOTE
· Secondary to fall during the hospitalization  · Check an x-ray of the left foot and left tibia/fibula

## 2020-01-01 NOTE — PLAN OF CARE
Problem: PHYSICAL THERAPY ADULT  Goal: Performs mobility at highest level of function for planned discharge setting  See evaluation for individualized goals  Description  Treatment/Interventions: Functional transfer training, LE strengthening/ROM, Elevations, Therapeutic exercise, Endurance training, Patient/family training, Equipment eval/education, Bed mobility, Gait training, Spoke to nursing, Spoke to case management, OT  Equipment Recommended: Reyes [place], Wheelchair       See flowsheet documentation for full assessment, interventions and recommendations  Outcome: Not Progressing  Note:   Prognosis: Fair  Problem List: Decreased strength, Decreased range of motion, Decreased endurance, Decreased mobility, Impaired balance, Decreased safety awareness, Impaired judgement, Obesity, Decreased skin integrity, Pain  Assessment: Pt tolerated session fairly limited by c/o pain L knee with attempted LAQ on L  Left ankle DF/PF deferred due to pending imaging with L LE NWB throughout session  However, patient did actively DF/PF L ankle without complaint ~ 3 times when completing R ankle DF/PF despite instruction not to complete on L LE  Pt instructed again to not complete on L LE due to awaiting result of imaging  Pt requires increased assistance to wt shift anteriorly with cues for controlled descent posteriorly and requires Ax4 to reposition in bed with bed in trendeleburg position for patient and staff safety to facilitate repositioning  She requires encouragement to participate  She is limited by pain, decreased strength, ROM, balance, endurance  Continue PT services to increase mobility as able  Recommendation: Short-term skilled PT     PT - OK to Discharge: Yes(when medically cleared)    See flowsheet documentation for full assessment

## 2020-01-02 ENCOUNTER — APPOINTMENT (INPATIENT)
Dept: NON INVASIVE DIAGNOSTICS | Facility: HOSPITAL | Age: 60
DRG: 602 | End: 2020-01-02
Payer: MEDICARE

## 2020-01-02 PROBLEM — R26.2 AMBULATORY DYSFUNCTION: Status: ACTIVE | Noted: 2020-01-02

## 2020-01-02 PROBLEM — R13.10 DYSPHAGIA: Status: ACTIVE | Noted: 2020-01-02

## 2020-01-02 PROBLEM — R09.02 HYPOXIA: Status: ACTIVE | Noted: 2020-01-02

## 2020-01-02 LAB
ALBUMIN SERPL BCP-MCNC: 2 G/DL (ref 3.5–5)
ALP SERPL-CCNC: 74 U/L (ref 46–116)
ALT SERPL W P-5'-P-CCNC: 41 U/L (ref 12–78)
ANION GAP SERPL CALCULATED.3IONS-SCNC: -1 MMOL/L (ref 4–13)
AST SERPL W P-5'-P-CCNC: 26 U/L (ref 5–45)
BACTERIA BLD CULT: NORMAL
BACTERIA BLD CULT: NORMAL
BASOPHILS # BLD AUTO: 0.04 THOUSANDS/ΜL (ref 0–0.1)
BASOPHILS NFR BLD AUTO: 1 % (ref 0–1)
BILIRUB SERPL-MCNC: 0.61 MG/DL (ref 0.2–1)
BUN SERPL-MCNC: 11 MG/DL (ref 5–25)
CA-I BLD-SCNC: 1.13 MMOL/L (ref 1.12–1.32)
CALCIUM SERPL-MCNC: 7.9 MG/DL (ref 8.3–10.1)
CHLORIDE SERPL-SCNC: 104 MMOL/L (ref 100–108)
CO2 SERPL-SCNC: 39 MMOL/L (ref 21–32)
CREAT SERPL-MCNC: 0.99 MG/DL (ref 0.6–1.3)
EOSINOPHIL # BLD AUTO: 0.62 THOUSAND/ΜL (ref 0–0.61)
EOSINOPHIL NFR BLD AUTO: 10 % (ref 0–6)
ERYTHROCYTE [DISTWIDTH] IN BLOOD BY AUTOMATED COUNT: 15.5 % (ref 11.6–15.1)
GFR SERPL CREATININE-BSD FRML MDRD: 63 ML/MIN/1.73SQ M
GLUCOSE SERPL-MCNC: 93 MG/DL (ref 65–140)
HCT VFR BLD AUTO: 39.1 % (ref 34.8–46.1)
HGB BLD-MCNC: 10.3 G/DL (ref 11.5–15.4)
IMM GRANULOCYTES # BLD AUTO: 0.02 THOUSAND/UL (ref 0–0.2)
IMM GRANULOCYTES NFR BLD AUTO: 0 % (ref 0–2)
LYMPHOCYTES # BLD AUTO: 0.69 THOUSANDS/ΜL (ref 0.6–4.47)
LYMPHOCYTES NFR BLD AUTO: 11 % (ref 14–44)
MAGNESIUM SERPL-MCNC: 2.2 MG/DL (ref 1.6–2.6)
MCH RBC QN AUTO: 23.8 PG (ref 26.8–34.3)
MCHC RBC AUTO-ENTMCNC: 26.3 G/DL (ref 31.4–37.4)
MCV RBC AUTO: 91 FL (ref 82–98)
MONOCYTES # BLD AUTO: 0.63 THOUSAND/ΜL (ref 0.17–1.22)
MONOCYTES NFR BLD AUTO: 10 % (ref 4–12)
MRSA NOSE QL CULT: NORMAL
NEUTROPHILS # BLD AUTO: 4.4 THOUSANDS/ΜL (ref 1.85–7.62)
NEUTS SEG NFR BLD AUTO: 68 % (ref 43–75)
NRBC BLD AUTO-RTO: 0 /100 WBCS
PHOSPHATE SERPL-MCNC: 3.1 MG/DL (ref 2.7–4.5)
PLATELET # BLD AUTO: 298 THOUSANDS/UL (ref 149–390)
PMV BLD AUTO: 10.2 FL (ref 8.9–12.7)
POTASSIUM SERPL-SCNC: 4 MMOL/L (ref 3.5–5.3)
PROCALCITONIN SERPL-MCNC: 0.11 NG/ML
PROT SERPL-MCNC: 6.9 G/DL (ref 6.4–8.2)
RBC # BLD AUTO: 4.32 MILLION/UL (ref 3.81–5.12)
SODIUM SERPL-SCNC: 142 MMOL/L (ref 136–145)
WBC # BLD AUTO: 6.4 THOUSAND/UL (ref 4.31–10.16)

## 2020-01-02 PROCEDURE — 94760 N-INVAS EAR/PLS OXIMETRY 1: CPT

## 2020-01-02 PROCEDURE — 83735 ASSAY OF MAGNESIUM: CPT | Performed by: INTERNAL MEDICINE

## 2020-01-02 PROCEDURE — 97530 THERAPEUTIC ACTIVITIES: CPT

## 2020-01-02 PROCEDURE — 84100 ASSAY OF PHOSPHORUS: CPT | Performed by: INTERNAL MEDICINE

## 2020-01-02 PROCEDURE — 80053 COMPREHEN METABOLIC PANEL: CPT | Performed by: INTERNAL MEDICINE

## 2020-01-02 PROCEDURE — 99232 SBSQ HOSP IP/OBS MODERATE 35: CPT | Performed by: INTERNAL MEDICINE

## 2020-01-02 PROCEDURE — 97535 SELF CARE MNGMENT TRAINING: CPT

## 2020-01-02 PROCEDURE — 94762 N-INVAS EAR/PLS OXIMTRY CONT: CPT

## 2020-01-02 PROCEDURE — 85025 COMPLETE CBC W/AUTO DIFF WBC: CPT | Performed by: INTERNAL MEDICINE

## 2020-01-02 PROCEDURE — 97110 THERAPEUTIC EXERCISES: CPT

## 2020-01-02 PROCEDURE — 82330 ASSAY OF CALCIUM: CPT | Performed by: INTERNAL MEDICINE

## 2020-01-02 PROCEDURE — 84145 PROCALCITONIN (PCT): CPT | Performed by: INTERNAL MEDICINE

## 2020-01-02 PROCEDURE — 94660 CPAP INITIATION&MGMT: CPT

## 2020-01-02 RX ORDER — AMMONIUM LACTATE 12 G/100G
LOTION TOPICAL 2 TIMES DAILY
Status: DISCONTINUED | OUTPATIENT
Start: 2020-01-02 | End: 2020-01-11 | Stop reason: HOSPADM

## 2020-01-02 RX ORDER — ALBUTEROL SULFATE 2.5 MG/3ML
2.5 SOLUTION RESPIRATORY (INHALATION) EVERY 4 HOURS PRN
Status: DISCONTINUED | OUTPATIENT
Start: 2020-01-02 | End: 2020-01-11 | Stop reason: HOSPADM

## 2020-01-02 RX ADMIN — VANCOMYCIN HYDROCHLORIDE 1250 MG: 1 INJECTION, POWDER, LYOPHILIZED, FOR SOLUTION INTRAVENOUS at 23:27

## 2020-01-02 RX ADMIN — NYSTATIN: 100000 POWDER TOPICAL at 16:30

## 2020-01-02 RX ADMIN — MELATONIN 1000 UNITS: at 09:34

## 2020-01-02 RX ADMIN — FLUOXETINE 20 MG: 20 CAPSULE ORAL at 09:34

## 2020-01-02 RX ADMIN — LEVOTHYROXINE SODIUM 125 MCG: 125 TABLET ORAL at 06:08

## 2020-01-02 RX ADMIN — Medication: at 20:36

## 2020-01-02 RX ADMIN — Medication 3.38 G: at 06:09

## 2020-01-02 RX ADMIN — NYSTATIN: 100000 POWDER TOPICAL at 09:34

## 2020-01-02 RX ADMIN — ENOXAPARIN SODIUM 60 MG: 60 INJECTION SUBCUTANEOUS at 09:34

## 2020-01-02 RX ADMIN — Medication 3.38 G: at 16:34

## 2020-01-02 RX ADMIN — Medication 3.38 G: at 02:19

## 2020-01-02 RX ADMIN — Medication 3.38 G: at 22:30

## 2020-01-02 RX ADMIN — VANCOMYCIN HYDROCHLORIDE 1250 MG: 1 INJECTION, POWDER, LYOPHILIZED, FOR SOLUTION INTRAVENOUS at 11:00

## 2020-01-02 RX ADMIN — ENOXAPARIN SODIUM 60 MG: 60 INJECTION SUBCUTANEOUS at 20:40

## 2020-01-02 NOTE — PLAN OF CARE
Problem: PHYSICAL THERAPY ADULT  Goal: Performs mobility at highest level of function for planned discharge setting  See evaluation for individualized goals  Description  Treatment/Interventions: Functional transfer training, LE strengthening/ROM, Elevations, Therapeutic exercise, Endurance training, Patient/family training, Equipment eval/education, Bed mobility, Gait training, Spoke to nursing, Spoke to case management, OT  Equipment Recommended: Nyra Deal, Wheelchair       See flowsheet documentation for full assessment, interventions and recommendations  Outcome: Progressing  Note:   Prognosis: Fair  Problem List: Decreased strength, Decreased range of motion  Assessment: Pt seen for split PT session this date w/ tx times as noted above  First am session PT only for bed level therex per flow sheet  Pt tolerated fair but is severely limited by body habitus and pannus limiting hip and knee AROM  PT pm tx session performed in conjunction w/ OT and RN/ PCA staffing as pt requires skilled A x3 for safety w/ all mobility  Pt required modA x2 for supine >sit w/ increased time and cues  Once positioned at EOB pt able to sit unsupported w/ close SBA x2 and knees blocked for safety  Marilyn's egress testing at EOB performed w/ standing attempts x3 w/ max/ DEP A x3 for 3 trials  Pt unable to clear buttocks from bed  Pt reports limited by pain in B/L knees and legs also reports "fear of falling" w/ limited effort on attempts noted by lack of quad and glut contraction  Pt stating that she feels she would "just do better at home"  PT had lengthy discussion w/ pt during ama nd pm session in regards to rehab recommendation prior to d/c home 2* CLOF  Pt reprots will consider; but still has strong desire to go home  PT is recommending rehab on d/c and DEP means (lyndsey) for all OOB until able to safely pass egress testing and able to manage own body weight to prevent falls   Will cont to follow and progress w/ goals as outlined on IE  Barriers to Discharge Comments: currently requiring Ax3 for repositioning   Recommendation: Short-term skilled PT     PT - OK to Discharge: (to rehab when medically cleared )    See flowsheet documentation for full assessment

## 2020-01-02 NOTE — ASSESSMENT & PLAN NOTE
· Lifestyle modifications are recommended including weight loss, improving her diet, and increasing her amount of activity    · She will need an outpatient sleep study completed as soon as possible to check for obstructive sleep apnea  · Outpatient Bariatric Surgery evaluation

## 2020-01-02 NOTE — ASSESSMENT & PLAN NOTE
· Improved  · Avoid all hepatotoxic agents  · Follow the LFT's (liver function tests)    Results for [facility] (MRN [id number]) as of 1/2/2020 14:45   Ref   Range 1/1/2020 06:22   HEPATITIS A IGM ANTIBODY Latest Ref Range: Non-reactive, Equivocal-Suggest Recollect  Non-reactive   HEPATITIS B SURFACE ANTIGEN Latest Ref Range: Non-reactive, NonReactive - Confirmed  Non-reactive   HEPATITIS B CORE IGM ANTIBODY Latest Ref Range: Non-reactive  Non-reactive   HEPATITIS C ANTIBODY Latest Ref Range: Non-reactive  Non-reactive

## 2020-01-02 NOTE — SOCIAL WORK
PT has recommended STR for patient  CM discussed at length, options for STR with patient  Pt appears ambivalent to discussion, continues to sts "I could do more before I came here, I was able to get up and do more for myself, get up and walk a bit, now its different  I just want to go back home and be with my skunk and doggie "  Cm discussed that although she claims she was IPTA, she currently is unable to get oob or walk with therapies, STR is recommended as a safe discharge plan  Pt encouraged to consider attending STR until she is again strong enough to do the activities she was doing prior to admission  Pt sts "well, im just not sure, I want to just go home, but I will consider it "  As per pt request, referrals placed to the following STR, [address] [facility], [address] [place] [address], [facility], [facility] and rehab  CM to follow  'A post acute care recommendation was made by your care team for STR  Discussed Freedom of Choice with patient  List of facilities given to patient via in person  patient aware the list is custom filtered for them by zip code location and that [facility] post acute providers are designated

## 2020-01-02 NOTE — PLAN OF CARE
Problem: Potential for Falls  Goal: Patient will remain free of falls  Description  INTERVENTIONS:  - Assess patient frequently for physical needs  -  Identify cognitive and physical deficits and behaviors that affect risk of falls    -  [facility] fall precautions as indicated by assessment   - Educate patient/family on patient safety including physical limitations  - Instruct patient to call for assistance with activity based on assessment  - Modify environment to reduce risk of injury  - Consider OT/PT consult to assist with strengthening/mobility  Outcome: Progressing     Problem: Prexisting or High Potential for Compromised Skin Integrity  Goal: Skin integrity is maintained or improved  Description  INTERVENTIONS:  - Identify patients at risk for skin breakdown  - Assess and monitor skin integrity  - Assess and monitor nutrition and hydration status  - Monitor labs   - Assess for incontinence   - Turn and reposition patient  - Assist with mobility/ambulation  - Relieve pressure over bony prominences  - Avoid friction and shearing  - Provide appropriate hygiene as needed including keeping skin clean and dry  - Evaluate need for skin moisturizer/barrier cream  - Collaborate with interdisciplinary team   - Patient/family teaching  - Consider wound care consult   Outcome: Progressing     Problem: PAIN - ADULT  Goal: Verbalizes/displays adequate comfort level or baseline comfort level  Description  Interventions:  - Encourage patient to monitor pain and request assistance  - Assess pain using appropriate pain scale  - Administer analgesics based on type and severity of pain and evaluate response  - Implement non-pharmacological measures as appropriate and evaluate response  - Consider cultural and social influences on pain and pain management  - Notify physician/advanced practitioner if interventions unsuccessful or patient reports new pain  Outcome: Progressing     Problem: INFECTION - ADULT  Goal: Absence or prevention of progression during hospitalization  Description  INTERVENTIONS:  - Assess and monitor for signs and symptoms of infection  - Monitor lab/diagnostic results  - Administer medications as ordered  - Instruct and encourage patient and family to use good hand hygiene technique  - Identify and instruct in appropriate isolation precautions for identified infection/condition   Outcome: Progressing     Problem: SAFETY ADULT  Goal: Maintain or return to baseline ADL function  Description  INTERVENTIONS:  -  Assess patient's ability to carry out ADLs; assess patient's baseline for ADL function and identify physical deficits which impact ability to perform ADLs (bathing, care of mouth/teeth, toileting, grooming, dressing, etc )  - Assess/evaluate cause of self-care deficits   - Assess range of motion  - Assess patient's mobility; develop plan if impaired  - Assess patient's need for assistive devices and provide as appropriate  - Encourage maximum independence but intervene and supervise when necessary  - Involve family in performance of ADLs  - Assess for home care needs following discharge   - Consider OT consult to assist with ADL evaluation and planning for discharge  - Provide patient education as appropriate  Outcome: Progressing  Goal: Maintain or return mobility status to optimal level  Description  INTERVENTIONS:  - Assess patient's baseline mobility status (ambulation, transfers, stairs, etc )    - Identify cognitive and physical deficits and behaviors that affect mobility  - Identify mobility aids required to assist with transfers and/or ambulation (gait belt, sit-to-stand, lift, walker, cane, etc )  - [place] fall precautions as indicated by assessment  - Record patient progress and toleration of activity level on Mobility SBAR; progress patient to next Phase/Stage  - Instruct patient to call for assistance with activity based on assessment  - Consider rehabilitation consult to assist with strengthening/weightbearing, etc   Outcome: Progressing     Problem: DISCHARGE PLANNING  Goal: Discharge to home or other facility with appropriate resources  Description  INTERVENTIONS:  - Identify barriers to discharge w/patient and caregiver  - Arrange for needed discharge resources and transportation as appropriate  - Identify discharge learning needs (meds, wound care, etc )  - Arrange for interpretive services to assist at discharge as needed  - Refer to Case Management Department for coordinating discharge planning if the patient needs post-hospital services based on physician/advanced practitioner order or complex needs related to functional status, cognitive ability, or social support system  Outcome: Progressing     Problem: Knowledge Deficit  Goal: Patient/family/caregiver demonstrates understanding of disease process, treatment plan, medications, and discharge instructions  Description  Complete learning assessment and assess knowledge base    Interventions:  - Provide teaching at level of understanding  - Provide teaching via preferred learning methods  Outcome: Progressing

## 2020-01-02 NOTE — PLAN OF CARE
Problem: OCCUPATIONAL THERAPY ADULT  Goal: Performs self-care activities at highest level of function for planned discharge setting  See evaluation for individualized goals  Description  Treatment Interventions: ADL retraining, Functional transfer training, UE strengthening/ROM, Endurance training, Patient/family training, Equipment evaluation/education, Compensatory technique education, Continued evaluation, Energy conservation, Activityengagement          See flowsheet documentation for full assessment, interventions and recommendations  Note:   Limitation: Decreased ADL status, Decreased UE strength, Decreased Safe judgement during ADL, Decreased endurance, Decreased high-level ADLs  Prognosis: Fair  Assessment: Pt seen for treatment session #1 this date  Pt alert and agreeable to participate in therapy at this time  Therapy to focus on bed mobility, functional transfers, and standing tolerance  Pt requiring Mod x's 2 for supine>sit EOB  Pt maintaining unsupported sitting @ F+  therapist attempting to stand with Pt, with Max A x's 2 and 2 additional aides assisting with blocking Pt's knees, Pt unable to raise buttocks from EOB despite x's 3 attempts  Pt requiring Max x's 3 for sit>supine  Total A for repositioning in bed  Pt completing bridging in bed with good tolerance  Pt appearing to give minimal effort during STS transfer , pt continuously making comments, stating "I can do this from by bed at home, I just use my cane " Pt requires PT /OT co-treat due to signficant assistance with mobility and cognitive-behavioral impairments as well as to maximize Pt's performance, participation, and functional outcomes   Pt continues to present with decrease activity tolerance, decrease standing balance, decrease sitting balance, decrease performance during ADL tasks, decrease cognition, decrease safety awareness , decrease BUE ROM, decrease UB MS, increased pain, decrease generalized strength, decrease activity engagement, decrease performance during functional transfers, decrease sensation and decrease GM control  Pt would benefit from continued acute OT services to address deficits as well as post acute rehab upon d/c from [address] [place] [address]  Pt would not be safe to d/c home at this time    Recommendation: (post acute rehab)  OT Discharge Recommendation: (post acute rehab)

## 2020-01-02 NOTE — ASSESSMENT & PLAN NOTE
· Probable undiagnosed obesity-hypoventilation syndrome and severe obstructive sleep apnea  · Trial Bipap QHS for a goal oxygen saturation of 90% and above  · Utilize supplemental oxygen via the nasal cannula to maintain oxygen saturation levels at 90% and above  · She will need an outpatient sleep study completed as soon as possible to check for obstructive sleep apnea  · Outpatient Bariatric Surgery evaluation

## 2020-01-02 NOTE — ASSESSMENT & PLAN NOTE
· Present on admission  · Likely secondary to acute infection and possibly secondary to hypoxia  · The patient's neurologic status has improved

## 2020-01-02 NOTE — PROGRESS NOTES
Vancomycin Assessment    Ghazala Lau is a 61 y o  female who is currently receiving vancomycin 1500 mg iv q 12 hours for skin-soft tissue infection     Relevant clinical data and objective history reviewed:  Creatinine   Date Value Ref Range Status   01/01/2020 0 97 0 60 - 1 30 mg/dL Final     Comment:     Standardized to IDMS reference method   12/31/2019 1 04 0 60 - 1 30 mg/dL Final     Comment:     Standardized to IDMS reference method   12/30/2019 1 25 0 60 - 1 30 mg/dL Final     Comment:     Standardized to IDMS reference method     /58   Pulse 73   Temp 97 9 °F (36 6 °C)   Resp 18   Ht 5' 3" (1 6 m)   Wt (!) 194 kg (426 lb 9 6 oz)   SpO2 95%   BMI 75 57 kg/m²   I/O last 3 completed shifts: In: 360 [P O :360]  Out: 300 [Urine:300]  Lab Results   Component Value Date/Time    BUN 11 01/01/2020 06:22 AM    WBC 7 09 01/01/2020 06:22 AM    HGB 9 9 (L) 01/01/2020 06:22 AM    HCT 37 7 01/01/2020 06:22 AM    MCV 89 01/01/2020 06:22 AM     01/01/2020 06:22 AM     Temp Readings from Last 3 Encounters:   01/01/20 97 9 °F (36 6 °C)     Vancomycin Days of Therapy: 5    Assessment/Plan  The patient is currently on vancomycin utilizing scheduled dosing  Baseline risks associated with therapy include: concomitant nephrotoxic medications  The patient is receiving 1500 mg iv q 12 hours with the most recent vancomycin level being at steady-state and supratherapeutic (21 1)based on a goal of 15-20 (appropriate for most indications) ; therefore, after clinical evaluation will be changed to 1250 mg iv q 12 hours   Pharmacy will continue to follow closely for s/sx of nephrotoxicity, infusion reactions and appropriateness of therapy  BMP and CBC will be ordered per protocol  Plan for trough as patient approaches steady state, prior to the 4th  dose at approximately 22:00 on 01/03/2012  Pharmacy will continue to follow the patients culture results and clinical progress daily      Asher Cary Pharmacist

## 2020-01-02 NOTE — SOCIAL WORK
[facility] is willing to accept pt upon d/c  Pt is in agreement with d/c plan      CM will continue to follow

## 2020-01-02 NOTE — PHYSICAL THERAPY NOTE
Physical Therapy Treatment (split session) [id number]; 5849-4175 (total tx time (69mins))    Patient Name: Dillard Schlatter    Today's Date: 1/2/2020     Problem List  Principal Problem:    Cellulitis of right lower extremity  Active Problems:    Lymphedema    Acute metabolic encephalopathy    Transaminitis    Morbid obesity with BMI of 70 and over, adult ([facility] [address] )    Skin breakdown    Elevated troponin level    Left foot pain    Acquired hypothyroidism    Anemia       Past Medical History  Past Medical History:   Diagnosis Date    Disease of thyroid gland     Lymphedema     Morbid obesity due to excess calories ([facility] [address] )     Renal disorder         Past Surgical History  History reviewed  No pertinent surgical history  01/02/20 1344   Pain Assessment   Pain Assessment 0-10   Pain Score   (does not provide score- "knees hurt" )   Pain Type Acute pain;Chronic pain   Pain Location   (generalized pain all over and in knees at EOB )   Clinical Progression Gradually worsening   Effect of Pain on Daily Activities unable to stand/ limited mobility    Patient's Stated Pain Goal No pain   Cognition   Overall Cognitive Status [facility]   Arousal/Participation Alert; Responsive   Attention Within functional limits   Orientation Level Oriented X4   Memory Within functional limits   Comments pt w/ very poor judgemetn and awareness of current deficits      Subjective   Subjective "I would be able to do this if I was at home"    Bed Mobility   Supine to Sit 3  Moderate assistance   Additional items Assist x 2  (LE and trunk management )   Sit to Supine 1  Dependent   Additional items Assist x 3   Transfers   Sit to Stand 2  Maximal assistance   Additional items Assist x 3  (attempted x3 via marilyn's egress method unable to clear )   Additional Comments Marilyn's egree method utilized for attempted sitsit<>stand x3 attempts at EOB- pt unsuccessful and unable to clear buttocks w/ poor effort and minimal glue and quad contraction to facilitate standing  pina in B/L knees also reported- pt admitting to fear also limiting effort  returned to bed w/ DEP ax3 BTB and repositioning in supine   Balance   Static Sitting Fair  (once positioned EOB )   Activity Tolerance   Activity Tolerance Patient limited by pain; Patient limited by fatigue   Medical Staff Made Aware OT/ RN PCA   Nurse Made Aware yes- RN Brandy    Exercises   Quad Sets Supine;10 reps;Right;Left   Hip Abduction Supine;10 reps;AAROM; Right;Left  (x2)   Hip Adduction Supine;10 reps;AAROM; Right;Left;PROM   Knee AROM Short Arc Quad Sitting;10 reps;Right;Left  (partial sit w/ bed in chair position )   Knee AROM Long Arc Quad 10 reps; Sitting;AAROM;PROM; Right;Left   Ankle Pumps Sitting;25 reps  (x2)   Bridging 5 reps; Supine  (able to achieve partial bridge w/ bed flat for repositioning)   Assessment   Prognosis Fair   Problem List Decreased strength;Decreased range of motion   Assessment Pt seen for split PT session this date w/ tx times as noted above  First am session PT only for bed level therex per flow sheet  Pt tolerated fair but is severely limited by body habitus and pannus limiting hip and knee AROM  PT pm tx session performed in conjunction w/ OT and RN/ PCA staffing as pt requires skilled A x3 for safety w/ all mobility  Pt required modA x2 for supine >sit w/ increased time and cues  Once positioned at EOB pt able to sit unsupported w/ close SBA x2 and knees blocked for safety  Marilyn's egress testing at EOB performed w/ standing attempts x3 w/ max/ DEP A x3 for 3 trials  Pt unable to clear buttocks from bed  Pt reports limited by pain in B/L knees and legs also reports "fear of falling" w/ limited effort on attempts noted by lack of quad and glut contraction  Pt stating that she feels she would "just do better at home"  PT had lengthy discussion w/ pt during ama nd pm session in regards to rehab recommendation prior to d/c home 2* CLOF   Pt reprots will consider; but still has strong desire to go home  PT is recommending rehab on d/c and DEP means (lyndsey) for all OOB until able to safely pass egress testing and able to manage own body weight to prevent falls  Will cont to follow and progress w/ goals as outlined on IE  Barriers to Discharge Comments currently requiring Ax3 for repositioning    Goals   Patient Goals to go home to my pets    STG Expiration Date 01/10/20   PT Treatment Day 2   Plan   Treatment/Interventions LE strengthening/ROM; Therapeutic exercise; Endurance training;Patient/family training;Equipment eval/education; Bed mobility; Compensatory technique education;Spoke to nursing;Spoke to case management;OT   Progress Slow progress, decreased activity tolerance   PT Frequency   (3-5x/wk )   Recommendation   Recommendation Short-term skilled PT   Equipment Recommended Wheelchair;Walker  (ravindra- [facility] at rehab )   PT - OK to Discharge   (to rehab when medically cleared )

## 2020-01-02 NOTE — PLAN OF CARE
Problem: Potential for Falls  Goal: Patient will remain free of falls  Description  INTERVENTIONS:  - Assess patient frequently for physical needs  -  Identify cognitive and physical deficits and behaviors that affect risk of falls    -  [place] fall precautions as indicated by assessment   - Educate patient/family on patient safety including physical limitations  - Instruct patient to call for assistance with activity based on assessment  - Modify environment to reduce risk of injury  - Consider OT/PT consult to assist with strengthening/mobility  Outcome: Progressing     Problem: Prexisting or High Potential for Compromised Skin Integrity  Goal: Skin integrity is maintained or improved  Description  INTERVENTIONS:  - Identify patients at risk for skin breakdown  - Assess and monitor skin integrity  - Assess and monitor nutrition and hydration status  - Monitor labs   - Assess for incontinence   - Turn and reposition patient  - Assist with mobility/ambulation  - Relieve pressure over bony prominences  - Avoid friction and shearing  - Provide appropriate hygiene as needed including keeping skin clean and dry  - Evaluate need for skin moisturizer/barrier cream  - Collaborate with interdisciplinary team   - Patient/family teaching  - Consider wound care consult   Outcome: Progressing     Problem: PAIN - ADULT  Goal: Verbalizes/displays adequate comfort level or baseline comfort level  Description  Interventions:  - Encourage patient to monitor pain and request assistance  - Assess pain using appropriate pain scale  - Administer analgesics based on type and severity of pain and evaluate response  - Implement non-pharmacological measures as appropriate and evaluate response  - Consider cultural and social influences on pain and pain management  - Notify physician/advanced practitioner if interventions unsuccessful or patient reports new pain  Outcome: Progressing     Problem: INFECTION - ADULT  Goal: Absence or prevention of progression during hospitalization  Description  INTERVENTIONS:  - Assess and monitor for signs and symptoms of infection  - Monitor lab/diagnostic results  - Administer medications as ordered  - Instruct and encourage patient and family to use good hand hygiene technique  - Identify and instruct in appropriate isolation precautions for identified infection/condition   Outcome: Progressing     Problem: SAFETY ADULT  Goal: Maintain or return to baseline ADL function  Description  INTERVENTIONS:  -  Assess patient's ability to carry out ADLs; assess patient's baseline for ADL function and identify physical deficits which impact ability to perform ADLs (bathing, care of mouth/teeth, toileting, grooming, dressing, etc )  - Assess/evaluate cause of self-care deficits   - Assess range of motion  - Assess patient's mobility; develop plan if impaired  - Assess patient's need for assistive devices and provide as appropriate  - Encourage maximum independence but intervene and supervise when necessary  - Involve family in performance of ADLs  - Assess for home care needs following discharge   - Consider OT consult to assist with ADL evaluation and planning for discharge  - Provide patient education as appropriate  Outcome: Progressing  Goal: Maintain or return mobility status to optimal level  Description  INTERVENTIONS:  - Assess patient's baseline mobility status (ambulation, transfers, stairs, etc )    - Identify cognitive and physical deficits and behaviors that affect mobility  - Identify mobility aids required to assist with transfers and/or ambulation (gait belt, sit-to-stand, lift, walker, cane, etc )  - [place] fall precautions as indicated by assessment  - Record patient progress and toleration of activity level on Mobility SBAR; progress patient to next Phase/Stage  - Instruct patient to call for assistance with activity based on assessment  - Consider rehabilitation consult to assist with strengthening/weightbearing, etc   Outcome: Progressing     Problem: DISCHARGE PLANNING  Goal: Discharge to home or other facility with appropriate resources  Description  INTERVENTIONS:  - Identify barriers to discharge w/patient and caregiver  - Arrange for needed discharge resources and transportation as appropriate  - Identify discharge learning needs (meds, wound care, etc )  - Arrange for interpretive services to assist at discharge as needed  - Refer to Case Management Department for coordinating discharge planning if the patient needs post-hospital services based on physician/advanced practitioner order or complex needs related to functional status, cognitive ability, or social support system  Outcome: Progressing     Problem: Knowledge Deficit  Goal: Patient/family/caregiver demonstrates understanding of disease process, treatment plan, medications, and discharge instructions  Description  Complete learning assessment and assess knowledge base    Interventions:  - Provide teaching at level of understanding  - Provide teaching via preferred learning methods  Outcome: Progressing

## 2020-01-02 NOTE — ASSESSMENT & PLAN NOTE
· The patient was evaluated by physical therapy and occupational therapy during the hospitalization  · The patient requires short-term rehabilitation placement upon discharge

## 2020-01-02 NOTE — PROGRESS NOTES
Vancomycin IV Pharmacy-to-Dose Consultation    Mohit Pascal is a 61 y o  female who is currently receiving Vancomycin IV with management by the Pharmacy Consult service  Assessment/Plan:  The patient was reviewed  Renal function is stable and no signs or symptoms of nephrotoxicity and/or infusion reactions were documented in the chart  Based on todays assessment, continue current vancomycin (day # 6) dosing of 1250 mg every 12 hrs, with a plan for trough to be drawn at 22:00 on 1/3/20  We will continue to follow the patients culture results and clinical progress daily      Сергей Marquis, Pharmacist

## 2020-01-02 NOTE — OCCUPATIONAL THERAPY NOTE
OccupationalTherapy Progress Note     Patient Name: Andrea Álvarez  XYYEO'Y Date: 1/2/2020  Problem List  Principal Problem:    Cellulitis of right lower extremity  Active Problems:    Lymphedema    Acute metabolic encephalopathy    Transaminitis    Morbid obesity with BMI of 70 and over, adult [facility])    Skin breakdown    Elevated troponin level    Left foot pain    Acquired hypothyroidism    Anemia            01/02/20 1305   Restrictions/Precautions   Other Precautions Chair Alarm; Bed Alarm;O2;Fall Risk;Pain   Pain Assessment   Pain Assessment No/denies pain   Pain Score No Pain   Bed Mobility   Rolling R 4  Minimal assistance   Additional items Assist x 1; Increased time required;LE management;Verbal cues; Bedrails   Rolling L 4  Minimal assistance   Additional items Assist x 1; Increased time required;Verbal cues;LE management; Bedrails   Supine to Sit 3  Moderate assistance   Additional items Assist x 2; Increased time required; Bedrails;Verbal cues;LE management   Sit to Supine 2  Maximal assistance   Additional items Assist x 3; Increased time required; Bedrails;Verbal cues;LE management   Transfers   Additional Comments attempted STS from EOB @ Max x's 2 and an additional 2 people for supporting B knee, pt unable to complete STS or even raising buttocks from EOB  Pt appears to put minimal effort towards task despite vc'ing and motivation  STS transfer unsuccessful at this time  Cognition   Arousal/Participation Alert; Responsive; Cooperative   Attention Attends with cues to redirect   Orientation Level Oriented X4   Following Commands Follows one step commands without difficulty   Comments Pt's functional cognition is intact although motivation, comprehension and judgement is impaired  Activity Tolerance   Activity Tolerance Patient limited by fatigue;Patient limited by pain   Medical Staff Made Aware Spoke with Adry Apodaca  Spoke with RNBrandy  Assessment   Assessment Pt seen for treatment session #1 this date  Pt alert and agreeable to participate in therapy at this time  Therapy to focus on bed mobility, functional transfers, and standing tolerance  Pt requiring Mod x's 2 for supine>sit EOB  Pt maintaining unsupported sitting @ F+  therapist attempting to stand with Pt, with Max A x's 2 and 2 additional aides assisting with blocking Pt's knees, Pt unable to raise buttocks from EOB despite x's 3 attempts  Pt requiring Max x's 3 for sit>supine  Total A for repositioning in bed  Pt completing bridging in bed with good tolerance  Pt appearing to give minimal effort during STS transfer , pt continuously making comments, stating "I can do this from by bed at home, I just use my cane " Pt requires PT /OT co-treat due to signficant assistance with mobility and cognitive-behavioral impairments as well as to maximize Pt's performance, participation, and functional outcomes  Pt continues to present with decrease activity tolerance, decrease standing balance, decrease sitting balance, decrease performance during ADL tasks, decrease cognition, decrease safety awareness , decrease BUE ROM, decrease UB MS, increased pain, decrease generalized strength, decrease activity engagement, decrease performance during functional transfers, decrease sensation and decrease GM control  Pt would benefit from continued acute OT services to address deficits as well as post acute rehab upon d/c from [address] [place] [address]  Pt would not be safe to d/c home at this time  Plan   Treatment Interventions ADL retraining;Functional transfer training;UE strengthening/ROM; Endurance training;Cognitive reorientation;Equipment evaluation/education;Patient/family training; Compensatory technique education;Continued evaluation; Energy conservation; Activityengagement   Goal Expiration Date 01/10/20   OT Treatment Day 1   OT Frequency 3-5x/wk   Recommendation   Recommendation   (post acute rehab)   OT Discharge Recommendation   (post acute rehab)         Pt goals that were establish on initial evaluation continue to be appropriate at this time  Pt goals to be met by 1/10/20     1  Pt will demonstrate ability to complete supine<>sit @ Mod I in order to increase safety and independence during ADL tasks  2  Pt will demonstrate ability to complete UB ADLs including washing/dressing @ Mod I in order to increase performance and participation during meaningful tasks  3  Pt will demonstrate ability to complete LB dressing @ Mod A in order to increase safety and independence during meaningful tasks  4  Pt will demonstrate ability to complete toileting tasks including CM and pericare @ Mod A in order to increase safety and independence during meaningful tasks  5  Pt will demonstrate ability to complete EOB, chair, toilet/commode transfers @ Mod A in order to increase performance and participation during functional tasks  6  Pt will demonstrate ability to stand for 5 minutes while maintaining G balance with use of RW for UB support PRN  7  Pt will demonstrate ability to tolerate 30-35 minute OT session with no vc'ing for deep breathing or use of energy conservation techniques in order to increase activity tolerance during functional tasks  8  Pt will demonstrate Good carryover of use of energy conservation/compensatory strategies during ADLs and functional tasks in order to increase safety and reduce risk for falls  9  Pt will demonstrate Good attention and participation in continued evaluation of functional ambulation house hold distances in order to assist with safe d/c planning  10  Pt will attend to continued cognitive assessments 100% of the time in order to provide most appropriate d/c recommendations     11  Pt will demonstrate 100% carryover of BUE HEP in order to increase BUE MS and increase performance during functional tasks upon d/c home      Joni Mcburney, OTR/ALEXIS

## 2020-01-02 NOTE — PROGRESS NOTES
[facility] 1960, 61 y [facility]  [facility] MRN: [id number]    Unit/Bed#: -01 Encounter: [id number]    Primary Care Provider: Jacob Levy DO   Date and time admitted to hospital: 12/28/2019 12:14 PM        * Cellulitis of right lower extremity  Assessment & Plan  · Continue IV zosyn and IV vancomycin for now  · IV vancomycin dosing per pharmacy with an elevated vancomycin trough level  · Follow culture results  · PT/OT    MRSA culture [[id number]] (Normal) Collected: 12/31/19 1925   Lab Status: Final result Specimen: Nares from Nose Updated: 01/02/20 1241    MRSA Culture Only No Methicillin Resistant Staphlyococcus aureus (MRSA) isolated         Hypoxia  Assessment & Plan  · Probable undiagnosed obesity-hypoventilation syndrome and severe obstructive sleep apnea  · Trial Bipap QHS for a goal oxygen saturation of 90% and above  · Utilize supplemental oxygen via the nasal cannula to maintain oxygen saturation levels at 90% and above  · She will need an outpatient sleep study completed as soon as possible to check for obstructive sleep apnea  · Outpatient Bariatric Surgery evaluation    Acute metabolic encephalopathy  Assessment & Plan  · Present on admission  · Likely secondary to acute infection and possibly secondary to hypoxia  · The patient's neurologic status has improved    Ambulatory dysfunction  Assessment & Plan  · The patient was evaluated by physical therapy and occupational therapy during the hospitalization  · The patient requires short-term rehabilitation placement upon discharge    Anemia  Assessment & Plan  · Check an iron panel, vitamin B12 level, and folate level  · Follow the CBC  · Transfuse for a hemoglobin less than 7 g/dl    Acquired hypothyroidism  Assessment & Plan  · Continue her home dose of PO levothyroxine  Outpatient follow-up with PCP in regards to this matter      Left foot pain  Assessment & Plan  · Secondary to fall during the hospitalization    XR foot 2 vw left   Status: Final result   PACS Images      Show images for XR foot 2 vw left   Study Result     LEFT FOOT     INDICATION:   left foot pain, trauma, can be done portable      COMPARISON:  None     VIEWS:  XR FOOT 2 VW LEFT         FINDINGS:     There is no acute fracture or dislocation      No significant degenerative changes      No lytic or blastic lesions seen      Soft tissues are unremarkable      IMPRESSION:     No acute osseous abnormality         XR tibia fibula 2 vw left   Status: Final result   PACS Images      Show images for XR tibia fibula 2 vw left   Study Result     LEFT TIBIA AND FIBULA     INDICATION:   left ankle pain, can be done portable      COMPARISON:  None     VIEWS:  XR TIBIA FIBULA 2 VW LEFT         FINDINGS:     There is no acute fracture or dislocation      Advanced knee joint osteoarthritis is present      No lytic or blastic lesions are seen      Soft tissues are unremarkable      IMPRESSION:     No acute osseous abnormality             Elevated troponin level  Assessment & Plan  · Likely secondary to acute infection  · Check a transthoracic echocardiogram  · Outpatient Cardiology evaluation    Skin breakdown  Assessment & Plan  · The patient was evaluated by the Wound Care nurse with the following impressions/recommendations:  Wound Care     Bilateral lower legs: wash legs with soap and water and a towel avoiding wounds to remove dead skin build up  Irrigate wounds with normal saline and pat dry  Apply thick layer of Silvasorb gel directly to wound base or apply to gauze then place directly on wound  Cover with 4x4 gauze and an ABD then secure with Kerlix and paper tape  Change once daily or as needed for strike through drainage       Right medial thigh: Apply skin prep to periwound and allow to dry  Loosely tuck 1/4" Iodoform packing strip into wound and use a small piece of tape to secure tail to skin   Cover with Allevyn non bordered foam and change daily or as needed for strike through drainage       Right posterior knee: Wash with soap and water then pat dry  Apply Maxorb Ag Rope to to inner aspect of knee, Change daily or as needed for strikethrough drainage       Right Lower Abdomen: Irrigate with normal saline then pat dry  Line underside of pannus with Maxorb Ag Rope and change daily       Wash skin folds with soap and water then pat dry  Dust skin folds underneath breasts, bilateral groin and torso with Nystatin powder three times daily       Apply Lac-Hydrin (ammonium lactate) lotion to bilateral legs avoiding wound base one to two times a day      Skin care Plan:  1-Protect sacrum w/Allevyn foam, luis enrique with P, change q3d and PRN, check skin q-shift  2-Turn/reposition q2h or when medically stable for pressure re-distribution on skin   3-Elevate heels to offload pressure]  4-Moisturize skin daily with skin nourishing cream  5-Ehob cushion in chair when out of bed  6-Hydraguard to bilateral heels BID and PRN        Morbid obesity with BMI of 70 and over, adult [facility])  Assessment & Plan  · Lifestyle modifications are recommended including weight loss, improving her diet, and increasing her amount of activity  · She will need an outpatient sleep study completed as soon as possible to check for obstructive sleep apnea  · Outpatient Bariatric Surgery evaluation    Transaminitis  Assessment & Plan  · Improved  · Avoid all hepatotoxic agents  · Follow the LFT's (liver function tests)    Results for [facility] (MRN [id number]) as of 1/2/2020 14:45   Ref   Range 1/1/2020 06:22   HEPATITIS A IGM ANTIBODY Latest Ref Range: Non-reactive, Equivocal-Suggest Recollect  Non-reactive   HEPATITIS B SURFACE ANTIGEN Latest Ref Range: Non-reactive, NonReactive - Confirmed  Non-reactive   HEPATITIS B CORE IGM ANTIBODY Latest Ref Range: Non-reactive  Non-reactive   HEPATITIS C ANTIBODY Latest Ref Range: Non-reactive  Non-reactive       Lymphedema  Assessment & Plan  · Chronic lymphedema of the bilateral lower extremities  · Outpatient lymphedema therapy      VTE Pharmacologic Prophylaxis:   Pharmacologic: Enoxaparin (Lovenox) 60 mg SQ every 12 hours (Dose based on her BMI)  Mechanical VTE Prophylaxis in Place: Yes    Patient Centered Rounds: I have performed bedside rounds with nursing staff today  Time Spent for Care: 30 minutes  More than 50% of total time spent on counseling and coordination of care as described above  Current Length of Stay: 5 day(s)    Current Patient Status: Inpatient   Certification Statement: The patient will continue to require additional inpatient hospital stay due to the need for IV antibiotic treatment and for short-term rehabilitation placement upon discharge  Code Status: Level 1 - Full Code      Subjective: The patient was seen and examined  The patient continues to experience bilateral lower extremity pain  Objective:     Vitals:   Temp (24hrs), Av 6 °F (37 °C), Min:97 9 °F (36 6 °C), Max:99 2 °F (37 3 °C)    Temp:  [97 9 °F (36 6 °C)-99 2 °F (37 3 °C)] 98 7 °F (37 1 °C)  HR:  [73-87] 79  Resp:  [15-18] 18  BP: (136-160)/(58-86) 160/86  SpO2:  [91 %-96 %] 96 %  Body mass index is 75 57 kg/m²  Input and Output Summary (last 24 hours):        Intake/Output Summary (Last 24 hours) at 2020 1453  Last data filed at 2020 0901  Gross per 24 hour   Intake    Output 1200 ml   Net -1200 ml       Physical Exam:     Physical Exam  General:  NAD, awake, alert, follows commands  HEENT:  NC/AT, mucous membranes moist  Neck:  Supple, No JVP elevation  CV:  + S1, + S2, RRR  Pulm:  Lung fields are CTA bilaterally  Abd:  Soft, Non-tender, Non-distended  Ext:  No clubbing/cyanosis, Edema of the bilateral lower extremities  Skin:  Erythema of the right anterior shin, Venous stasis dermatitis of the bilateral anterior shins      Additional Data:    Labs:    Results from last 7 days   Lab Units 20  0505   WBC Thousand/uL 6 40   HEMOGLOBIN g/dL 10 3* HEMATOCRIT % 39 1   PLATELETS Thousands/uL 298   NEUTROS PCT % 68   LYMPHS PCT % 11*   MONOS PCT % 10   EOS PCT % 10*     Results from last 7 days   Lab Units 01/02/20  0505   SODIUM mmol/L 142   POTASSIUM mmol/L 4 0   CHLORIDE mmol/L 104   CO2 mmol/L 39*   BUN mg/dL 11   CREATININE mg/dL 0 99   ANION GAP mmol/L -1*   CALCIUM mg/dL 7 9*   ALBUMIN g/dL 2 0*   TOTAL BILIRUBIN mg/dL 0 61   ALK PHOS U/L 74   ALT U/L 41   AST U/L 26   GLUCOSE RANDOM mg/dL 93     Results from last 7 days   Lab Units 12/28/19  1250   INR  1 31*             Results from last 7 days   Lab Units 01/02/20  0505 01/01/20  0623 01/01/20  0622 12/28/19  1250   LACTIC ACID mmol/L  --   --  0 9 1 8   PROCALCITONIN ng/ml 0 11 0 11  --   --            * I Have Reviewed All Lab Data Listed Above  * Additional Pertinent Lab Tests Reviewed: [id number] 66 Admission Reviewed      Recent Cultures (last 7 days):     Results from last 7 days   Lab Units 12/28/19 2039 12/28/19  1250   BLOOD CULTURE   --  No Growth After 4 Days  No Growth After 4 Days  URINE CULTURE  30,000-39,000 cfu/ml   --        Last 24 Hours Medication List:     Current Facility-Administered Medications:  albuterol 2 5 mg Nebulization Q4H PRN [employer], DO    cholecalciferol 1,000 Units Oral Daily [employer], DO    enoxaparin 60 mg Subcutaneous Q12H [facility] H[facility] Val[facility],     FLUoxetine 20 mg Oral Daily [employer], DO    levothyroxine 125 mcg Oral Early Morning [employer], DO    nystatin  Topical BID Jerson Joyner MD    ondansetron 4 mg Intravenous Q8H PRN RICK Barens    piperacillin-tazobactam 3 375 g Intravenous Q6H Jerson Joyner MD Last Rate: 3 375 g (01/02/20 0609)   vancomycin 12 5 mg/kg (Adjusted) Intravenous Q12H Jerson Joyner MD Last Rate: 1,250 mg (01/02/20 1100)        Today, Patient Was Seen By: Marcial Teixeira DO    ** Please Note: Dictation voice to text software may have been used in the creation of this document  **

## 2020-01-02 NOTE — ASSESSMENT & PLAN NOTE
· Secondary to fall during the hospitalization    XR foot 2 vw left   Status: Final result   PACS Images      Show images for XR foot 2 vw left   Study Result     LEFT FOOT     INDICATION:   left foot pain, trauma, can be done portable      COMPARISON:  None     VIEWS:  XR FOOT 2 VW LEFT         FINDINGS:     There is no acute fracture or dislocation      No significant degenerative changes      No lytic or blastic lesions seen      Soft tissues are unremarkable      IMPRESSION:     No acute osseous abnormality         XR tibia fibula 2 vw left   Status: Final result   PACS Images      Show images for XR tibia fibula 2 vw left   Study Result     LEFT TIBIA AND FIBULA     INDICATION:   left ankle pain, can be done portable      COMPARISON:  None     VIEWS:  XR TIBIA FIBULA 2 VW LEFT         FINDINGS:     There is no acute fracture or dislocation      Advanced knee joint osteoarthritis is present      No lytic or blastic lesions are seen      Soft tissues are unremarkable      IMPRESSION:     No acute osseous abnormality

## 2020-01-02 NOTE — ASSESSMENT & PLAN NOTE
· Continue IV zosyn and IV vancomycin for now  · IV vancomycin dosing per pharmacy with an elevated vancomycin trough level  · Follow culture results  · PT/OT    MRSA culture [[id number]] (Normal) Collected: 12/31/19 1925   Lab Status: Final result Specimen: Nares from Nose Updated: 01/02/20 1241    MRSA Culture Only No Methicillin Resistant Staphlyococcus aureus (MRSA) isolated

## 2020-01-03 ENCOUNTER — APPOINTMENT (INPATIENT)
Dept: NON INVASIVE DIAGNOSTICS | Facility: HOSPITAL | Age: 60
DRG: 602 | End: 2020-01-03
Payer: MEDICARE

## 2020-01-03 PROBLEM — E83.39 HYPOPHOSPHATEMIA: Status: ACTIVE | Noted: 2020-01-03

## 2020-01-03 PROBLEM — R94.31 PROLONGED QT INTERVAL: Status: ACTIVE | Noted: 2020-01-03

## 2020-01-03 LAB
ALBUMIN SERPL BCP-MCNC: 1.7 G/DL (ref 3.5–5)
ALP SERPL-CCNC: 62 U/L (ref 46–116)
ALT SERPL W P-5'-P-CCNC: 29 U/L (ref 12–78)
ANION GAP SERPL CALCULATED.3IONS-SCNC: 1 MMOL/L (ref 4–13)
AST SERPL W P-5'-P-CCNC: 20 U/L (ref 5–45)
BASOPHILS # BLD AUTO: 0.04 THOUSANDS/ΜL (ref 0–0.1)
BASOPHILS NFR BLD AUTO: 1 % (ref 0–1)
BILIRUB SERPL-MCNC: 0.48 MG/DL (ref 0.2–1)
BUN SERPL-MCNC: 10 MG/DL (ref 5–25)
CALCIUM SERPL-MCNC: 7.6 MG/DL (ref 8.3–10.1)
CHLORIDE SERPL-SCNC: 104 MMOL/L (ref 100–108)
CO2 SERPL-SCNC: 36 MMOL/L (ref 21–32)
CREAT SERPL-MCNC: 0.92 MG/DL (ref 0.6–1.3)
EOSINOPHIL # BLD AUTO: 0.66 THOUSAND/ΜL (ref 0–0.61)
EOSINOPHIL NFR BLD AUTO: 11 % (ref 0–6)
ERYTHROCYTE [DISTWIDTH] IN BLOOD BY AUTOMATED COUNT: 15.6 % (ref 11.6–15.1)
GFR SERPL CREATININE-BSD FRML MDRD: 68 ML/MIN/1.73SQ M
GLUCOSE SERPL-MCNC: 85 MG/DL (ref 65–140)
HCT VFR BLD AUTO: 34.9 % (ref 34.8–46.1)
HGB BLD-MCNC: 9.3 G/DL (ref 11.5–15.4)
IMM GRANULOCYTES # BLD AUTO: 0.02 THOUSAND/UL (ref 0–0.2)
IMM GRANULOCYTES NFR BLD AUTO: 0 % (ref 0–2)
LYMPHOCYTES # BLD AUTO: 0.73 THOUSANDS/ΜL (ref 0.6–4.47)
LYMPHOCYTES NFR BLD AUTO: 12 % (ref 14–44)
MAGNESIUM SERPL-MCNC: 2 MG/DL (ref 1.6–2.6)
MCH RBC QN AUTO: 23.7 PG (ref 26.8–34.3)
MCHC RBC AUTO-ENTMCNC: 26.6 G/DL (ref 31.4–37.4)
MCV RBC AUTO: 89 FL (ref 82–98)
MONOCYTES # BLD AUTO: 0.63 THOUSAND/ΜL (ref 0.17–1.22)
MONOCYTES NFR BLD AUTO: 11 % (ref 4–12)
NEUTROPHILS # BLD AUTO: 3.9 THOUSANDS/ΜL (ref 1.85–7.62)
NEUTS SEG NFR BLD AUTO: 65 % (ref 43–75)
NRBC BLD AUTO-RTO: 0 /100 WBCS
PHOSPHATE SERPL-MCNC: 2.6 MG/DL (ref 2.7–4.5)
PLATELET # BLD AUTO: 264 THOUSANDS/UL (ref 149–390)
PMV BLD AUTO: 9.8 FL (ref 8.9–12.7)
POTASSIUM SERPL-SCNC: 3.7 MMOL/L (ref 3.5–5.3)
PROCALCITONIN SERPL-MCNC: 0.11 NG/ML
PROT SERPL-MCNC: 6.1 G/DL (ref 6.4–8.2)
RBC # BLD AUTO: 3.92 MILLION/UL (ref 3.81–5.12)
SODIUM SERPL-SCNC: 141 MMOL/L (ref 136–145)
WBC # BLD AUTO: 5.98 THOUSAND/UL (ref 4.31–10.16)

## 2020-01-03 PROCEDURE — 80053 COMPREHEN METABOLIC PANEL: CPT | Performed by: INTERNAL MEDICINE

## 2020-01-03 PROCEDURE — 93306 TTE W/DOPPLER COMPLETE: CPT

## 2020-01-03 PROCEDURE — 94760 N-INVAS EAR/PLS OXIMETRY 1: CPT

## 2020-01-03 PROCEDURE — 84145 PROCALCITONIN (PCT): CPT | Performed by: INTERNAL MEDICINE

## 2020-01-03 PROCEDURE — 94660 CPAP INITIATION&MGMT: CPT

## 2020-01-03 PROCEDURE — 84100 ASSAY OF PHOSPHORUS: CPT | Performed by: INTERNAL MEDICINE

## 2020-01-03 PROCEDURE — 83735 ASSAY OF MAGNESIUM: CPT | Performed by: INTERNAL MEDICINE

## 2020-01-03 PROCEDURE — 85025 COMPLETE CBC W/AUTO DIFF WBC: CPT | Performed by: INTERNAL MEDICINE

## 2020-01-03 PROCEDURE — 99232 SBSQ HOSP IP/OBS MODERATE 35: CPT | Performed by: INTERNAL MEDICINE

## 2020-01-03 PROCEDURE — 97530 THERAPEUTIC ACTIVITIES: CPT

## 2020-01-03 RX ADMIN — NYSTATIN: 100000 POWDER TOPICAL at 18:52

## 2020-01-03 RX ADMIN — Medication 3.38 G: at 04:50

## 2020-01-03 RX ADMIN — ENOXAPARIN SODIUM 60 MG: 60 INJECTION SUBCUTANEOUS at 09:31

## 2020-01-03 RX ADMIN — MELATONIN 1000 UNITS: at 09:31

## 2020-01-03 RX ADMIN — NYSTATIN: 100000 POWDER TOPICAL at 09:30

## 2020-01-03 RX ADMIN — PERFLUTREN 1 ML/MIN: 6.52 INJECTION, SUSPENSION INTRAVENOUS at 09:26

## 2020-01-03 RX ADMIN — Medication: at 17:29

## 2020-01-03 RX ADMIN — FLUOXETINE 20 MG: 20 CAPSULE ORAL at 09:31

## 2020-01-03 RX ADMIN — Medication 2 TABLET: at 13:50

## 2020-01-03 RX ADMIN — Medication: at 09:30

## 2020-01-03 RX ADMIN — ENOXAPARIN SODIUM 60 MG: 60 INJECTION SUBCUTANEOUS at 20:14

## 2020-01-03 RX ADMIN — LEVOTHYROXINE SODIUM 125 MCG: 125 TABLET ORAL at 05:43

## 2020-01-03 NOTE — SPEECH THERAPY NOTE
Speech Language/Pathology    Bedside Dysphagia Screen:    Chart review completed and discussion with RN prior to conversing with pt  Positioning with intake primary concern/barrier as does not exceed 30 degrees with encouragement  Extensive education provided to pt on need to achieve at least 50 degrees with optimal goal of nearing 90 to aid in swallow function  Pt denying difficulty tolerating regular foods and thin liquids  RN reported tolerance of medication whole with liquid wash   Pt reported xerostomia and education provided on swish and swallow mouthwash, etc  Also communicated to RN and MD aMnish Watts CCC-SLP

## 2020-01-03 NOTE — SOCIAL WORK
CM had discussion with pt regarding LTAC options, team feels with pts PT/OT, wound, respiratory and bariatric needs, pt would be a good candidate for LTAC  As per pt request, referral have been placed to [facility] in [place] and [facility] in [facility]    CM to follow

## 2020-01-03 NOTE — PROGRESS NOTES
Progress Note - Gerard Ghosh 1960, 61 y o  female MRN: [id number]    Unit/Bed#: -01 Encounter: [id number]    Primary Care Provider: Cristela Alan DO   Date and time admitted to hospital: 12/28/2019 12:14 PM        * Cellulitis of right lower extremity  Assessment & Plan  · Continue IV vancomycin for now  · Discontinue IV zosyn  · Follow culture results  · PT/OT    MRSA culture [[id number]] (Normal) Collected: 12/31/19 1925   Lab Status: Final result Specimen: Nares from Nose Updated: 01/02/20 1241    MRSA Culture Only No Methicillin Resistant Staphlyococcus aureus (MRSA) isolated         Hypoxia  Assessment & Plan  · Probable undiagnosed obesity-hypoventilation syndrome and severe obstructive sleep apnea  · Trial Bipap QHS for a goal oxygen saturation of 90% and above  · Utilize supplemental oxygen via the nasal cannula to maintain oxygen saturation levels at 90% and above  · Check an ABG in the morning on 01/04/2020  · She will need an outpatient sleep study completed as soon as possible to check for obstructive sleep apnea  · Outpatient Bariatric Surgery evaluation    Acute metabolic encephalopathy  Assessment & Plan  · Present on admission  · Likely secondary to acute infection and secondary to hypoxia  · The patient's neurologic status has improved    Ambulatory dysfunction  Assessment & Plan  · The patient was evaluated by physical therapy and occupational therapy during the hospitalization  · The patient is unable to ambulate at this time  · The patient requires short-term rehabilitation placement upon discharge    Prolonged QT interval  Assessment & Plan  · Avoid all QT-prolonging agents  · Follow the QT interval  · Outpatient Cardiology evaluation    Hypophosphatemia  Assessment & Plan  · Replete with PO phosphorus supplementation    Dysphagia  Assessment & Plan  · Consult speech therapy for a dysphagia evaluation  · Aspiration precautions at all times    Anemia  Assessment & Plan  · Check an iron panel, vitamin B12 level, and folate level  · Follow the CBC  · Transfuse for a hemoglobin less than 7 g/dl    Acquired hypothyroidism  Assessment & Plan  · Continue her home dose of PO levothyroxine  Outpatient follow-up with PCP in regards to this matter      Left foot pain  Assessment & Plan  · Secondary to fall during the hospitalization    XR foot 2 vw left   Status: Final result   PACS Images      Show images for XR foot 2 vw left   Study Result     LEFT FOOT     INDICATION:   left foot pain, trauma, can be done portable      COMPARISON:  None     VIEWS:  XR FOOT 2 VW LEFT         FINDINGS:     There is no acute fracture or dislocation      No significant degenerative changes      No lytic or blastic lesions seen      Soft tissues are unremarkable      IMPRESSION:     No acute osseous abnormality         XR tibia fibula 2 vw left   Status: Final result   PACS Images      Show images for XR tibia fibula 2 vw left   Study Result     LEFT TIBIA AND FIBULA     INDICATION:   left ankle pain, can be done portable      COMPARISON:  None     VIEWS:  XR TIBIA FIBULA 2 VW LEFT         FINDINGS:     There is no acute fracture or dislocation      Advanced knee joint osteoarthritis is present      No lytic or blastic lesions are seen      Soft tissues are unremarkable      IMPRESSION:     No acute osseous abnormality             Elevated troponin level  Assessment & Plan  · Likely secondary to acute infection  · Outpatient Cardiology evaluation    Echo complete with contrast if indicated   Status: Final result   PACS Images      Show images for Echo complete with contrast if indicated   Study Result     [place], [place] [address]     Transthoracic Echocardiogram  2D, M-mode, Doppler, and Color Doppler     Study date:  2020     [facility]  MR number: [id number]  Account number: [de-identified]  : 1960  Age: 61 years  Gender: Female  Status: Inpatient  Location: [address] [place] [address] Echo Lab  Height: 63 in  Weight: 426 lb  BP: 134/ 66 mmHg     Indications: MI     Diagnoses: I21 4 - Non-ST elevation (NSTEMI) myocardial infarction     Sonographer:  HAILEY Loja  Primary Physician:  Fred Streeter DO  Referring Physician:  Tessa Breen DO  Group:  [facility]  Interpreting Physician:  Carlin Rai DO     IMPRESSIONS:  Technically difficult study     SUMMARY     LEFT VENTRICLE:  Difficult to assess complete regional wal motion  Mildly abnormal septal motion, more consistant with conduction delay  Size was normal   Systolic function was normal  Ejection fraction was estimated in the range of 50 % to 55 %  This study was inadequate for the evaluation of regional wall motion  Wall thickness was mildly increased      TRICUSPID VALVE:  There was trace regurgitation      HISTORY: PRIOR HISTORY: Elevated troponins, morbid obesity     PROCEDURE: The study was performed in the [address] [place] [address] Echo Lab  This was a routine study  The transthoracic approach was used  The study included complete 2D imaging, M-mode, complete spectral Doppler, and color Doppler  The heart  rate was 87 bpm, at the start of the study  Images were obtained from the parasternal, apical, subcostal, and suprasternal notch acoustic windows  Intravenous contrast ( 1 0 ml of Definity) was administered to opacify the left ventricle  Echocardiographic views were limited due to restricted patient mobility, poor patient compliance, poor acoustic window availability, and decreased penetration  This was a technically difficult study      LEFT VENTRICLE: Difficult to assess complete regional wal motion  Mildly abnormal septal motion, more consistant with conduction delay  Size was normal  Systolic function was normal  Ejection fraction was estimated in the range of 50 % to  55 %   This study was inadequate for the evaluation of regional wall motion  Wall thickness was mildly increased  DOPPLER: Left ventricular diastolic function parameters were normal for the patient's age      RIGHT VENTRICLE: The size was normal  Systolic function was normal      LEFT ATRIUM: Size was normal      RIGHT ATRIUM: Size was normal      MITRAL VALVE: Valve structure was normal  DOPPLER: There was no significant regurgitation      AORTIC VALVE: The valve was probably trileaflet  DOPPLER: There was no significant regurgitation      TRICUSPID VALVE: The valve structure was normal  DOPPLER: There was trace regurgitation      PULMONIC VALVE: DOPPLER: There was no evidence for stenosis  There was no regurgitation      PERICARDIUM: There was no pericardial effusion      AORTA: The root exhibited normal size      SYSTEM MEASUREMENT TABLES     2D  %FS: 29 38 %  AV Diam: 2 58 cm  EDV(Teich): 72 64 ml  EF(Teich): 56 81 %  ESV(Teich): 31 37 ml  IVSd: 1 35 cm  LVEDV MOD A4C: 97 9 ml  LVEF MOD A4C: 65 45 %  LVESV MOD A4C: 33 83 ml  LVIDd: 4 06 cm  LVIDs: 2 87 cm  LVLd A4C: 8 48 cm  LVLs A4C: 6 48 cm  LVPWd: 1 06 cm  RWT: 0 52  SV MOD A4C: 64 08 ml  SV(Teich): 41 27 ml     PW  E': 0 14 m/s  E/E': 7 27  MV A Philip: 0 93 m/s  MV Dec Divide: 11 15 m/s2  MV DecT: 92 15 ms  MV E Philip: 1 03 m/s  MV E/A Ratio: 1 11     [facility] Accredited Echocardiography Laboratory     Prepared and electronically signed by  Rosalina Duvall DO  Signed 03-Jan-2020 10:27:55            Skin breakdown  Assessment & Plan  · The patient was evaluated by the Wound Care nurse with the following impressions/recommendations:  Wound Care     Bilateral lower legs: wash legs with soap and water and a towel avoiding wounds to remove dead skin build up  Irrigate wounds with normal saline and pat dry  Apply thick layer of Silvasorb gel directly to wound base or apply to gauze then place directly on wound  Cover with 4x4 gauze and an ABD then secure with Kerlix and paper tape   Change once daily or as needed for strike through drainage       Right medial thigh: Apply skin prep to periwound and allow to dry  Loosely tuck 1/4" Iodoform packing strip into wound and use a small piece of tape to secure tail to skin  Cover with Allevyn non bordered foam and change daily or as needed for strike through drainage       Right posterior knee: Wash with soap and water then pat dry  Apply Maxorb Ag Rope to to inner aspect of knee, Change daily or as needed for strikethrough drainage       Right Lower Abdomen: Irrigate with normal saline then pat dry  Line underside of pannus with Maxorb Ag Rope and change daily       Wash skin folds with soap and water then pat dry  Dust skin folds underneath breasts, bilateral groin and torso with Nystatin powder three times daily       Apply Lac-Hydrin (ammonium lactate) lotion to bilateral legs avoiding wound base one to two times a day      Skin care Plan:  1-Protect sacrum w/Allevyn foam, luis enrique with P, change q3d and PRN, check skin q-shift  2-Turn/reposition q2h or when medically stable for pressure re-distribution on skin   3-Elevate heels to offload pressure]  4-Moisturize skin daily with skin nourishing cream  5-Ehob cushion in chair when out of bed  6-Hydraguard to bilateral heels BID and PRN        Morbid obesity with BMI of 70 and over, adult [facility])  Assessment & Plan  · Lifestyle modifications are recommended including weight loss, improving her diet, and increasing her amount of activity  · She will need an outpatient sleep study completed as soon as possible to check for obstructive sleep apnea  · Outpatient Bariatric Surgery evaluation    Transaminitis  Assessment & Plan  · Improved  · Avoid all hepatotoxic agents  · Follow the LFT's (liver function tests)    Results for [facility] (MRN [id number]) as of 1/2/2020 14:45   Ref   Range 1/1/2020 06:22   HEPATITIS A IGM ANTIBODY Latest Ref Range: Non-reactive, Equivocal-Suggest Recollect  Non-reactive   HEPATITIS B SURFACE ANTIGEN Latest Ref Range: Non-reactive, NonReactive - Confirmed  Non-reactive   HEPATITIS B CORE IGM ANTIBODY Latest Ref Range: Non-reactive  Non-reactive   HEPATITIS C ANTIBODY Latest Ref Range: Non-reactive  Non-reactive       Lymphedema  Assessment & Plan  · Chronic lymphedema of the bilateral lower extremities  · Outpatient lymphedema therapy    VTE Pharmacologic Prophylaxis:   Pharmacologic: Enoxaparin (Lovenox) 60 mg SQ every 12 hours (Dose based on her BMI)  Mechanical VTE Prophylaxis in Place: No with cellulitis of the right lower extremity and venous stasis dermatitis of the bilateral lower extremities    Patient Centered Rounds: I have performed bedside rounds with nursing staff today  Education and Discussions with Family / Patient: I updated the patient's  and son on the patient's current status  Time Spent for Care: 30 minutes  More than 50% of total time spent on counseling and coordination of care as described above  Current Length of Stay: 6 day(s)    Current Patient Status: Inpatient   Certification Statement: The patient will continue to require additional inpatient hospital stay due to the need for IV antibiotic treatment and the need for treatment of the hypoxia  Code Status: Level 1 - Full Code      Subjective: The patient was seen and examined  The patient complains of severe, bilateral lower extremity pain  Objective:     Vitals:   Temp (24hrs), Av 4 °F (36 9 °C), Min:97 9 °F (36 6 °C), Max:99 °F (37 2 °C)    Temp:  [97 9 °F (36 6 °C)-99 °F (37 2 °C)] 98 2 °F (36 8 °C)  HR:  [75-93] 79  Resp:  [16-20] 16  BP: (129-134)/(64-66) 134/66  SpO2:  [96 %-99 %] 99 %  Body mass index is 75 57 kg/m²  Input and Output Summary (last 24 hours):        Intake/Output Summary (Last 24 hours) at 1/3/2020 1344  Last data filed at 1/3/2020 0517  Gross per 24 hour   Intake 780 ml   Output    Net 780 ml       Physical Exam:     Physical Exam  General:  NAD, awake, alert, follows commands  HEENT:  NC/AT, mucous membranes moist  Neck:  Supple, No JVP elevation  CV:  + S1, + S2, RRR  Pulm:  Lung fields are CTA bilaterally  Abd:  Soft, Non-tender, Non-distended  Ext:  No clubbing/cyanosis, Edema of the bilateral lower extremities  Skin:  Erythema of the right anterior shin, Venous stasis dermatitis of the bilateral anterior shins      Additional Data:    Labs:    Results from last 7 days   Lab Units 01/03/20  0513   WBC Thousand/uL 5 98   HEMOGLOBIN g/dL 9 3*   HEMATOCRIT % 34 9   PLATELETS Thousands/uL 264   NEUTROS PCT % 65   LYMPHS PCT % 12*   MONOS PCT % 11   EOS PCT % 11*     Results from last 7 days   Lab Units 01/03/20  0513   SODIUM mmol/L 141   POTASSIUM mmol/L 3 7   CHLORIDE mmol/L 104   CO2 mmol/L 36*   BUN mg/dL 10   CREATININE mg/dL 0 92   ANION GAP mmol/L 1*   CALCIUM mg/dL 7 6*   ALBUMIN g/dL 1 7*   TOTAL BILIRUBIN mg/dL 0 48   ALK PHOS U/L 62   ALT U/L 29   AST U/L 20   GLUCOSE RANDOM mg/dL 85     Results from last 7 days   Lab Units 12/28/19  1250   INR  1 31*             Results from last 7 days   Lab Units 01/03/20  0513 01/02/20  0505 01/01/20  0623 01/01/20  0622 12/28/19  1250   LACTIC ACID mmol/L  --   --   --  0 9 1 8   PROCALCITONIN ng/ml 0 11 0 11 0 11  --   --            * I Have Reviewed All Lab Data Listed Above  * Additional Pertinent Lab Tests Reviewed: [facility]      Recent Cultures (last 7 days):     Results from last 7 days   Lab Units 12/28/19  2039 12/28/19  1250   BLOOD CULTURE   --  No Growth After 5 Days  No Growth After 5 Days     URINE CULTURE  30,000-39,000 cfu/ml   --        Last 24 Hours Medication List:     Current Facility-Administered Medications:  albuterol 2 5 mg Nebulization Q4H PRN Leeanna Goltz, DO    ammonium lactate  Topical BID Leeanna Goltz, DO    cholecalciferol 1,000 Units Oral Daily Leeanna Goltz, DO    enoxaparin 60 mg Subcutaneous Q12H [facility] Hilario Valdes, DO    FLUoxetine 20 mg Oral Daily Jacklyn Hughes, DO    levothyroxine 125 mcg Oral Early Morning Jacklyn Hughes DO    nystatin  Topical BID Danne Mcburney, MD    ondansetron 4 mg Intravenous Q8H PRN Vanessa S Valeri, CRNP    potassium-sodium phosphateS 2 tablet Oral Once Jacklyn Hughes DO    vancomycin 12 5 mg/kg (Adjusted) Intravenous Q12H Danne Mcburney, MD Last Rate: Stopped (01/03/20 0107)        Today, Patient Was Seen By: Jacklyn Hughes DO    ** Please Note: Dictation voice to text software may have been used in the creation of this document   **

## 2020-01-03 NOTE — ASSESSMENT & PLAN NOTE
· Likely secondary to acute infection  · Outpatient Cardiology evaluation    Echo complete with contrast if indicated   Status: Final result   PACS Images      Show images for Echo complete with contrast if indicated   Study Result     [address]  [facility], [address] [place] [address]     Transthoracic Echocardiogram  2D, M-mode, Doppler, and Color Doppler     Study date:  2020     Patient: Polo Lynch  MR number: [id number]  Account number: [de-identified]  : 1960  Age: 61 years  Gender: Female  Status: Inpatient  Location: [facility] Echo Lab  Height: 63 in  Weight: 426 lb  BP: 134/ 66 mmHg     Indications: MI     Diagnoses: I21 4 - Non-ST elevation (NSTEMI) myocardial infarction     Sonographer:  HAILEY Lopez  Primary Physician:  Krystal Madison DO  Referring Physician:  Medardo Bustillos DO  Group:  [facility]  Interpreting Physician:  Tonia Pathak DO     IMPRESSIONS:  Technically difficult study     SUMMARY     LEFT VENTRICLE:  Difficult to assess complete regional wal motion  Mildly abnormal septal motion, more consistant with conduction delay  Size was normal   Systolic function was normal  Ejection fraction was estimated in the range of 50 % to 55 %  This study was inadequate for the evaluation of regional wall motion  Wall thickness was mildly increased      TRICUSPID VALVE:  There was trace regurgitation      HISTORY: PRIOR HISTORY: Elevated troponins, morbid obesity     PROCEDURE: The study was performed in the [facility] Echo Lab  This was a routine study  The transthoracic approach was used  The study included complete 2D imaging, M-mode, complete spectral Doppler, and color Doppler  The heart  rate was 87 bpm, at the start of the study  Images were obtained from the parasternal, apical, subcostal, and suprasternal notch acoustic windows   Intravenous contrast ( 1 0 ml of Definity) was administered to opacify the left ventricle  Echocardiographic views were limited due to restricted patient mobility, poor patient compliance, poor acoustic window availability, and decreased penetration  This was a technically difficult study      LEFT VENTRICLE: Difficult to assess complete regional wal motion  Mildly abnormal septal motion, more consistant with conduction delay  Size was normal  Systolic function was normal  Ejection fraction was estimated in the range of 50 % to  55 %  This study was inadequate for the evaluation of regional wall motion  Wall thickness was mildly increased  DOPPLER: Left ventricular diastolic function parameters were normal for the patient's age      RIGHT VENTRICLE: The size was normal  Systolic function was normal      LEFT ATRIUM: Size was normal      RIGHT ATRIUM: Size was normal      MITRAL VALVE: Valve structure was normal  DOPPLER: There was no significant regurgitation      AORTIC VALVE: The valve was probably trileaflet  DOPPLER: There was no significant regurgitation      TRICUSPID VALVE: The valve structure was normal  DOPPLER: There was trace regurgitation      PULMONIC VALVE: DOPPLER: There was no evidence for stenosis  There was no regurgitation      PERICARDIUM: There was no pericardial effusion      AORTA: The root exhibited normal size      SYSTEM MEASUREMENT TABLES     2D  %FS: 29 38 %  AV Diam: 2 58 cm  EDV(Teich): 72 64 ml  EF(Teich): 56 81 %  ESV(Teich): 31 37 ml  IVSd: 1 35 cm  LVEDV MOD A4C: 97 9 ml  LVEF MOD A4C: 65 45 %  LVESV MOD A4C: 33 83 ml  LVIDd: 4 06 cm  LVIDs: 2 87 cm  LVLd A4C: 8 48 cm  LVLs A4C: 6 48 cm  LVPWd: 1 06 cm  RWT: 0 52  SV MOD A4C: 64 08 ml  SV(Teich): 41 27 ml     PW  E': 0 14 m/s  E/E': 7 27  MV A Philip: 0 93 m/s  MV Dec Perry: 11 15 m/s2  MV DecT: 92 15 ms  MV E Philip: 1 03 m/s  MV E/A Ratio: 1 11     [employer] Accredited Echocardiography Laboratory     Prepared and electronically signed by  Tad Christianson DO  Signed 03-Jan-2020 10:27:55

## 2020-01-03 NOTE — PHYSICAL THERAPY NOTE
PHYSICAL THERAPY NOTE [id number] am  ATTENDED PT [facility] W/ MD AND RN PRESENT; PT AND 2 SONS AT BEDSIDE TO DISCUSS YESTERDAYS' PT OT [facility] REHAB ON D/C  MD ( DR PEREZ) AND PT REVIEWING MEDICAL NEEDS ; WOUND AND REHAB NEEDS TO ENSURE SAFE D/C HOME AS CURRENTLY PT IS REQUIRING AX2-3 FOR MOBILIZING SELF TO EOB AND FOR ATTEMPTS TO SCOOT AND LIFT BUTTOCKS FOR MOBILITY PROGRESSION  PATIENT REMAINS SEVERELY LIMITED BY PAIN; EDEMA; WOUNDS; INCONTINENCE; BODY HABITUS; GROSS DECONDITIONING; ANXIETY STRENGTH AND AROM DEFICITS  PT IS RELUCTANT TO D/C TO SKILLED FACILITY STATING THAT SHE WANTS TO GO HOME AND BE W/ HER PETS AND "WILL BE FINE" IF WE JUST SEND HER HOME  PT AND SONS TO DISCUSS OPTIONS AND SEEM MORE OPEN BY CONCLUSION OF BRIEF MEETING  PT ENCOURAGED TO COMPLETE LE THEREX W/ BED IN CHAIR POSITION AS INSTRUCTED DURING SESSION YESTERDAY- AGREEABLE W/ PLAN AND DOES NOT VERBALIZE QUESTION W/ SAME  WILL CONT W/ PT POC 3-5 X WEEK AS STATED IN POC   WILL ATTEMPT PM, SESSION AS TIME ALLOWS          Patient Name: Anton Carson  KFNQH'O Date: 1/3/2020

## 2020-01-03 NOTE — ASSESSMENT & PLAN NOTE
· Continue IV vancomycin for now  · Discontinue IV zosyn  · Follow culture results  · PT/OT    MRSA culture [[id number]] (Normal) Collected: 12/31/19 1925   Lab Status: Final result Specimen: Nares from Nose Updated: 01/02/20 1241    MRSA Culture Only No Methicillin Resistant Staphlyococcus aureus (MRSA) isolated

## 2020-01-03 NOTE — ASSESSMENT & PLAN NOTE
· The patient was evaluated by physical therapy and occupational therapy during the hospitalization  · The patient is unable to ambulate at this time  · The patient requires short-term rehabilitation placement upon discharge

## 2020-01-03 NOTE — ASSESSMENT & PLAN NOTE
· Present on admission  · Likely secondary to acute infection and secondary to hypoxia  · The patient's neurologic status has improved

## 2020-01-03 NOTE — PROGRESS NOTES
Vancomycin IV Pharmacy-to-Dose Consultation    Anton Carson is a 61 y o  female who is currently receiving Vancomycin IV with management by the Pharmacy Consult service  Assessment/Plan:  The patient was reviewed  Renal function is stable and no signs or symptoms of nephrotoxicity and/or infusion reactions were documented in the chart  Based on todays assessment, continue current vancomycin (day # 7) dosing of 1250 mg IV every 12 hrs, with a plan for trough to be drawn at 1330 on 1/4/20 due to dose time change today  We will continue to follow the patients culture results and clinical progress daily      Kendra Dennis, Pharmacist

## 2020-01-03 NOTE — ASSESSMENT & PLAN NOTE
· Improved  · Avoid all hepatotoxic agents  · Follow the LFT's (liver function tests)    Results for [facility] (MRN [id number]) as of 1/2/2020 14:45   Ref   Range 1/1/2020 06:22   HEPATITIS A IGM ANTIBODY Latest Ref Range: Non-reactive, Equivocal-Suggest Recollect  Non-reactive   HEPATITIS B SURFACE ANTIGEN Latest Ref Range: Non-reactive, NonReactive - Confirmed  Non-reactive   HEPATITIS B CORE IGM ANTIBODY Latest Ref Range: Non-reactive  Non-reactive   HEPATITIS C ANTIBODY Latest Ref Range: Non-reactive  Non-reactive

## 2020-01-03 NOTE — ASSESSMENT & PLAN NOTE
· Probable undiagnosed obesity-hypoventilation syndrome and severe obstructive sleep apnea  · Trial Bipap QHS for a goal oxygen saturation of 90% and above  · Utilize supplemental oxygen via the nasal cannula to maintain oxygen saturation levels at 90% and above  · Check an ABG in the morning on 01/04/2020  · She will need an outpatient sleep study completed as soon as possible to check for obstructive sleep apnea  · Outpatient Bariatric Surgery evaluation

## 2020-01-04 PROBLEM — J96.02 ACUTE RESPIRATORY FAILURE WITH HYPOXIA AND HYPERCAPNIA (HCC): Status: ACTIVE | Noted: 2020-01-02

## 2020-01-04 PROBLEM — J96.01 ACUTE RESPIRATORY FAILURE WITH HYPOXIA AND HYPERCAPNIA (HCC): Status: ACTIVE | Noted: 2020-01-02

## 2020-01-04 LAB
ALBUMIN SERPL BCP-MCNC: 1.8 G/DL (ref 3.5–5)
ALP SERPL-CCNC: 63 U/L (ref 46–116)
ALT SERPL W P-5'-P-CCNC: 24 U/L (ref 12–78)
ANION GAP SERPL CALCULATED.3IONS-SCNC: 0 MMOL/L (ref 4–13)
ARTERIAL PATENCY WRIST A: YES
AST SERPL W P-5'-P-CCNC: 18 U/L (ref 5–45)
BASE EXCESS BLDA CALC-SCNC: 9.6 MMOL/L
BASOPHILS # BLD AUTO: 0.05 THOUSANDS/ΜL (ref 0–0.1)
BASOPHILS NFR BLD AUTO: 1 % (ref 0–1)
BILIRUB SERPL-MCNC: 0.47 MG/DL (ref 0.2–1)
BUN SERPL-MCNC: 8 MG/DL (ref 5–25)
CALCIUM SERPL-MCNC: 7.6 MG/DL (ref 8.3–10.1)
CHLORIDE SERPL-SCNC: 104 MMOL/L (ref 100–108)
CK SERPL-CCNC: 21 U/L (ref 26–192)
CO2 SERPL-SCNC: 37 MMOL/L (ref 21–32)
CREAT SERPL-MCNC: 0.82 MG/DL (ref 0.6–1.3)
EOSINOPHIL # BLD AUTO: 0.67 THOUSAND/ΜL (ref 0–0.61)
EOSINOPHIL NFR BLD AUTO: 14 % (ref 0–6)
ERYTHROCYTE [DISTWIDTH] IN BLOOD BY AUTOMATED COUNT: 15.6 % (ref 11.6–15.1)
FERRITIN SERPL-MCNC: 54 NG/ML (ref 8–388)
FOLATE SERPL-MCNC: 2.4 NG/ML (ref 3.1–17.5)
GFR SERPL CREATININE-BSD FRML MDRD: 79 ML/MIN/1.73SQ M
GLUCOSE SERPL-MCNC: 90 MG/DL (ref 65–140)
HCO3 BLDA-SCNC: 37.4 MMOL/L (ref 22–28)
HCT VFR BLD AUTO: 36.1 % (ref 34.8–46.1)
HGB BLD-MCNC: 9.6 G/DL (ref 11.5–15.4)
IMM GRANULOCYTES # BLD AUTO: 0.02 THOUSAND/UL (ref 0–0.2)
IMM GRANULOCYTES NFR BLD AUTO: 0 % (ref 0–2)
IRON SATN MFR SERPL: 18 %
IRON SERPL-MCNC: 52 UG/DL (ref 50–170)
LYMPHOCYTES # BLD AUTO: 0.83 THOUSANDS/ΜL (ref 0.6–4.47)
LYMPHOCYTES NFR BLD AUTO: 18 % (ref 14–44)
MAGNESIUM SERPL-MCNC: 2 MG/DL (ref 1.6–2.6)
MCH RBC QN AUTO: 23.8 PG (ref 26.8–34.3)
MCHC RBC AUTO-ENTMCNC: 26.6 G/DL (ref 31.4–37.4)
MCV RBC AUTO: 90 FL (ref 82–98)
MONOCYTES # BLD AUTO: 0.58 THOUSAND/ΜL (ref 0.17–1.22)
MONOCYTES NFR BLD AUTO: 12 % (ref 4–12)
NASAL CANNULA: ABNORMAL
NEUTROPHILS # BLD AUTO: 2.53 THOUSANDS/ΜL (ref 1.85–7.62)
NEUTS SEG NFR BLD AUTO: 55 % (ref 43–75)
NRBC BLD AUTO-RTO: 0 /100 WBCS
O2 CT BLDA-SCNC: 14.7 ML/DL (ref 16–23)
OXYHGB MFR BLDA: 94.2 % (ref 94–97)
PCO2 BLDA: 70.3 MM HG (ref 36–44)
PH BLDA: 7.34 [PH] (ref 7.35–7.45)
PHOSPHATE SERPL-MCNC: 2.9 MG/DL (ref 2.7–4.5)
PLATELET # BLD AUTO: 278 THOUSANDS/UL (ref 149–390)
PMV BLD AUTO: 10.3 FL (ref 8.9–12.7)
PO2 BLDA: 81.3 MM HG (ref 75–129)
POTASSIUM SERPL-SCNC: 3.5 MMOL/L (ref 3.5–5.3)
PROT SERPL-MCNC: 6.3 G/DL (ref 6.4–8.2)
RBC # BLD AUTO: 4.03 MILLION/UL (ref 3.81–5.12)
SODIUM SERPL-SCNC: 141 MMOL/L (ref 136–145)
SPECIMEN SOURCE: ABNORMAL
TIBC SERPL-MCNC: 295 UG/DL (ref 250–450)
VANCOMYCIN TROUGH SERPL-MCNC: 21.3 UG/ML (ref 10–20)
VIT B12 SERPL-MCNC: 1050 PG/ML (ref 100–900)
WBC # BLD AUTO: 4.68 THOUSAND/UL (ref 4.31–10.16)

## 2020-01-04 PROCEDURE — 94760 N-INVAS EAR/PLS OXIMETRY 1: CPT

## 2020-01-04 PROCEDURE — 99232 SBSQ HOSP IP/OBS MODERATE 35: CPT | Performed by: INTERNAL MEDICINE

## 2020-01-04 PROCEDURE — 82728 ASSAY OF FERRITIN: CPT | Performed by: INTERNAL MEDICINE

## 2020-01-04 PROCEDURE — 80053 COMPREHEN METABOLIC PANEL: CPT | Performed by: INTERNAL MEDICINE

## 2020-01-04 PROCEDURE — 82550 ASSAY OF CK (CPK): CPT | Performed by: INTERNAL MEDICINE

## 2020-01-04 PROCEDURE — 84100 ASSAY OF PHOSPHORUS: CPT | Performed by: INTERNAL MEDICINE

## 2020-01-04 PROCEDURE — 82746 ASSAY OF FOLIC ACID SERUM: CPT | Performed by: INTERNAL MEDICINE

## 2020-01-04 PROCEDURE — 83550 IRON BINDING TEST: CPT | Performed by: INTERNAL MEDICINE

## 2020-01-04 PROCEDURE — 83540 ASSAY OF IRON: CPT | Performed by: INTERNAL MEDICINE

## 2020-01-04 PROCEDURE — 82607 VITAMIN B-12: CPT | Performed by: INTERNAL MEDICINE

## 2020-01-04 PROCEDURE — 85025 COMPLETE CBC W/AUTO DIFF WBC: CPT | Performed by: INTERNAL MEDICINE

## 2020-01-04 PROCEDURE — 36600 WITHDRAWAL OF ARTERIAL BLOOD: CPT

## 2020-01-04 PROCEDURE — 94660 CPAP INITIATION&MGMT: CPT

## 2020-01-04 PROCEDURE — 83735 ASSAY OF MAGNESIUM: CPT | Performed by: INTERNAL MEDICINE

## 2020-01-04 PROCEDURE — 82805 BLOOD GASES W/O2 SATURATION: CPT | Performed by: INTERNAL MEDICINE

## 2020-01-04 PROCEDURE — 80202 ASSAY OF VANCOMYCIN: CPT | Performed by: INTERNAL MEDICINE

## 2020-01-04 RX ORDER — ACETAMINOPHEN 325 MG/1
650 TABLET ORAL EVERY 6 HOURS PRN
Status: DISCONTINUED | OUTPATIENT
Start: 2020-01-04 | End: 2020-01-11 | Stop reason: HOSPADM

## 2020-01-04 RX ORDER — TRAMADOL HYDROCHLORIDE 50 MG/1
50 TABLET ORAL EVERY 6 HOURS PRN
Status: DISCONTINUED | OUTPATIENT
Start: 2020-01-04 | End: 2020-01-04

## 2020-01-04 RX ORDER — POTASSIUM CHLORIDE 20 MEQ/1
40 TABLET, EXTENDED RELEASE ORAL ONCE
Status: COMPLETED | OUTPATIENT
Start: 2020-01-04 | End: 2020-01-04

## 2020-01-04 RX ADMIN — LEVOTHYROXINE SODIUM 125 MCG: 125 TABLET ORAL at 05:17

## 2020-01-04 RX ADMIN — Medication: at 17:21

## 2020-01-04 RX ADMIN — ACETAMINOPHEN 650 MG: 325 TABLET ORAL at 20:17

## 2020-01-04 RX ADMIN — MELATONIN 1000 UNITS: at 08:26

## 2020-01-04 RX ADMIN — ACETAMINOPHEN 650 MG: 325 TABLET ORAL at 10:21

## 2020-01-04 RX ADMIN — NYSTATIN: 100000 POWDER TOPICAL at 17:21

## 2020-01-04 RX ADMIN — NYSTATIN: 100000 POWDER TOPICAL at 08:27

## 2020-01-04 RX ADMIN — Medication: at 08:27

## 2020-01-04 RX ADMIN — ENOXAPARIN SODIUM 60 MG: 60 INJECTION SUBCUTANEOUS at 08:26

## 2020-01-04 RX ADMIN — FLUOXETINE 20 MG: 20 CAPSULE ORAL at 08:26

## 2020-01-04 RX ADMIN — VANCOMYCIN HYDROCHLORIDE 1250 MG: 1 INJECTION, POWDER, LYOPHILIZED, FOR SOLUTION INTRAVENOUS at 02:23

## 2020-01-04 RX ADMIN — ENOXAPARIN SODIUM 60 MG: 60 INJECTION SUBCUTANEOUS at 20:16

## 2020-01-04 RX ADMIN — POTASSIUM CHLORIDE 40 MEQ: 1500 TABLET, EXTENDED RELEASE ORAL at 14:45

## 2020-01-04 RX ADMIN — VANCOMYCIN HYDROCHLORIDE 2000 MG: 5 INJECTION, POWDER, LYOPHILIZED, FOR SOLUTION INTRAVENOUS at 21:34

## 2020-01-04 NOTE — ASSESSMENT & PLAN NOTE
· Likely secondary to acute infection  · Outpatient Cardiology evaluation    Echo complete with contrast if indicated   Status: Final result   PACS Images      Show images for Echo complete with contrast if indicated   Study Result     [address]  [facility], [address] [place] [address]     Transthoracic Echocardiogram  2D, M-mode, Doppler, and Color Doppler     Study date:  2020     Patient: Lilly Horne  MR number: [id number]  Account number: [de-identified]  : 1960  Age: 61 years  Gender: Female  Status: Inpatient  Location:  Echo Lab  Height: 63 in  Weight: 426 lb  BP: 134/ 66 mmHg     Indications: MI     Diagnoses: I21 4 - Non-ST elevation (NSTEMI) myocardial infarction     Sonographer:  HAILEY Howell  Primary Physician:  Jacob Levy DO  Referring Physician:  Saurabh Huffman DO  Group:  [facility]  Interpreting Physician:  Jackie Richard DO     IMPRESSIONS:  Technically difficult study     SUMMARY     LEFT VENTRICLE:  Difficult to assess complete regional wal motion  Mildly abnormal septal motion, more consistant with conduction delay  Size was normal   Systolic function was normal  Ejection fraction was estimated in the range of 50 % to 55 %  This study was inadequate for the evaluation of regional wall motion  Wall thickness was mildly increased      TRICUSPID VALVE:  There was trace regurgitation      HISTORY: PRIOR HISTORY: Elevated troponins, morbid obesity     PROCEDURE: The study was performed in the  Echo Lab  This was a routine study  The transthoracic approach was used  The study included complete 2D imaging, M-mode, complete spectral Doppler, and color Doppler  The heart  rate was 87 bpm, at the start of the study  Images were obtained from the parasternal, apical, subcostal, and suprasternal notch acoustic windows   Intravenous contrast ( 1 0 ml of Definity) was administered to opacify the left ventricle  Echocardiographic views were limited due to restricted patient mobility, poor patient compliance, poor acoustic window availability, and decreased penetration  This was a technically difficult study      LEFT VENTRICLE: Difficult to assess complete regional wal motion  Mildly abnormal septal motion, more consistant with conduction delay  Size was normal  Systolic function was normal  Ejection fraction was estimated in the range of 50 % to  55 %  This study was inadequate for the evaluation of regional wall motion  Wall thickness was mildly increased  DOPPLER: Left ventricular diastolic function parameters were normal for the patient's age      RIGHT VENTRICLE: The size was normal  Systolic function was normal      LEFT ATRIUM: Size was normal      RIGHT ATRIUM: Size was normal      MITRAL VALVE: Valve structure was normal  DOPPLER: There was no significant regurgitation      AORTIC VALVE: The valve was probably trileaflet  DOPPLER: There was no significant regurgitation      TRICUSPID VALVE: The valve structure was normal  DOPPLER: There was trace regurgitation      PULMONIC VALVE: DOPPLER: There was no evidence for stenosis  There was no regurgitation      PERICARDIUM: There was no pericardial effusion      AORTA: The root exhibited normal size      SYSTEM MEASUREMENT TABLES     2D  %FS: 29 38 %  AV Diam: 2 58 cm  EDV(Teich): 72 64 ml  EF(Teich): 56 81 %  ESV(Teich): 31 37 ml  IVSd: 1 35 cm  LVEDV MOD A4C: 97 9 ml  LVEF MOD A4C: 65 45 %  LVESV MOD A4C: 33 83 ml  LVIDd: 4 06 cm  LVIDs: 2 87 cm  LVLd A4C: 8 48 cm  LVLs A4C: 6 48 cm  LVPWd: 1 06 cm  RWT: 0 52  SV MOD A4C: 64 08 ml  SV(Teich): 41 27 ml     PW  E': 0 14 m/s  E/E': 7 27  MV A Philip: 0 93 m/s  MV Dec Weber: 11 15 m/s2  MV DecT: 92 15 ms  MV E Philip: 1 03 m/s  MV E/A Ratio: 1 11     [employer] Accredited Echocardiography Laboratory     Prepared and electronically signed by  Emmanuel Woodard DO  Signed 03-Jan-2020 10:27:55

## 2020-01-04 NOTE — ASSESSMENT & PLAN NOTE
· Present on admission  · Likely secondary to acute infection and secondary to hypoxia as well as hypercapnia  · The patient's neurologic status has improved

## 2020-01-04 NOTE — PROGRESS NOTES
Vancomycin Assessment    Giles Sandoval is a 61 y o  female who is currently receiving vancomycin 1250 mg iv q 12 hrs for skin-soft tissue infection     Relevant clinical data and objective history reviewed:  Creatinine   Date Value Ref Range Status   01/04/2020 0 82 0 60 - 1 30 mg/dL Final     Comment:     Standardized to IDMS reference method   01/03/2020 0 92 0 60 - 1 30 mg/dL Final     Comment:     Standardized to IDMS reference method   01/02/2020 0 99 0 60 - 1 30 mg/dL Final     Comment:     Standardized to IDMS reference method     /60   Pulse 82   Temp 97 7 °F (36 5 °C)   Resp 18   Ht 5' 3" (1 6 m)   Wt (!) 194 kg (426 lb 9 6 oz)   SpO2 97%   BMI 75 57 kg/m²   I/O last 3 completed shifts: In: 1260 [P O :960; IV Piggyback:300]  Out: 1050 [Urine:1050]  Lab Results   Component Value Date/Time    BUN 8 01/04/2020 04:55 AM    WBC 4 68 01/04/2020 04:55 AM    HGB 9 6 (L) 01/04/2020 04:55 AM    HCT 36 1 01/04/2020 04:55 AM    MCV 90 01/04/2020 04:55 AM     01/04/2020 04:55 AM     Temp Readings from Last 3 Encounters:   01/04/20 97 7 °F (36 5 °C)     Vancomycin Days of Therapy: 8    Assessment/Plan  The patient is currently on vancomycin utilizing scheduled dosing  Baseline risks associated with therapy include: concomitant nephrotoxic medications  The patient is receiving 1250 mg iv q 12 hrs with the most recent vancomycin level being at steady-state and supratherapeutic based on a goal of 15-20 (appropriate for most indications) ; therefore, after clinical evaluation will be changed to 2000 mg iv q 24 hrs   Pharmacy will continue to follow closely for s/sx of nephrotoxicity, infusion reactions and appropriateness of therapy  BMP and CBC will be ordered per protocol  Plan for trough as patient approaches steady state, prior to the 3rd  dose at approximately 2130 on 1/6/20  Pharmacy will continue to follow the patients culture results and clinical progress daily      Wilfredo Both, Pharmacist

## 2020-01-04 NOTE — PLAN OF CARE
Problem: Potential for Falls  Goal: Patient will remain free of falls  Description  INTERVENTIONS:  - Assess patient frequently for physical needs  -  Identify cognitive and physical deficits and behaviors that affect risk of falls    -  [place] fall precautions as indicated by assessment   - Educate patient/family on patient safety including physical limitations  - Instruct patient to call for assistance with activity based on assessment  - Modify environment to reduce risk of injury  - Consider OT/PT consult to assist with strengthening/mobility  Outcome: Progressing     Problem: Prexisting or High Potential for Compromised Skin Integrity  Goal: Skin integrity is maintained or improved  Description  INTERVENTIONS:  - Identify patients at risk for skin breakdown  - Assess and monitor skin integrity  - Assess and monitor nutrition and hydration status  - Monitor labs   - Assess for incontinence   - Turn and reposition patient  - Assist with mobility/ambulation  - Relieve pressure over bony prominences  - Avoid friction and shearing  - Provide appropriate hygiene as needed including keeping skin clean and dry  - Evaluate need for skin moisturizer/barrier cream  - Collaborate with interdisciplinary team   - Patient/family teaching  - Consider wound care consult   Outcome: Progressing     Problem: PAIN - ADULT  Goal: Verbalizes/displays adequate comfort level or baseline comfort level  Description  Interventions:  - Encourage patient to monitor pain and request assistance  - Assess pain using appropriate pain scale  - Administer analgesics based on type and severity of pain and evaluate response  - Implement non-pharmacological measures as appropriate and evaluate response  - Consider cultural and social influences on pain and pain management  - Notify physician/advanced practitioner if interventions unsuccessful or patient reports new pain  Outcome: Progressing     Problem: INFECTION - ADULT  Goal: Absence or prevention of progression during hospitalization  Description  INTERVENTIONS:  - Assess and monitor for signs and symptoms of infection  - Monitor lab/diagnostic results  - Administer medications as ordered  - Instruct and encourage patient and family to use good hand hygiene technique  - Identify and instruct in appropriate isolation precautions for identified infection/condition   Outcome: Progressing     Problem: SAFETY ADULT  Goal: Maintain or return to baseline ADL function  Description  INTERVENTIONS:  -  Assess patient's ability to carry out ADLs; assess patient's baseline for ADL function and identify physical deficits which impact ability to perform ADLs (bathing, care of mouth/teeth, toileting, grooming, dressing, etc )  - Assess/evaluate cause of self-care deficits   - Assess range of motion  - Assess patient's mobility; develop plan if impaired  - Assess patient's need for assistive devices and provide as appropriate  - Encourage maximum independence but intervene and supervise when necessary  - Involve family in performance of ADLs  - Assess for home care needs following discharge   - Consider OT consult to assist with ADL evaluation and planning for discharge  - Provide patient education as appropriate  Outcome: Progressing  Goal: Maintain or return mobility status to optimal level  Description  INTERVENTIONS:  - Assess patient's baseline mobility status (ambulation, transfers, stairs, etc )    - Identify cognitive and physical deficits and behaviors that affect mobility  - Identify mobility aids required to assist with transfers and/or ambulation (gait belt, sit-to-stand, lift, walker, cane, etc )  - [place] fall precautions as indicated by assessment  - Record patient progress and toleration of activity level on Mobility SBAR; progress patient to next Phase/Stage  - Instruct patient to call for assistance with activity based on assessment  - Consider rehabilitation consult to assist with strengthening/weightbearing, etc   Outcome: Progressing     Problem: DISCHARGE PLANNING  Goal: Discharge to home or other facility with appropriate resources  Description  INTERVENTIONS:  - Identify barriers to discharge w/patient and caregiver  - Arrange for needed discharge resources and transportation as appropriate  - Identify discharge learning needs (meds, wound care, etc )  - Arrange for interpretive services to assist at discharge as needed  - Refer to Case Management Department for coordinating discharge planning if the patient needs post-hospital services based on physician/advanced practitioner order or complex needs related to functional status, cognitive ability, or social support system  Outcome: Progressing     Problem: Knowledge Deficit  Goal: Patient/family/caregiver demonstrates understanding of disease process, treatment plan, medications, and discharge instructions  Description  Complete learning assessment and assess knowledge base    Interventions:  - Provide teaching at level of understanding  - Provide teaching via preferred learning methods  Outcome: Progressing

## 2020-01-04 NOTE — ASSESSMENT & PLAN NOTE
· The patient was evaluated by physical therapy and occupational therapy during the hospitalization  · The patient is unable to ambulate at this time  · The patient requires short-term rehabilitation placement upon discharge  · The patient would be an excellent candidate for [facility] placement

## 2020-01-04 NOTE — ASSESSMENT & PLAN NOTE
· Improved  · Avoid all hepatotoxic agents  · Follow the LFT's (liver function tests)    Results for [facility] (MRN [id number]) as of 1/2/2020 14:45   Ref   Range 1/1/2020 06:22   HEPATITIS A IGM ANTIBODY Latest Ref Range: Non-reactive, Equivocal-Suggest Recollect  Non-reactive   HEPATITIS B SURFACE ANTIGEN Latest Ref Range: Non-reactive, NonReactive - Confirmed  Non-reactive   HEPATITIS B CORE IGM ANTIBODY Latest Ref Range: Non-reactive  Non-reactive   HEPATITIS C ANTIBODY Latest Ref Range: Non-reactive  Non-reactive

## 2020-01-04 NOTE — RESPIRATORY THERAPY NOTE
RT Protocol Note  [facility] 61 y o  female MRN: [id number]  Unit/Bed#: -01 Encounter: [id number]    Assessment    Principal Problem:    Cellulitis of right lower extremity  Active Problems:    Lymphedema    Acute metabolic encephalopathy    Transaminitis    Morbid obesity with BMI of 70 and over, adult ([facility] )    Skin breakdown    Elevated troponin level    Left foot pain    Acquired hypothyroidism    Anemia    Hypoxia    Ambulatory dysfunction    Dysphagia    Hypophosphatemia    Prolonged QT interval      Home Pulmonary Medications:    Home Devices/Therapy: BiPAP/CPAP    Past Medical History:   Diagnosis Date    Disease of thyroid gland     Lymphedema     Morbid obesity due to excess calories ([facility] )     Renal disorder      Social History     Socioeconomic History    Marital status: /Civil Union     Spouse name: None    Number of children: None    Years of education: None    Highest education level: None   Occupational History    None   Social Needs    Financial resource strain: None    Food insecurity:     Worry: None     Inability: None    Transportation needs:     Medical: None     Non-medical: None   Tobacco Use    Smoking status: Never Smoker    Smokeless tobacco: Never Used   Substance and Sexual Activity    Alcohol use: Not Currently    Drug use: Never    Sexual activity: Not Currently   Lifestyle    Physical activity:     Days per week: None     Minutes per session: None    Stress: None   Relationships    Social connections:     Talks on phone: None     Gets together: None     Attends [employer] service: None     Active member of club or organization: None     Attends meetings of clubs or organizations: None     Relationship status: None    Intimate partner violence:     Fear of current or ex partner: None     Emotionally abused: None     Physically abused: None     Forced sexual activity: None   Other Topics Concern    None   Social History Narrative    None Subjective    Subjective Data: denies SOB     Objective    Physical Exam:   Assessment Type: Pre-treatment  General Appearance: Awake  Respiratory Pattern: Normal  Chest Assessment: Chest expansion symmetrical  Bilateral Breath Sounds: Diminished, Clear  Cough: None  O2 Device: 3L O2/CPA HS    Vitals:  Blood pressure 124/59, pulse 78, temperature 99 4 °F (37 4 °C), resp  rate 18, height 5' 3" (1 6 m), weight (!) 194 kg (426 lb 9 6 oz), SpO2 98 %  Imaging and other studies: I have personally reviewed pertinent reports        O2 Device: 3L O2/CPA HS     Plan    Respiratory Plan: No distress/Pulmonary history        Resp Comments: pt on cpap HS

## 2020-01-04 NOTE — ASSESSMENT & PLAN NOTE
· Probable undiagnosed obesity-hypoventilation syndrome and severe obstructive sleep apnea  · Trial Bipap QHS for a goal oxygen saturation of 90% and above  · Utilize supplemental oxygen via the nasal cannula to maintain oxygen saturation levels at 90% and above  · She will need an outpatient sleep study completed as soon as possible to check for obstructive sleep apnea  · Outpatient Bariatric Surgery evaluation    Results for [facility] (MRN [id number]) as of 1/4/2020 13:47   Ref   Range 1/4/2020 10:09   pH, Arterial Latest Ref Range: 7 350 - 7 450  7 344 (L)   pCO2, Arterial Latest Ref Range: 36 0 - 44 0 mm Hg 70 3 (HH)   pO2, Arterial Latest Ref Range: 75 0 - 129 0 mm Hg 81 3   HCO3, Arterial Latest Ref Range: 22 0 - 28 0 mmol/L 37 4 (H)   Base Excess, Arterial Latest Units: mmol/L 9 6   O2 Content, Arterial Latest Ref Range: 16 0 - 23 0 mL/dL 14 7 (L)   O2 HGB,Arterial Latest Ref Range: 94 0 - 97 0 % 94 2   ABG SOURCE Unknown Radial, Left   NASAL CANNULA Unknown 3L

## 2020-01-04 NOTE — PROGRESS NOTES
Progress Note - Kathleen Laura 1960, 61 y o  female MRN: [id number]    Unit/Bed#: -01 Encounter: [id number]    Primary Care Provider: Connor Alvarez DO   Date and time admitted to hospital: 12/28/2019 12:14 PM        * Cellulitis of right lower extremity  Assessment & Plan  · Continue IV vancomycin for now  · The IV zosyn was discontinued on 01/03/2020  · PT/OT    MRSA culture [[id number]] (Normal) Collected: 12/31/19 1925   Lab Status: Final result Specimen: Nares from Nose Updated: 01/02/20 1241    MRSA Culture Only No Methicillin Resistant Staphlyococcus aureus (MRSA) isolated         Acute respiratory failure with hypoxia and hypercapnia (HCC)  Assessment & Plan  · Probable undiagnosed obesity-hypoventilation syndrome and severe obstructive sleep apnea  · Trial Bipap QHS for a goal oxygen saturation of 90% and above  · Utilize supplemental oxygen via the nasal cannula to maintain oxygen saturation levels at 90% and above  · She will need an outpatient sleep study completed as soon as possible to check for obstructive sleep apnea  · Outpatient Bariatric Surgery evaluation    Results for [facility] (MRN [id number]) as of 1/4/2020 13:47   Ref   Range 1/4/2020 10:09   pH, Arterial Latest Ref Range: 7 350 - 7 450  7 344 (L)   pCO2, Arterial Latest Ref Range: 36 0 - 44 0 mm Hg 70 3 (HH)   pO2, Arterial Latest Ref Range: 75 0 - 129 0 mm Hg 81 3   HCO3, Arterial Latest Ref Range: 22 0 - 28 0 mmol/L 37 4 (H)   Base Excess, Arterial Latest Units: mmol/L 9 6   O2 Content, Arterial Latest Ref Range: 16 0 - 23 0 mL/dL 14 7 (L)   O2 HGB,Arterial Latest Ref Range: 94 0 - 97 0 % 94 2   ABG SOURCE Unknown Radial, Left   NASAL CANNULA Unknown 3L       Acute metabolic encephalopathy  Assessment & Plan  · Present on admission  · Likely secondary to acute infection and secondary to hypoxia as well as hypercapnia  · The patient's neurologic status has improved    Ambulatory dysfunction  Assessment & Plan  · The patient was evaluated by physical therapy and occupational therapy during the hospitalization  · The patient is unable to ambulate at this time  · The patient requires short-term rehabilitation placement upon discharge  · The patient would be an excellent candidate for LTACH placement    Prolonged QT interval  Assessment & Plan  · Avoid all QT-prolonging agents  · Follow the QT interval  · Outpatient Cardiology evaluation    Hypophosphatemia  Assessment & Plan  · Resolved with PO phosphorus supplementation  · Follow the phosphorus level    Dysphagia  Assessment & Plan  · Consult speech therapy for a dysphagia evaluation  · Aspiration precautions at all times    Anemia  Assessment & Plan  · Check an iron panel, vitamin B12 level, and folate level  · Follow the CBC  · Transfuse for a hemoglobin less than 7 g/dl    Acquired hypothyroidism  Assessment & Plan  · Continue her home dose of PO levothyroxine  Outpatient follow-up with PCP in regards to this matter      Left foot pain  Assessment & Plan  · Secondary to fall during the hospitalization    XR foot 2 vw left   Status: Final result   PACS Images      Show images for XR foot 2 vw left   Study Result     LEFT FOOT     INDICATION:   left foot pain, trauma, can be done portable      COMPARISON:  None     VIEWS:  XR FOOT 2 VW LEFT         FINDINGS:     There is no acute fracture or dislocation      No significant degenerative changes      No lytic or blastic lesions seen      Soft tissues are unremarkable      IMPRESSION:     No acute osseous abnormality         XR tibia fibula 2 vw left   Status: Final result   PACS Images      Show images for XR tibia fibula 2 vw left   Study Result     LEFT TIBIA AND FIBULA     INDICATION:   left ankle pain, can be done portable      COMPARISON:  None     VIEWS:  XR TIBIA FIBULA 2 VW LEFT         FINDINGS:     There is no acute fracture or dislocation      Advanced knee joint osteoarthritis is present      No lytic or blastic lesions are seen      Soft tissues are unremarkable      IMPRESSION:     No acute osseous abnormality             Elevated troponin level  Assessment & Plan  · Likely secondary to acute infection  · Outpatient Cardiology evaluation    Echo complete with contrast if indicated   Status: Final result   PACS Images      Show images for Echo complete with contrast if indicated   Study Result     [address]  [facility], [address] [place] [address]     Transthoracic Echocardiogram  2D, M-mode, Doppler, and Color Doppler     Study date:  2020     Patient: Lilly Horne  MR number: [id number]  Account number: [de-identified]  : 1960  Age: 61 years  Gender: Female  Status: Inpatient  Location: [facility]  Height: 63 in  Weight: 426 lb  BP: 134/ 66 mmHg     Indications: MI     Diagnoses: I21 4 - Non-ST elevation (NSTEMI) myocardial infarction     Sonographer:  HAILEY Howell  Primary Physician:  Jacob Levy DO  Referring Physician:  Saurabh Huffman DO  Group:  [facility]  Interpreting Physician:  Jackie Wills DO     IMPRESSIONS:  Technically difficult study     SUMMARY     LEFT VENTRICLE:  Difficult to assess complete regional wal motion  Mildly abnormal septal motion, more consistant with conduction delay  Size was normal   Systolic function was normal  Ejection fraction was estimated in the range of 50 % to 55 %  This study was inadequate for the evaluation of regional wall motion  Wall thickness was mildly increased      TRICUSPID VALVE:  There was trace regurgitation      HISTORY: PRIOR HISTORY: Elevated troponins, morbid obesity     PROCEDURE: The study was performed in the [facility]  This was a routine study  The transthoracic approach was used  The study included complete 2D imaging, M-mode, complete spectral Doppler, and color Doppler  The heart  rate was 87 bpm, at the start of the study   Images were obtained from the parasternal, apical, subcostal, and suprasternal notch acoustic windows  Intravenous contrast ( 1 0 ml of Definity) was administered to opacify the left ventricle  Echocardiographic views were limited due to restricted patient mobility, poor patient compliance, poor acoustic window availability, and decreased penetration  This was a technically difficult study      LEFT VENTRICLE: Difficult to assess complete regional wal motion  Mildly abnormal septal motion, more consistant with conduction delay  Size was normal  Systolic function was normal  Ejection fraction was estimated in the range of 50 % to  55 %  This study was inadequate for the evaluation of regional wall motion  Wall thickness was mildly increased  DOPPLER: Left ventricular diastolic function parameters were normal for the patient's age      RIGHT VENTRICLE: The size was normal  Systolic function was normal      LEFT ATRIUM: Size was normal      RIGHT ATRIUM: Size was normal      MITRAL VALVE: Valve structure was normal  DOPPLER: There was no significant regurgitation      AORTIC VALVE: The valve was probably trileaflet  DOPPLER: There was no significant regurgitation      TRICUSPID VALVE: The valve structure was normal  DOPPLER: There was trace regurgitation      PULMONIC VALVE: DOPPLER: There was no evidence for stenosis   There was no regurgitation      PERICARDIUM: There was no pericardial effusion      AORTA: The root exhibited normal size      SYSTEM MEASUREMENT TABLES     2D  %FS: 29 38 %  AV Diam: 2 58 cm  EDV(Teich): 72 64 ml  EF(Teich): 56 81 %  ESV(Teich): 31 37 ml  IVSd: 1 35 cm  LVEDV MOD A4C: 97 9 ml  LVEF MOD A4C: 65 45 %  LVESV MOD A4C: 33 83 ml  LVIDd: 4 06 cm  LVIDs: 2 87 cm  LVLd A4C: 8 48 cm  LVLs A4C: 6 48 cm  LVPWd: 1 06 cm  RWT: 0 52  SV MOD A4C: 64 08 ml  SV(Teich): 41 27 ml     PW  E': 0 14 m/s  E/E': 7 27  MV A Philip: 0 93 m/s  MV Dec Carson: 11 15 m/s2  MV DecT: 92 15 ms  MV E Philip: 1 03 m/s  MV E/A Ratio: 1 11     [facility]     Prepared and electronically signed by  Radha Stevens   Signed 03-Jan-2020 10:27:55            Skin breakdown  Assessment & Plan  · The patient was evaluated by the Wound Care nurse with the following impressions/recommendations:  Wound Care     Bilateral lower legs: wash legs with soap and water and a towel avoiding wounds to remove dead skin build up  Irrigate wounds with normal saline and pat dry  Apply thick layer of Silvasorb gel directly to wound base or apply to gauze then place directly on wound  Cover with 4x4 gauze and an ABD then secure with Kerlix and paper tape  Change once daily or as needed for strike through drainage       Right medial thigh: Apply skin prep to periwound and allow to dry  Loosely tuck 1/4" Iodoform packing strip into wound and use a small piece of tape to secure tail to skin  Cover with Allevyn non bordered foam and change daily or as needed for strike through drainage       Right posterior knee: Wash with soap and water then pat dry  Apply Maxorb Ag Rope to to inner aspect of knee, Change daily or as needed for strikethrough drainage       Right Lower Abdomen: Irrigate with normal saline then pat dry  Line underside of pannus with Maxorb Ag Rope and change daily       Wash skin folds with soap and water then pat dry  Dust skin folds underneath breasts, bilateral groin and torso with Nystatin powder three times daily       Apply Lac-Hydrin (ammonium lactate) lotion to bilateral legs avoiding wound base one to two times a day      Skin care Plan:  1-Protect sacrum w/Allevyn foam, luis enrique with P, change q3d and PRN, check skin q-shift  2-Turn/reposition q2h or when medically stable for pressure re-distribution on skin   3-Elevate heels to offload pressure]  4-Moisturize skin daily with skin nourishing cream  5-Ehob cushion in chair when out of bed    6-Hydraguard to bilateral heels BID and PRN        Morbid obesity with BMI of 70 and over, adult [facility])  Assessment & Plan  · Lifestyle modifications are recommended including weight loss, improving her diet, and increasing her amount of activity  · She will need an outpatient sleep study completed as soon as possible to check for obstructive sleep apnea  · Outpatient Bariatric Surgery evaluation    Transaminitis  Assessment & Plan  · Improved  · Avoid all hepatotoxic agents  · Follow the LFT's (liver function tests)    Results for [facility] (MRN [id number]) as of 1/2/2020 14:45   Ref  Range 1/1/2020 06:22   HEPATITIS A IGM ANTIBODY Latest Ref Range: Non-reactive, Equivocal-Suggest Recollect  Non-reactive   HEPATITIS B SURFACE ANTIGEN Latest Ref Range: Non-reactive, NonReactive - Confirmed  Non-reactive   HEPATITIS B CORE IGM ANTIBODY Latest Ref Range: Non-reactive  Non-reactive   HEPATITIS C ANTIBODY Latest Ref Range: Non-reactive  Non-reactive       Lymphedema  Assessment & Plan  · Chronic lymphedema of the bilateral lower extremities  · Outpatient lymphedema therapy      VTE Pharmacologic Prophylaxis:   Pharmacologic: Enoxaparin (Lovenox) 60 mg SQ every 12 hours (Dose based on her BMI)  Mechanical VTE Prophylaxis in Place: No SCD with cellulitis of the right lower extremity and with venous stasis dermatitis of the bilateral lower extremities    Patient Centered Rounds: I have performed bedside rounds with nursing staff today  Education and Discussions with Family / Patient:  I updated the patient's  outside of the patient's room  Current Length of Stay: 7 day(s)    Current Patient Status: Inpatient   Certification Statement: The patient will continue to require additional inpatient hospital stay due to the need for IV antibiotic treatment, for titration of the Bipap machine QHS, and for short-term rehabilitation placement  Code Status: Level 1 - Full Code      Subjective: The patient was seen and examined    The patient continues to complain of bilateral lower extremity pain  Objective:     Vitals:   Temp (24hrs), Av 5 °F (36 9 °C), Min:97 7 °F (36 5 °C), Max:99 4 °F (37 4 °C)    Temp:  [97 7 °F (36 5 °C)-99 4 °F (37 4 °C)] 97 7 °F (36 5 °C)  HR:  [77-82] 82  Resp:  [18-19] 18  BP: (124-127)/(59-60) 127/60  SpO2:  [92 %-99 %] 97 %  Body mass index is 75 57 kg/m²  Input and Output Summary (last 24 hours): Intake/Output Summary (Last 24 hours) at 2020 1352  Last data filed at 2020 1248  Gross per 24 hour   Intake 1080 ml   Output 1650 ml   Net -570 ml       Physical Exam:     Physical Exam  General:  NAD, awake, alert, follows commands  HEENT:  NC/AT, mucous membranes moist  Neck:  Supple, No JVP elevation  CV:  + S1, + S2, RRR  Pulm:  Decreased breath sounds bilaterally  Abd:  Soft, Non-tender, Non-distended  Ext:  No clubbing/cyanosis, Edema of the bilateral lower extremities  Skin:  Erythema of the right anterior shin, Venous stasis dermatitis of the bilateral anterior shins      Additional Data:    Labs:    Results from last 7 days   Lab Units 20  0455   WBC Thousand/uL 4 68   HEMOGLOBIN g/dL 9 6*   HEMATOCRIT % 36 1   PLATELETS Thousands/uL 278   NEUTROS PCT % 55   LYMPHS PCT % 18   MONOS PCT % 12   EOS PCT % 14*     Results from last 7 days   Lab Units 20  0455   SODIUM mmol/L 141   POTASSIUM mmol/L 3 5   CHLORIDE mmol/L 104   CO2 mmol/L 37*   BUN mg/dL 8   CREATININE mg/dL 0 82   ANION GAP mmol/L 0*   CALCIUM mg/dL 7 6*   ALBUMIN g/dL 1 8*   TOTAL BILIRUBIN mg/dL 0 47   ALK PHOS U/L 63   ALT U/L 24   AST U/L 18   GLUCOSE RANDOM mg/dL 90                 Results from last 7 days   Lab Units 20  0513 20  0505 20  0623 20  0622   LACTIC ACID mmol/L  --   --   --  0 9   PROCALCITONIN ng/ml 0 11 0 11 0 11  --            * I Have Reviewed All Lab Data Listed Above  * Additional Pertinent Lab Tests Reviewed:  All [place] Admission Reviewed      Recent Cultures (last 7 days):     Results from last 7 days   Lab Units 12/28/19 2039   URINE CULTURE  30,000-39,000 cfu/ml        Last 24 Hours Medication List:     Current Facility-Administered Medications:  acetaminophen 650 mg Oral Q6H PRN [facility], DO    albuterol 2 5 mg Nebulization Q4H PRN [facility], DO    ammonium lactate  Topical BID [facility], DO    cholecalciferol 1,000 Units Oral Daily [facility], DO    enoxaparin 60 mg Subcutaneous Q12H [address] Hilario Valdes, DO    FLUoxetine 20 mg Oral Daily [facility], DO    levothyroxine 125 mcg Oral Early Morning [facility], DO    nystatin  Topical BID Jeffery Duran MD    ondansetron 4 mg Intravenous Q8H PRN Vanessa Trammell, CRNP    potassium chloride 40 mEq Oral Once [facility], DO    vancomycin 12 5 mg/kg (Adjusted) Intravenous Q12H Jeffery Duran MD Last Rate: 1,250 mg (01/04/20 0223)        Today, Patient Was Seen By: [facility], DO    ** Please Note: Dictation voice to text software may have been used in the creation of this document   **

## 2020-01-04 NOTE — ASSESSMENT & PLAN NOTE
· Continue IV vancomycin for now  · The IV zosyn was discontinued on 01/03/2020  · PT/OT    MRSA culture [[id number]] (Normal) Collected: 12/31/19 1925   Lab Status: Final result Specimen: Nares from Nose Updated: 01/02/20 1241    MRSA Culture Only No Methicillin Resistant Staphlyococcus aureus (MRSA) isolated

## 2020-01-05 PROBLEM — E53.8 FOLIC ACID DEFICIENCY: Status: ACTIVE | Noted: 2020-01-05

## 2020-01-05 LAB
ALBUMIN SERPL BCP-MCNC: 1.8 G/DL (ref 3.5–5)
ALP SERPL-CCNC: 61 U/L (ref 46–116)
ALT SERPL W P-5'-P-CCNC: 20 U/L (ref 12–78)
ANION GAP SERPL CALCULATED.3IONS-SCNC: 0 MMOL/L (ref 4–13)
AST SERPL W P-5'-P-CCNC: 15 U/L (ref 5–45)
BASOPHILS # BLD AUTO: 0.05 THOUSANDS/ΜL (ref 0–0.1)
BASOPHILS NFR BLD AUTO: 1 % (ref 0–1)
BILIRUB SERPL-MCNC: 0.41 MG/DL (ref 0.2–1)
BUN SERPL-MCNC: 7 MG/DL (ref 5–25)
CALCIUM SERPL-MCNC: 8 MG/DL (ref 8.3–10.1)
CHLORIDE SERPL-SCNC: 105 MMOL/L (ref 100–108)
CO2 SERPL-SCNC: 38 MMOL/L (ref 21–32)
CREAT SERPL-MCNC: 0.89 MG/DL (ref 0.6–1.3)
EOSINOPHIL # BLD AUTO: 0.8 THOUSAND/ΜL (ref 0–0.61)
EOSINOPHIL NFR BLD AUTO: 15 % (ref 0–6)
ERYTHROCYTE [DISTWIDTH] IN BLOOD BY AUTOMATED COUNT: 15.6 % (ref 11.6–15.1)
GFR SERPL CREATININE-BSD FRML MDRD: 71 ML/MIN/1.73SQ M
GLUCOSE SERPL-MCNC: 97 MG/DL (ref 65–140)
HCT VFR BLD AUTO: 36.7 % (ref 34.8–46.1)
HGB BLD-MCNC: 10.1 G/DL (ref 11.5–15.4)
IMM GRANULOCYTES # BLD AUTO: 0.01 THOUSAND/UL (ref 0–0.2)
IMM GRANULOCYTES NFR BLD AUTO: 0 % (ref 0–2)
LYMPHOCYTES # BLD AUTO: 0.95 THOUSANDS/ΜL (ref 0.6–4.47)
LYMPHOCYTES NFR BLD AUTO: 18 % (ref 14–44)
MAGNESIUM SERPL-MCNC: 1.9 MG/DL (ref 1.6–2.6)
MCH RBC QN AUTO: 24.4 PG (ref 26.8–34.3)
MCHC RBC AUTO-ENTMCNC: 27.5 G/DL (ref 31.4–37.4)
MCV RBC AUTO: 89 FL (ref 82–98)
MONOCYTES # BLD AUTO: 0.61 THOUSAND/ΜL (ref 0.17–1.22)
MONOCYTES NFR BLD AUTO: 12 % (ref 4–12)
NEUTROPHILS # BLD AUTO: 2.9 THOUSANDS/ΜL (ref 1.85–7.62)
NEUTS SEG NFR BLD AUTO: 54 % (ref 43–75)
NRBC BLD AUTO-RTO: 0 /100 WBCS
PHOSPHATE SERPL-MCNC: 3 MG/DL (ref 2.7–4.5)
PLATELET # BLD AUTO: 281 THOUSANDS/UL (ref 149–390)
PMV BLD AUTO: 9.7 FL (ref 8.9–12.7)
POTASSIUM SERPL-SCNC: 3.8 MMOL/L (ref 3.5–5.3)
PROT SERPL-MCNC: 6.4 G/DL (ref 6.4–8.2)
RBC # BLD AUTO: 4.14 MILLION/UL (ref 3.81–5.12)
SODIUM SERPL-SCNC: 143 MMOL/L (ref 136–145)
WBC # BLD AUTO: 5.32 THOUSAND/UL (ref 4.31–10.16)

## 2020-01-05 PROCEDURE — 80053 COMPREHEN METABOLIC PANEL: CPT | Performed by: INTERNAL MEDICINE

## 2020-01-05 PROCEDURE — 99232 SBSQ HOSP IP/OBS MODERATE 35: CPT | Performed by: INTERNAL MEDICINE

## 2020-01-05 PROCEDURE — 84100 ASSAY OF PHOSPHORUS: CPT | Performed by: INTERNAL MEDICINE

## 2020-01-05 PROCEDURE — 85025 COMPLETE CBC W/AUTO DIFF WBC: CPT | Performed by: INTERNAL MEDICINE

## 2020-01-05 PROCEDURE — 83735 ASSAY OF MAGNESIUM: CPT | Performed by: INTERNAL MEDICINE

## 2020-01-05 RX ORDER — FOLIC ACID 1 MG/1
1 TABLET ORAL DAILY
Status: DISCONTINUED | OUTPATIENT
Start: 2020-01-06 | End: 2020-01-11 | Stop reason: HOSPADM

## 2020-01-05 RX ORDER — DIPHENHYDRAMINE HCL 25 MG
25 TABLET ORAL ONCE
Status: COMPLETED | OUTPATIENT
Start: 2020-01-05 | End: 2020-01-05

## 2020-01-05 RX ORDER — ALPRAZOLAM 0.25 MG/1
0.25 TABLET ORAL 3 TIMES DAILY PRN
Status: DISCONTINUED | OUTPATIENT
Start: 2020-01-05 | End: 2020-01-05

## 2020-01-05 RX ADMIN — Medication: at 17:39

## 2020-01-05 RX ADMIN — ENOXAPARIN SODIUM 60 MG: 60 INJECTION SUBCUTANEOUS at 21:01

## 2020-01-05 RX ADMIN — NYSTATIN: 100000 POWDER TOPICAL at 08:57

## 2020-01-05 RX ADMIN — MELATONIN 1000 UNITS: at 08:58

## 2020-01-05 RX ADMIN — VANCOMYCIN HYDROCHLORIDE 2000 MG: 5 INJECTION, POWDER, LYOPHILIZED, FOR SOLUTION INTRAVENOUS at 22:38

## 2020-01-05 RX ADMIN — Medication: at 08:57

## 2020-01-05 RX ADMIN — ENOXAPARIN SODIUM 60 MG: 60 INJECTION SUBCUTANEOUS at 08:58

## 2020-01-05 RX ADMIN — FLUOXETINE 20 MG: 20 CAPSULE ORAL at 08:57

## 2020-01-05 RX ADMIN — NYSTATIN: 100000 POWDER TOPICAL at 17:39

## 2020-01-05 RX ADMIN — DIPHENHYDRAMINE HCL 25 MG: 25 TABLET ORAL at 21:01

## 2020-01-05 RX ADMIN — ACETAMINOPHEN 650 MG: 325 TABLET ORAL at 08:57

## 2020-01-05 RX ADMIN — LEVOTHYROXINE SODIUM 125 MCG: 125 TABLET ORAL at 05:55

## 2020-01-05 RX ADMIN — FOLIC ACID 1 MG: 5 INJECTION, SOLUTION INTRAMUSCULAR; INTRAVENOUS; SUBCUTANEOUS at 11:15

## 2020-01-05 NOTE — ASSESSMENT & PLAN NOTE
· Likely secondary to acute infection  · Outpatient Cardiology evaluation    Echo complete with contrast if indicated   Status: Final result   PACS Images      Show images for Echo complete with contrast if indicated   Study Result     [address]  [facility], [address] [place] [address]     Transthoracic Echocardiogram  2D, M-mode, Doppler, and Color Doppler     Study date:  2020     Patient: Joanne Almazan  MR number: [id number]  Account number: [de-identified]  : 1960  Age: 61 years  Gender: Female  Status: Inpatient  Location: [facility]  Height: 63 in  Weight: 426 lb  BP: 134/ 66 mmHg     Indications: MI     Diagnoses: I21 4 - Non-ST elevation (NSTEMI) myocardial infarction     Sonographer:  HAILEY Kauffman  Primary Physician:  Isa Menezes DO  Referring Physician:  Lester Man DO  Group:  [facility]  Interpreting Physician:  Davin Dumont DO     IMPRESSIONS:  Technically difficult study     SUMMARY     LEFT VENTRICLE:  Difficult to assess complete regional wal motion  Mildly abnormal septal motion, more consistant with conduction delay  Size was normal   Systolic function was normal  Ejection fraction was estimated in the range of 50 % to 55 %  This study was inadequate for the evaluation of regional wall motion  Wall thickness was mildly increased      TRICUSPID VALVE:  There was trace regurgitation      HISTORY: PRIOR HISTORY: Elevated troponins, morbid obesity     PROCEDURE: The study was performed in the [facility]  This was a routine study  The transthoracic approach was used  The study included complete 2D imaging, M-mode, complete spectral Doppler, and color Doppler  The heart  rate was 87 bpm, at the start of the study  Images were obtained from the parasternal, apical, subcostal, and suprasternal notch acoustic windows   Intravenous contrast ( 1 0 ml of Definity) was administered to opacify the left ventricle  Echocardiographic views were limited due to restricted patient mobility, poor patient compliance, poor acoustic window availability, and decreased penetration  This was a technically difficult study      LEFT VENTRICLE: Difficult to assess complete regional wal motion  Mildly abnormal septal motion, more consistant with conduction delay  Size was normal  Systolic function was normal  Ejection fraction was estimated in the range of 50 % to  55 %  This study was inadequate for the evaluation of regional wall motion  Wall thickness was mildly increased  DOPPLER: Left ventricular diastolic function parameters were normal for the patient's age      RIGHT VENTRICLE: The size was normal  Systolic function was normal      LEFT ATRIUM: Size was normal      RIGHT ATRIUM: Size was normal      MITRAL VALVE: Valve structure was normal  DOPPLER: There was no significant regurgitation      AORTIC VALVE: The valve was probably trileaflet  DOPPLER: There was no significant regurgitation      TRICUSPID VALVE: The valve structure was normal  DOPPLER: There was trace regurgitation      PULMONIC VALVE: DOPPLER: There was no evidence for stenosis  There was no regurgitation      PERICARDIUM: There was no pericardial effusion      AORTA: The root exhibited normal size      SYSTEM MEASUREMENT TABLES     2D  %FS: 29 38 %  AV Diam: 2 58 cm  EDV(Teich): 72 64 ml  EF(Teich): 56 81 %  ESV(Teich): 31 37 ml  IVSd: 1 35 cm  LVEDV MOD A4C: 97 9 ml  LVEF MOD A4C: 65 45 %  LVESV MOD A4C: 33 83 ml  LVIDd: 4 06 cm  LVIDs: 2 87 cm  LVLd A4C: 8 48 cm  LVLs A4C: 6 48 cm  LVPWd: 1 06 cm  RWT: 0 52  SV MOD A4C: 64 08 ml  SV(Teich): 41 27 ml     PW  E': 0 14 m/s  E/E': 7 27  MV A Philip: 0 93 m/s  MV Dec Conway: 11 15 m/s2  MV DecT: 92 15 ms  MV E Philip: 1 03 m/s  MV E/A Ratio: 1 11     [facility] Accredited [facility]     Prepared and electronically signed by  Quang Shaw DO  Signed 03-Jan-2020 10:27:55

## 2020-01-05 NOTE — PROGRESS NOTES
Progress Note - Constanza Pearce 1960, 61 y o  female MRN: [id number]    Unit/Bed#: -01 Encounter: [id number]    Primary Care Provider: Adriana Xavier DO   Date and time admitted to hospital: 12/28/2019 12:14 PM        * Cellulitis of right lower extremity  Assessment & Plan  · Continue IV vancomycin for now to complete a 10-day antibiotic course  · The IV zosyn was discontinued on 01/03/2020  · PT/OT    MRSA culture [[id number]] (Normal) Collected: 12/31/19 1925   Lab Status: Final result Specimen: Nares from Nose Updated: 01/02/20 1241    MRSA Culture Only No Methicillin Resistant Staphlyococcus aureus (MRSA) isolated         Acute respiratory failure with hypoxia and hypercapnia (HCC)  Assessment & Plan  · Probable undiagnosed obesity-hypoventilation syndrome and severe obstructive sleep apnea  · Trial Bipap QHS and anytime while sleeping for a goal oxygen saturation of 90% and above  · Utilize supplemental oxygen via the nasal cannula to maintain oxygen saturation levels at 90% and above  · She will need an outpatient sleep study completed as soon as possible to check for obstructive sleep apnea  · Consult Pulmonology when available  · Outpatient Bariatric Surgery evaluation    Results for [facility] (MRN [id number]) as of 1/4/2020 13:47   Ref   Range 1/4/2020 10:09   pH, Arterial Latest Ref Range: 7 350 - 7 450  7 344 (L)   pCO2, Arterial Latest Ref Range: 36 0 - 44 0 mm Hg 70 3 (HH)   pO2, Arterial Latest Ref Range: 75 0 - 129 0 mm Hg 81 3   HCO3, Arterial Latest Ref Range: 22 0 - 28 0 mmol/L 37 4 (H)   Base Excess, Arterial Latest Units: mmol/L 9 6   O2 Content, Arterial Latest Ref Range: 16 0 - 23 0 mL/dL 14 7 (L)   O2 HGB,Arterial Latest Ref Range: 94 0 - 97 0 % 94 2   ABG SOURCE Unknown Radial, Left   NASAL CANNULA Unknown 3L       Acute metabolic encephalopathy  Assessment & Plan  · Present on admission  · Likely secondary to acute infection and secondary to hypoxia as well as hypercapnia  · The patient's neurologic status has improved    Ambulatory dysfunction  Assessment & Plan  · The patient was evaluated by physical therapy and occupational therapy during the hospitalization  · The patient is unable to ambulate at this time  · The patient requires short-term rehabilitation placement upon discharge  · The patient would be an excellent candidate for LTACH placement    Folic acid deficiency  Assessment & Plan  · Give folic acid 1 mg IV x 1 dose on 01/05/2020, and continue folic acid 1 mg PO Qdaily    Prolonged QT interval  Assessment & Plan  · Avoid all QT-prolonging agents  · Follow the QT interval  · Outpatient Cardiology evaluation    Hypophosphatemia  Assessment & Plan  · Resolved with PO phosphorus supplementation  · Follow the phosphorus level    Dysphagia  Assessment & Plan  · Consult speech therapy for a dysphagia evaluation  · Aspiration precautions at all times    Anemia  Assessment & Plan  · With folic acid deficiency  · Give folic acid 1 mg IV x 1 dose on 01/05/2020, and continue folic acid 1 mg PO Qdaily  · Follow the CBC  · Transfuse for a hemoglobin less than 7 g/dl    Results for [facility] (MRN [id number]) as of 1/5/2020 10:22   Ref   Range 1/4/2020 04:55   Iron Latest Ref Range: 50 - 170 ug/dL 52   Ferritin Latest Ref Range: 8 - 388 ng/mL 54   Iron Saturation Latest Units: % 18   TIBC Latest Ref Range: 250 - 450 ug/dL 295   Folate Latest Ref Range: 3 1 - 17 5 ng/mL 2 4 (L)   Vitamin B-12 Latest Ref Range: 100 - 900 pg/mL 1,050 (H)       Acquired hypothyroidism  Assessment & Plan  · Continue her home dose of PO levothyroxine  Outpatient follow-up with PCP in regards to this matter      Left foot pain  Assessment & Plan  · Secondary to fall during the hospitalization    XR foot 2 vw left   Status: Final result   PACS Images      Show images for XR foot 2 vw left   Study Result     LEFT FOOT     INDICATION:   left foot pain, trauma, can be done portable      COMPARISON:  None     VIEWS:  XR FOOT 2 VW LEFT         FINDINGS:     There is no acute fracture or dislocation      No significant degenerative changes      No lytic or blastic lesions seen      Soft tissues are unremarkable      IMPRESSION:     No acute osseous abnormality         XR tibia fibula 2 vw left   Status: Final result   PACS Images      Show images for XR tibia fibula 2 vw left   Study Result     LEFT TIBIA AND FIBULA     INDICATION:   left ankle pain, can be done portable      COMPARISON:  None     VIEWS:  XR TIBIA FIBULA 2 VW LEFT         FINDINGS:     There is no acute fracture or dislocation      Advanced knee joint osteoarthritis is present      No lytic or blastic lesions are seen      Soft tissues are unremarkable      IMPRESSION:     No acute osseous abnormality             Elevated troponin level  Assessment & Plan  · Likely secondary to acute infection  · Outpatient Cardiology evaluation    Echo complete with contrast if indicated   Status: Final result   PACS Images      Show images for Echo complete with contrast if indicated   Study Result     [address]     Transthoracic Echocardiogram  2D, M-mode, Doppler, and Color Doppler     Study date:  2020     [facility]  MR number: [id number]  Account number: [de-identified]  : 1960  Age: 61 years  Gender: Female  Status: Inpatient  Location: [facility] Echo Lab  Height: 63 in  Weight: 426 lb  BP: 134/ 66 mmHg     Indications: MI     Diagnoses: I21 4 - Non-ST elevation (NSTEMI) myocardial infarction     Sonographer:  HAILEY Noland  Primary Physician:  Jamaal Rogers DO  Referring Physician:  Scott Howell DO  Group:  [facility]  Interpreting Physician:  Jhon Selby DO     IMPRESSIONS:  Technically difficult study     SUMMARY     LEFT VENTRICLE:  Difficult to assess complete regional wal motion  Mildly abnormal septal motion, more consistant with conduction delay  Size was normal   Systolic function was normal  Ejection fraction was estimated in the range of 50 % to 55 %  This study was inadequate for the evaluation of regional wall motion  Wall thickness was mildly increased      TRICUSPID VALVE:  There was trace regurgitation      HISTORY: PRIOR HISTORY: Elevated troponins, morbid obesity     PROCEDURE: The study was performed in the [facility] Echo Lab  This was a routine study  The transthoracic approach was used  The study included complete 2D imaging, M-mode, complete spectral Doppler, and color Doppler  The heart  rate was 87 bpm, at the start of the study  Images were obtained from the parasternal, apical, subcostal, and suprasternal notch acoustic windows  Intravenous contrast ( 1 0 ml of Definity) was administered to opacify the left ventricle  Echocardiographic views were limited due to restricted patient mobility, poor patient compliance, poor acoustic window availability, and decreased penetration  This was a technically difficult study      LEFT VENTRICLE: Difficult to assess complete regional wal motion  Mildly abnormal septal motion, more consistant with conduction delay  Size was normal  Systolic function was normal  Ejection fraction was estimated in the range of 50 % to  55 %  This study was inadequate for the evaluation of regional wall motion  Wall thickness was mildly increased  DOPPLER: Left ventricular diastolic function parameters were normal for the patient's age      RIGHT VENTRICLE: The size was normal  Systolic function was normal      LEFT ATRIUM: Size was normal      RIGHT ATRIUM: Size was normal      MITRAL VALVE: Valve structure was normal  DOPPLER: There was no significant regurgitation      AORTIC VALVE: The valve was probably trileaflet   DOPPLER: There was no significant regurgitation      TRICUSPID VALVE: The valve structure was normal  DOPPLER: There was trace regurgitation      PULMONIC VALVE: DOPPLER: There was no evidence for stenosis  There was no regurgitation      PERICARDIUM: There was no pericardial effusion      AORTA: The root exhibited normal size      SYSTEM MEASUREMENT TABLES     2D  %FS: 29 38 %  AV Diam: 2 58 cm  EDV(Teich): 72 64 ml  EF(Teich): 56 81 %  ESV(Teich): 31 37 ml  IVSd: 1 35 cm  LVEDV MOD A4C: 97 9 ml  LVEF MOD A4C: 65 45 %  LVESV MOD A4C: 33 83 ml  LVIDd: 4 06 cm  LVIDs: 2 87 cm  LVLd A4C: 8 48 cm  LVLs A4C: 6 48 cm  LVPWd: 1 06 cm  RWT: 0 52  SV MOD A4C: 64 08 ml  SV(Teich): 41 27 ml     PW  E': 0 14 m/s  E/E': 7 27  MV A Philip: 0 93 m/s  MV Dec [place]: 11 15 m/s2  MV DecT: 92 15 ms  MV E Philip: 1 03 m/s  MV E/A Ratio: 1 11     [facility] Accredited Echocardiography Laboratory     Prepared and electronically signed by  Pam Escalera DO  Signed 03-Jan-2020 10:27:55            Skin breakdown  Assessment & Plan  · The patient was evaluated by the Wound Care nurse with the following impressions/recommendations:  Wound Care     Bilateral lower legs: wash legs with soap and water and a towel avoiding wounds to remove dead skin build up  Irrigate wounds with normal saline and pat dry  Apply thick layer of Silvasorb gel directly to wound base or apply to gauze then place directly on wound  Cover with 4x4 gauze and an ABD then secure with Kerlix and paper tape  Change once daily or as needed for strike through drainage       Right medial thigh: Apply skin prep to periwound and allow to dry  Loosely tuck 1/4" Iodoform packing strip into wound and use a small piece of tape to secure tail to skin  Cover with Allevyn non bordered foam and change daily or as needed for strike through drainage       Right posterior knee: Wash with soap and water then pat dry  Apply Maxorb Ag Rope to to inner aspect of knee, Change daily or as needed for strikethrough drainage       Right Lower Abdomen: Irrigate with normal saline then pat dry   Line underside of pannus with Maxorb Ag Rope and change daily       Wash skin folds with soap and water then pat dry  Dust skin folds underneath breasts, bilateral groin and torso with Nystatin powder three times daily       Apply Lac-Hydrin (ammonium lactate) lotion to bilateral legs avoiding wound base one to two times a day      Skin care Plan:  1-Protect sacrum w/Allevyn foam, luis enrique with P, change q3d and PRN, check skin q-shift  2-Turn/reposition q2h or when medically stable for pressure re-distribution on skin   3-Elevate heels to offload pressure]  4-Moisturize skin daily with skin nourishing cream  5-Ehob cushion in chair when out of bed  6-Hydraguard to bilateral heels BID and PRN        Morbid obesity with BMI of 70 and over, adult [facility])  Assessment & Plan  · Lifestyle modifications are recommended including weight loss, improving her diet, and increasing her amount of activity  · She will need an outpatient sleep study completed as soon as possible to check for obstructive sleep apnea  · Outpatient Bariatric Surgery evaluation    Transaminitis  Assessment & Plan  · Improved  · Avoid all hepatotoxic agents  · Follow the LFT's (liver function tests)    Results for [facility] (MRN [id number]) as of 1/2/2020 14:45   Ref  Range 1/1/2020 06:22   HEPATITIS A IGM ANTIBODY Latest Ref Range: Non-reactive, Equivocal-Suggest Recollect  Non-reactive   HEPATITIS B SURFACE ANTIGEN Latest Ref Range: Non-reactive, NonReactive - Confirmed  Non-reactive   HEPATITIS B CORE IGM ANTIBODY Latest Ref Range: Non-reactive  Non-reactive   HEPATITIS C ANTIBODY Latest Ref Range: Non-reactive  Non-reactive       Lymphedema  Assessment & Plan  · Chronic lymphedema of the bilateral lower extremities  · Outpatient lymphedema therapy        VTE Pharmacologic Prophylaxis:   Pharmacologic: Enoxaparin (Lovenox) 60 mg SQ every 12 hours (dose based on her BMI)  Mechanical VTE Prophylaxis in Place:  No with cellulitis of the right lower extremity and with bilateral venous stasis dermatitis    Patient Centered Rounds: I have performed bedside rounds with nursing staff today  Time Spent for Care: 30 minutes  More than 50% of total time spent on counseling and coordination of care as described above  Current Length of Stay: 8 day(s)    Current Patient Status: Inpatient   Certification Statement: The patient will continue to require additional inpatient hospital stay due to the need for IV antibiotic treatment, the need for titration of Bipap, and the need for short-term rehabilitation placement upon discharge  Code Status: Level 1 - Full Code      Subjective: The patient was seen and examined  The patient is doing better  No chest pain  No shortness of breath  No abdominal pain  No nausea or vomiting  Objective:     Vitals:   Temp (24hrs), Av 8 °F (36 6 °C), Min:97 4 °F (36 3 °C), Max:98 1 °F (36 7 °C)    Temp:  [97 4 °F (36 3 °C)-98 1 °F (36 7 °C)] 98 1 °F (36 7 °C)  HR:  [77-85] 77  Resp:  [18-20] 20  BP: (114-139)/(54-73) 118/60  SpO2:  [95 %-98 %] 97 %  Body mass index is 75 57 kg/m²  Input and Output Summary (last 24 hours):        Intake/Output Summary (Last 24 hours) at 2020 1027  Last data filed at 2020 0730  Gross per 24 hour   Intake 240 ml   Output 1500 ml   Net -1260 ml       Physical Exam:     Physical Exam  General:  NAD, awake, alert, follows commands  HEENT:  NC/AT, mucous membranes moist  Neck:  Supple, No JVP elevation  CV:  + S1, + S2, RRR  Pulm:  Lung fields are CTA bilaterally  Abd:  Soft, Non-tender, Non-distended  Ext:  No clubbing/cyanosis, Edema of the bilateral lower extremities  Skin:  Erythema of the right anterior shin, Venous stasis dermatitis of the bilateral anterior shins      Additional Data:    Labs:    Results from last 7 days   Lab Units 20  0529   WBC Thousand/uL 5 32   HEMOGLOBIN g/dL 10 1*   HEMATOCRIT % 36 7   PLATELETS Thousands/uL 281   NEUTROS PCT % 54   LYMPHS PCT % 18   MONOS PCT % 12   EOS PCT % 15*     Results from last 7 days   Lab Units 01/05/20  0529   SODIUM mmol/L 143   POTASSIUM mmol/L 3 8   CHLORIDE mmol/L 105   CO2 mmol/L 38*   BUN mg/dL 7   CREATININE mg/dL 0 89   ANION GAP mmol/L 0*   CALCIUM mg/dL 8 0*   ALBUMIN g/dL 1 8*   TOTAL BILIRUBIN mg/dL 0 41   ALK PHOS U/L 61   ALT U/L 20   AST U/L 15   GLUCOSE RANDOM mg/dL 97                 Results from last 7 days   Lab Units 01/03/20  0513 01/02/20  0505 01/01/20  0623 01/01/20  0622   LACTIC ACID mmol/L  --   --   --  0 9   PROCALCITONIN ng/ml 0 11 0 11 0 11  --            * I Have Reviewed All Lab Data Listed Above  * Additional Pertinent Lab Tests Reviewed: Patricio 66 Admission Reviewed      Recent Cultures (last 7 days):           Last 24 Hours Medication List:     Current Facility-Administered Medications:  acetaminophen 650 mg Oral Q6H PRN [employer], DO    albuterol 2 5 mg Nebulization Q4H PRN [employer], DO    ammonium lactate  Topical BID [employer], DO    cholecalciferol 1,000 Units Oral Daily [employer], DO    enoxaparin 60 mg Subcutaneous Q12H [address] Hilario Valdes,     FLUoxetine 20 mg Oral Daily [employer], [place]    [START ON 1/7/3082] folic acid 1 mg Oral Daily [employer], DO    folic acid IVPB 1 mg Intravenous Once [employer], DO    levothyroxine 125 mcg Oral Early Morning [employer], DO    nystatin  Topical BID Boni Cockayne, MD    ondansetron 4 mg Intravenous Q8H PRN RICK Barnes    vancomycin 2,000 mg Intravenous Q24H Boni Cockayne, MD Last Rate: 2,000 mg (01/04/20 2134)        Today, Patient Was Seen By: Marcial Teixeira DO    ** Please Note: Dictation voice to text software may have been used in the creation of this document   **

## 2020-01-05 NOTE — PLAN OF CARE
Problem: Potential for Falls  Goal: Patient will remain free of falls  Description  INTERVENTIONS:  - Assess patient frequently for physical needs  -  Identify cognitive and physical deficits and behaviors that affect risk of falls    -  [place] fall precautions as indicated by assessment   - Educate patient/family on patient safety including physical limitations  - Instruct patient to call for assistance with activity based on assessment  - Modify environment to reduce risk of injury  - Consider OT/PT consult to assist with strengthening/mobility  Outcome: Progressing     Problem: Prexisting or High Potential for Compromised Skin Integrity  Goal: Skin integrity is maintained or improved  Description  INTERVENTIONS:  - Identify patients at risk for skin breakdown  - Assess and monitor skin integrity  - Assess and monitor nutrition and hydration status  - Monitor labs   - Assess for incontinence   - Turn and reposition patient  - Assist with mobility/ambulation  - Relieve pressure over bony prominences  - Avoid friction and shearing  - Provide appropriate hygiene as needed including keeping skin clean and dry  - Evaluate need for skin moisturizer/barrier cream  - Collaborate with interdisciplinary team   - Patient/family teaching  - Consider wound care consult   Outcome: Progressing     Problem: PAIN - ADULT  Goal: Verbalizes/displays adequate comfort level or baseline comfort level  Description  Interventions:  - Encourage patient to monitor pain and request assistance  - Assess pain using appropriate pain scale  - Administer analgesics based on type and severity of pain and evaluate response  - Implement non-pharmacological measures as appropriate and evaluate response  - Consider cultural and social influences on pain and pain management  - Notify physician/advanced practitioner if interventions unsuccessful or patient reports new pain  Outcome: Progressing     Problem: INFECTION - ADULT  Goal: Absence or prevention of progression during hospitalization  Description  INTERVENTIONS:  - Assess and monitor for signs and symptoms of infection  - Monitor lab/diagnostic results  - Administer medications as ordered  - Instruct and encourage patient and family to use good hand hygiene technique  - Identify and instruct in appropriate isolation precautions for identified infection/condition   Outcome: Progressing     Problem: SAFETY ADULT  Goal: Maintain or return to baseline ADL function  Description  INTERVENTIONS:  -  Assess patient's ability to carry out ADLs; assess patient's baseline for ADL function and identify physical deficits which impact ability to perform ADLs (bathing, care of mouth/teeth, toileting, grooming, dressing, etc )  - Assess/evaluate cause of self-care deficits   - Assess range of motion  - Assess patient's mobility; develop plan if impaired  - Assess patient's need for assistive devices and provide as appropriate  - Encourage maximum independence but intervene and supervise when necessary  - Involve family in performance of ADLs  - Assess for home care needs following discharge   - Consider OT consult to assist with ADL evaluation and planning for discharge  - Provide patient education as appropriate  Outcome: Progressing  Goal: Maintain or return mobility status to optimal level  Description  INTERVENTIONS:  - Assess patient's baseline mobility status (ambulation, transfers, stairs, etc )    - Identify cognitive and physical deficits and behaviors that affect mobility  - Identify mobility aids required to assist with transfers and/or ambulation (gait belt, sit-to-stand, lift, walker, cane, etc )  - [place] fall precautions as indicated by assessment  - Record patient progress and toleration of activity level on Mobility SBAR; progress patient to next Phase/Stage  - Instruct patient to call for assistance with activity based on assessment  - Consider rehabilitation consult to assist with strengthening/weightbearing, etc   Outcome: Progressing     Problem: DISCHARGE PLANNING  Goal: Discharge to home or other facility with appropriate resources  Description  INTERVENTIONS:  - Identify barriers to discharge w/patient and caregiver  - Arrange for needed discharge resources and transportation as appropriate  - Identify discharge learning needs (meds, wound care, etc )  - Arrange for interpretive services to assist at discharge as needed  - Refer to Case Management Department for coordinating discharge planning if the patient needs post-hospital services based on physician/advanced practitioner order or complex needs related to functional status, cognitive ability, or social support system  Outcome: Progressing     Problem: Knowledge Deficit  Goal: Patient/family/caregiver demonstrates understanding of disease process, treatment plan, medications, and discharge instructions  Description  Complete learning assessment and assess knowledge base    Interventions:  - Provide teaching at level of understanding  - Provide teaching via preferred learning methods  Outcome: Progressing

## 2020-01-05 NOTE — ASSESSMENT & PLAN NOTE
· The patient was evaluated by physical therapy and occupational therapy during the hospitalization  · The patient is unable to ambulate at this time  · The patient requires short-term rehabilitation placement upon discharge  · The patient would be an excellent candidate for [facility] placement

## 2020-01-05 NOTE — ASSESSMENT & PLAN NOTE
· Probable undiagnosed obesity-hypoventilation syndrome and severe obstructive sleep apnea  · Trial Bipap QHS and anytime while sleeping for a goal oxygen saturation of 90% and above  · Utilize supplemental oxygen via the nasal cannula to maintain oxygen saturation levels at 90% and above  · She will need an outpatient sleep study completed as soon as possible to check for obstructive sleep apnea  · Consult Pulmonology when available  · Outpatient Bariatric Surgery evaluation    Results for [facility] (MRN [id number]) as of 1/4/2020 13:47   Ref   Range 1/4/2020 10:09   pH, Arterial Latest Ref Range: 7 350 - 7 450  7 344 (L)   pCO2, Arterial Latest Ref Range: 36 0 - 44 0 mm Hg 70 3 (HH)   pO2, Arterial Latest Ref Range: 75 0 - 129 0 mm Hg 81 3   HCO3, Arterial Latest Ref Range: 22 0 - 28 0 mmol/L 37 4 (H)   Base Excess, Arterial Latest Units: mmol/L 9 6   O2 Content, Arterial Latest Ref Range: 16 0 - 23 0 mL/dL 14 7 (L)   O2 HGB,Arterial Latest Ref Range: 94 0 - 97 0 % 94 2   ABG SOURCE Unknown Radial, Left   NASAL CANNULA Unknown 3L

## 2020-01-05 NOTE — ASSESSMENT & PLAN NOTE
· Improved  · Avoid all hepatotoxic agents  · Follow the LFT's (liver function tests)    Results for [facility] (MRN [id number]) as of 1/2/2020 14:45   Ref   Range 1/1/2020 06:22   HEPATITIS A IGM ANTIBODY Latest Ref Range: Non-reactive, Equivocal-Suggest Recollect  Non-reactive   HEPATITIS B SURFACE ANTIGEN Latest Ref Range: Non-reactive, NonReactive - Confirmed  Non-reactive   HEPATITIS B CORE IGM ANTIBODY Latest Ref Range: Non-reactive  Non-reactive   HEPATITIS C ANTIBODY Latest Ref Range: Non-reactive  Non-reactive

## 2020-01-05 NOTE — ASSESSMENT & PLAN NOTE
· Continue IV vancomycin for now to complete a 10-day antibiotic course  · The IV zosyn was discontinued on 01/03/2020  · PT/OT    MRSA culture [[id number]] (Normal) Collected: 12/31/19 1925   Lab Status: Final result Specimen: Nares from Nose Updated: 01/02/20 1241    MRSA Culture Only No Methicillin Resistant Staphlyococcus aureus (MRSA) isolated

## 2020-01-05 NOTE — PROGRESS NOTES
Vancomycin IV Pharmacy-to-Dose Consultation    Vikki Bryant is a 61 y o  female who is currently receiving Vancomycin IV with management by the Pharmacy Consult service  Assessment/Plan:  The patient was reviewed  Renal function is stable and no signs or symptoms of nephrotoxicity and/or infusion reactions were documented in the chart  Based on todays assessment, continue current vancomycin (day # 9) dosing of 2000 mg iv q 24hrs, with a plan for trough to be drawn at 2130 on 1/6/20  We will continue to follow the patients culture results and clinical progress daily      Chai Quiroz, Pharmacist

## 2020-01-05 NOTE — ASSESSMENT & PLAN NOTE
· With folic acid deficiency  · Give folic acid 1 mg IV x 1 dose on 01/05/2020, and continue folic acid 1 mg PO Qdaily  · Follow the CBC  · Transfuse for a hemoglobin less than 7 g/dl    Results for [facility] (MRN [id number]) as of 1/5/2020 10:22   Ref   Range 1/4/2020 04:55   Iron Latest Ref Range: 50 - 170 ug/dL 52   Ferritin Latest Ref Range: 8 - 388 ng/mL 54   Iron Saturation Latest Units: % 18   TIBC Latest Ref Range: 250 - 450 ug/dL 295   Folate Latest Ref Range: 3 1 - 17 5 ng/mL 2 4 (L)   Vitamin B-12 Latest Ref Range: 100 - 900 pg/mL 1,050 (H)

## 2020-01-06 LAB
ALBUMIN SERPL BCP-MCNC: 1.7 G/DL (ref 3.5–5)
ALP SERPL-CCNC: 60 U/L (ref 46–116)
ALT SERPL W P-5'-P-CCNC: 15 U/L (ref 12–78)
ANION GAP SERPL CALCULATED.3IONS-SCNC: 2 MMOL/L (ref 4–13)
AST SERPL W P-5'-P-CCNC: 27 U/L (ref 5–45)
BASOPHILS # BLD AUTO: 0.05 THOUSANDS/ΜL (ref 0–0.1)
BASOPHILS NFR BLD AUTO: 1 % (ref 0–1)
BILIRUB SERPL-MCNC: 0.58 MG/DL (ref 0.2–1)
BUN SERPL-MCNC: 5 MG/DL (ref 5–25)
CALCIUM SERPL-MCNC: 8 MG/DL (ref 8.3–10.1)
CHLORIDE SERPL-SCNC: 104 MMOL/L (ref 100–108)
CO2 SERPL-SCNC: 36 MMOL/L (ref 21–32)
CREAT SERPL-MCNC: 0.8 MG/DL (ref 0.6–1.3)
EOSINOPHIL # BLD AUTO: 0.8 THOUSAND/ΜL (ref 0–0.61)
EOSINOPHIL NFR BLD AUTO: 12 % (ref 0–6)
ERYTHROCYTE [DISTWIDTH] IN BLOOD BY AUTOMATED COUNT: 15.7 % (ref 11.6–15.1)
GFR SERPL CREATININE-BSD FRML MDRD: 81 ML/MIN/1.73SQ M
GLUCOSE SERPL-MCNC: 85 MG/DL (ref 65–140)
HCT VFR BLD AUTO: 36.1 % (ref 34.8–46.1)
HGB BLD-MCNC: 10.1 G/DL (ref 11.5–15.4)
IMM GRANULOCYTES # BLD AUTO: 0.02 THOUSAND/UL (ref 0–0.2)
IMM GRANULOCYTES NFR BLD AUTO: 0 % (ref 0–2)
LYMPHOCYTES # BLD AUTO: 1.03 THOUSANDS/ΜL (ref 0.6–4.47)
LYMPHOCYTES NFR BLD AUTO: 15 % (ref 14–44)
MAGNESIUM SERPL-MCNC: 1.9 MG/DL (ref 1.6–2.6)
MCH RBC QN AUTO: 24.3 PG (ref 26.8–34.3)
MCHC RBC AUTO-ENTMCNC: 28 G/DL (ref 31.4–37.4)
MCV RBC AUTO: 87 FL (ref 82–98)
MONOCYTES # BLD AUTO: 0.67 THOUSAND/ΜL (ref 0.17–1.22)
MONOCYTES NFR BLD AUTO: 10 % (ref 4–12)
NEUTROPHILS # BLD AUTO: 4.14 THOUSANDS/ΜL (ref 1.85–7.62)
NEUTS SEG NFR BLD AUTO: 62 % (ref 43–75)
NRBC BLD AUTO-RTO: 0 /100 WBCS
PLATELET # BLD AUTO: 298 THOUSANDS/UL (ref 149–390)
PMV BLD AUTO: 10 FL (ref 8.9–12.7)
POTASSIUM SERPL-SCNC: 4.4 MMOL/L (ref 3.5–5.3)
PROCALCITONIN SERPL-MCNC: 0.13 NG/ML
PROT SERPL-MCNC: 6.3 G/DL (ref 6.4–8.2)
RBC # BLD AUTO: 4.15 MILLION/UL (ref 3.81–5.12)
SODIUM SERPL-SCNC: 142 MMOL/L (ref 136–145)
WBC # BLD AUTO: 6.71 THOUSAND/UL (ref 4.31–10.16)

## 2020-01-06 PROCEDURE — 99232 SBSQ HOSP IP/OBS MODERATE 35: CPT | Performed by: INTERNAL MEDICINE

## 2020-01-06 PROCEDURE — 85025 COMPLETE CBC W/AUTO DIFF WBC: CPT | Performed by: INTERNAL MEDICINE

## 2020-01-06 PROCEDURE — 94760 N-INVAS EAR/PLS OXIMETRY 1: CPT

## 2020-01-06 PROCEDURE — 80053 COMPREHEN METABOLIC PANEL: CPT | Performed by: INTERNAL MEDICINE

## 2020-01-06 PROCEDURE — 84145 PROCALCITONIN (PCT): CPT | Performed by: INTERNAL MEDICINE

## 2020-01-06 PROCEDURE — 83735 ASSAY OF MAGNESIUM: CPT | Performed by: INTERNAL MEDICINE

## 2020-01-06 PROCEDURE — 94660 CPAP INITIATION&MGMT: CPT

## 2020-01-06 RX ADMIN — FLUOXETINE 20 MG: 20 CAPSULE ORAL at 09:51

## 2020-01-06 RX ADMIN — NYSTATIN: 100000 POWDER TOPICAL at 09:52

## 2020-01-06 RX ADMIN — ONDANSETRON 4 MG: 2 INJECTION INTRAMUSCULAR; INTRAVENOUS at 13:40

## 2020-01-06 RX ADMIN — Medication: at 09:51

## 2020-01-06 RX ADMIN — LEVOTHYROXINE SODIUM 125 MCG: 125 TABLET ORAL at 05:46

## 2020-01-06 RX ADMIN — MELATONIN 1000 UNITS: at 09:51

## 2020-01-06 RX ADMIN — ENOXAPARIN SODIUM 60 MG: 60 INJECTION SUBCUTANEOUS at 20:08

## 2020-01-06 RX ADMIN — NYSTATIN 1 APPLICATION: 100000 POWDER TOPICAL at 19:00

## 2020-01-06 RX ADMIN — FOLIC ACID 1 MG: 1 TABLET ORAL at 09:51

## 2020-01-06 RX ADMIN — Medication: at 19:00

## 2020-01-06 RX ADMIN — ENOXAPARIN SODIUM 60 MG: 60 INJECTION SUBCUTANEOUS at 09:51

## 2020-01-06 NOTE — SOCIAL WORK
Pt was discussed during care coordination rounds, pt is not ready for d/c today  Pt to be seen by Pulmonologist tomorrow  Sayra spoke with Amberly Mary from [place] [address]  who sts they are unable to accept pt at this time, as they deem pt is not appropriate for their facility at this time, sts majority of her conditions "seem chronic", ie wounds and also "she has not had an ICU stay "      SAYRA left message for [facility] in [place] to follow up on referral placed  SAYRA spoke with Charis Ellis from [facility] ([phone]), who requested clinical be faxed to [phone]    CM faxed all clinical

## 2020-01-06 NOTE — ASSESSMENT & PLAN NOTE
· Initially treated with IV vancomycin and IV zosyn  · Completed a 5-day course of IV zosyn  · Completed an 8-day course of IV vancomycin  · PT/OT    MRSA culture [[id number]] (Normal) Collected: 12/31/19 1925   Lab Status: Final result Specimen: Nares from Nose Updated: 01/02/20 1241    MRSA Culture Only No Methicillin Resistant Staphlyococcus aureus (MRSA) isolated

## 2020-01-06 NOTE — WOUND OSTOMY CARE
Progress Note - Wound   Zully Raleigh 61 y o  female MRN: [id number]  Unit/Bed#: -Reyes Encounter: [id number]        Assessment:     Follow up for bilateral leg wounds, MASD of right abdomen and right breast  Patient is alert and oriented, turn assist of 3-4 and has Purewick in place  Findings    1  Right medial distal thigh: wound depth is decreasing  Currently packing daily with 1/4" iodoform ribbon  2  Right lower extremity medial: three open areas of full thickness wounds  Thick scabbing and dry tissue has decreased significantly and wounds are more moist at this time  3  Right lower extremity anterior: small full thickness wound  Dry tissue and scabbing has decreased and wound is more moist     4  Right breast: area has resolved  Skin is pink and slightly moist but largely controlled now with Nystatin powder  5  Right abdomen: MASD wound appears to be improving with new areas of epithelialization present  6  Sacrum: new small area of moisture associated skin damage that is partial thickness and moist      7  Bilateral buttocks: blanchable pink moist and intact  Plan: Will switch Silvasorb gel to Maxorb Ag  No other changes to wound care treatment at this time  Wound 12/28/19 Venous stasis ulcer Leg Right; Lower;Medial (Active)   Wound Image   1/6/2020  9:09 AM   Wound Description Beefy red;Slough;Edema;Drainage 1/6/2020  9:09 AM   Edel-wound Assessment Erythema;Edema 1/6/2020  9:09 AM   Wound Length (cm) 6 5 cm 1/6/2020  9:09 AM   Wound Width (cm) 7 cm 1/6/2020  9:09 AM   Wound Depth (cm) 0 2 1/6/2020  9:09 AM   Calculated Wound Area (cm^2) 45 5 cm^2 1/6/2020  9:09 AM   Calculated Wound Volume (cm^3) 9 1 cm^3 1/6/2020  9:09 AM   Drainage Amount Moderate 1/6/2020  9:09 AM   Drainage Description Serosanguineous 1/6/2020  9:09 AM   Non-staged Wound Description Full thickness 1/6/2020  9:09 AM   Treatments Irrigation with NSS 1/6/2020  9:09 AM   Dressing Calcium Alginate with Silver;ABD;Gauze 1/6/2020  9:09 AM   Wound packed? No 1/5/2020 11:35 AM   Dressing Changed New 1/6/2020  9:09 AM   Patient Tolerance Tolerated well 1/6/2020  9:09 AM   Dressing Status Clean;Dry; Intact 1/6/2020  9:09 AM       Wound 12/28/19 Leg Right; Lower (Active)   Wound Image   1/6/2020  9:10 AM   Wound Description Beefy red;Yellow;Slough 1/6/2020  9:10 AM   Edel-wound Assessment Fragile;Erythema 1/6/2020  9:10 AM   Wound Length (cm) 2 cm 1/6/2020  9:10 AM   Wound Width (cm) 1 cm 1/6/2020  9:10 AM   Wound Depth (cm) 0 2 1/6/2020  9:10 AM   Calculated Wound Area (cm^2) 2 cm^2 1/6/2020  9:10 AM   Calculated Wound Volume (cm^3) 0 4 cm^3 1/6/2020  9:10 AM   Change in Wound Size % 97 78 1/6/2020  9:10 AM   Drainage Amount Moderate 1/6/2020  9:10 AM   Drainage Description Yellow;Serosanguineous 1/6/2020  9:10 AM   Non-staged Wound Description Full thickness 1/6/2020  9:10 AM   Treatments Irrigation with NSS 1/6/2020  9:10 AM   Dressing Calcium Alginate with Silver;Gauze;ABD 1/6/2020  9:10 AM   Wound packed? No 1/5/2020 11:35 AM   Dressing Changed New 1/6/2020  9:10 AM   Patient Tolerance Tolerated well 1/6/2020  9:10 AM   Dressing Status Clean; Intact 1/6/2020  9:10 AM       Wound 12/28/19 Leg Left; Lower (Active)   Wound Image   1/6/2020  9:22 AM   Wound Description Beefy red;Drainage;Yellow 1/6/2020  9:22 AM   Edel-wound Assessment Erythema;Edema 1/6/2020  9:22 AM   Wound Length (cm) 3 5 cm 1/6/2020  9:22 AM   Wound Width (cm) 4 cm 1/6/2020  9:22 AM   Wound Depth (cm) 0 2 1/6/2020  9:22 AM   Calculated Wound Area (cm^2) 14 cm^2 1/6/2020  9:22 AM   Calculated Wound Volume (cm^3) 2 8 cm^3 1/6/2020  9:22 AM   Drainage Amount Moderate 1/6/2020  9:22 AM   Drainage Description Serosanguineous 1/6/2020  9:22 AM   Non-staged Wound Description Full thickness 1/6/2020  9:22 AM   Treatments Irrigation with NSS 1/6/2020  9:22 AM   Dressing Calcium Alginate with Silver;ABD;Gauze 1/6/2020  9:22 AM   Dressing Changed New 1/6/2020 9:22 AM   Patient Tolerance Tolerated well 1/6/2020  9:22 AM   Dressing Status Clean;Dry; Intact 1/6/2020  9:22 AM       Wound 12/28/19 Venous stasis ulcer Leg Right; Inner; Other (Comment); Medial (Active)   Wound Image   12/30/2019  8:36 AM   Wound Description Epithelialization;Pink 1/6/2020  9:04 AM   Edel-wound Assessment Induration;Erythema;Edema 1/6/2020  9:04 AM   Wound Length (cm) 0 3 cm 1/6/2020  9:04 AM   Wound Width (cm) 0 3 cm 1/6/2020  9:04 AM   Wound Depth (cm) 2 1/6/2020  9:04 AM   Calculated Wound Area (cm^2) 0 09 cm^2 1/6/2020  9:04 AM   Calculated Wound Volume (cm^3) 0 18 cm^3 1/6/2020  9:04 AM   Change in Wound Size % 92 44 1/6/2020  9:04 AM   Tunneling 2 7 cm 12/29/2019  5:00 PM   Tunneling in depth located at 12 to 11 12/29/2019  5:00 PM   Drainage Amount Moderate 1/6/2020  9:04 AM   Drainage Description Serosanguineous 1/6/2020  9:04 AM   Non-staged Wound Description Full thickness 1/6/2020  9:04 AM   Dressing Packings 1/6/2020  9:04 AM   Wound packed? Yes 1/6/2020  9:04 AM   Packing- # removed 1 1/6/2020  9:04 AM   Packing- # inserted 1 1/6/2020  9:04 AM   Dressing Changed New 1/6/2020  9:04 AM   Patient Tolerance Tolerated well 1/6/2020  9:04 AM   Dressing Status Clean;Dry; Intact 1/6/2020  9:04 AM       Wound 12/30/19 Moisture associated skin damage Abdomen Anterior;Right (Active)   Wound Image   12/30/2019  8:23 AM   Wound Description Beefy red;Epithelialization 1/6/2020  9:00 AM   Edel-wound Assessment Edema; Erythema 1/6/2020  9:00 AM   Wound Length (cm) 1 5 cm 1/6/2020  9:00 AM   Wound Width (cm) 0 5 cm 1/6/2020  9:00 AM   Wound Depth (cm) 0 1 1/6/2020  9:00 AM   Calculated Wound Area (cm^2) 0 75 cm^2 1/6/2020  9:00 AM   Calculated Wound Volume (cm^3) 0 08 cm^3 1/6/2020  9:00 AM   Change in Wound Size % 60 1/6/2020  9:00 AM   Drainage Amount Small 1/6/2020  9:00 AM   Drainage Description Serous 1/6/2020  9:00 AM   Non-staged Wound Description Partial thickness 1/6/2020  9:00 AM   Treatments Cleansed 1/6/2020  9:00 AM   Dressing Other (Comment) 1/4/2020 11:00 PM   Dressing Changed New 1/6/2020  9:00 AM   Patient Tolerance Tolerated well 1/6/2020  9:00 AM   Dressing Status Clean;Dry; Intact 1/6/2020  9:00 AM     Would recommend that patient be seen at an outpatient wound management center for continued treatment of bilateral lower extremity wounds  Wound Care will continue to follow  Call or Tiger text with any questions

## 2020-01-06 NOTE — ASSESSMENT & PLAN NOTE
· Avoid all QT-prolonging agents  · Follow the QT interval  · Recheck an EKG on 01/07/2020  · Outpatient Cardiology evaluation

## 2020-01-06 NOTE — ASSESSMENT & PLAN NOTE
· Likely secondary to acute infection  · Outpatient Cardiology evaluation    Echo complete with contrast if indicated   Status: Final result   PACS Images      Show images for Echo complete with contrast if indicated   Study Result     [address]  [place], [address] [place] [address]     Transthoracic Echocardiogram  2D, M-mode, Doppler, and Color Doppler     Study date:  2020     Patient: Sophy Carpenter  MR number: [id number]  Account number: [de-identified]  : 1960  Age: 61 years  Gender: Female  Status: Inpatient  Location: [facility]  Height: 63 in  Weight: 426 lb  BP: 134/ 66 mmHg     Indications: MI     Diagnoses: I21 4 - Non-ST elevation (NSTEMI) myocardial infarction     Sonographer:  HAILEY Quarles  Primary Physician:  Nae House DO  Referring Physician:  Tomeka Eckert DO  Group:  [facility]  Interpreting Physician:  Sp Watkins DO     IMPRESSIONS:  Technically difficult study     SUMMARY     LEFT VENTRICLE:  Difficult to assess complete regional wal motion  Mildly abnormal septal motion, more consistant with conduction delay  Size was normal   Systolic function was normal  Ejection fraction was estimated in the range of 50 % to 55 %  This study was inadequate for the evaluation of regional wall motion  Wall thickness was mildly increased      TRICUSPID VALVE:  There was trace regurgitation      HISTORY: PRIOR HISTORY: Elevated troponins, morbid obesity     PROCEDURE: The study was performed in the [facility]  This was a routine study  The transthoracic approach was used  The study included complete 2D imaging, M-mode, complete spectral Doppler, and color Doppler  The heart  rate was 87 bpm, at the start of the study  Images were obtained from the parasternal, apical, subcostal, and suprasternal notch acoustic windows   Intravenous contrast ( 1 0 ml of Definity) was administered to opacify the left ventricle  Echocardiographic views were limited due to restricted patient mobility, poor patient compliance, poor acoustic window availability, and decreased penetration  This was a technically difficult study      LEFT VENTRICLE: Difficult to assess complete regional wal motion  Mildly abnormal septal motion, more consistant with conduction delay  Size was normal  Systolic function was normal  Ejection fraction was estimated in the range of 50 % to  55 %  This study was inadequate for the evaluation of regional wall motion  Wall thickness was mildly increased  DOPPLER: Left ventricular diastolic function parameters were normal for the patient's age      RIGHT VENTRICLE: The size was normal  Systolic function was normal      LEFT ATRIUM: Size was normal      RIGHT ATRIUM: Size was normal      MITRAL VALVE: Valve structure was normal  DOPPLER: There was no significant regurgitation      AORTIC VALVE: The valve was probably trileaflet  DOPPLER: There was no significant regurgitation      TRICUSPID VALVE: The valve structure was normal  DOPPLER: There was trace regurgitation      PULMONIC VALVE: DOPPLER: There was no evidence for stenosis  There was no regurgitation      PERICARDIUM: There was no pericardial effusion      AORTA: The root exhibited normal size      SYSTEM MEASUREMENT TABLES     2D  %FS: 29 38 %  AV Diam: 2 58 cm  EDV(Teich): 72 64 ml  EF(Teich): 56 81 %  ESV(Teich): 31 37 ml  IVSd: 1 35 cm  LVEDV MOD A4C: 97 9 ml  LVEF MOD A4C: 65 45 %  LVESV MOD A4C: 33 83 ml  LVIDd: 4 06 cm  LVIDs: 2 87 cm  LVLd A4C: 8 48 cm  LVLs A4C: 6 48 cm  LVPWd: 1 06 cm  RWT: 0 52  SV MOD A4C: 64 08 ml  SV(Teich): 41 27 ml     PW  E': 0 14 m/s  E/E': 7 27  MV A Philip: 0 93 m/s  MV Dec Pinal: 11 15 m/s2  MV DecT: 92 15 ms  MV E Philip: 1 03 m/s  MV E/A Ratio: 1 11     [employer] Accredited Echocardiography Laboratory     Prepared and electronically signed by  Merlin Clemente DO  Signed 03-Jan-2020 10:27:55

## 2020-01-06 NOTE — ASSESSMENT & PLAN NOTE
· The patient was evaluated by physical therapy and occupational therapy during the hospitalization  · The patient is unable to ambulate at this time  · The patient requires short-term rehabilitation placement upon discharge  · The patient would be an excellent candidate for [facility] placement

## 2020-01-06 NOTE — ASSESSMENT & PLAN NOTE
· With folic acid deficiency  · Received folic acid 1 mg IV x 1 dose on 01/05/2020, and continue folic acid 1 mg PO Qdaily  · Follow the CBC  · Transfuse for a hemoglobin less than 7 g/dl    Results for [facility] (MRN [id number]) as of 1/5/2020 10:22   Ref   Range 1/4/2020 04:55   Iron Latest Ref Range: 50 - 170 ug/dL 52   Ferritin Latest Ref Range: 8 - 388 ng/mL 54   Iron Saturation Latest Units: % 18   TIBC Latest Ref Range: 250 - 450 ug/dL 295   Folate Latest Ref Range: 3 1 - 17 5 ng/mL 2 4 (L)   Vitamin B-12 Latest Ref Range: 100 - 900 pg/mL 1,050 (H)

## 2020-01-06 NOTE — UTILIZATION REVIEW
Continued Stay Review    Date: 1/5/20                        Current Patient Class: Inpatient Current Level of Care: Med/surg    HPI:59 y o  female initially admitted on 1/1  Assessment/Plan:   Continue IV abx for cellulitis  PT/OT  Probably undiagnosed obesity-hypoventilation syndrome and severe obstructive sleep apnea  Use supplemental oxygen via nasal cannula to keep pulse ox at 90% or >   Consult pulm  Lungs clear  She is unable to ambulate at this time  STR upon dc  Avoid QT prolonging agents  Pertinent Labs/Diagnostic Results:   Xray left foot 1/2;  No acute osseous abnormality  Xray tib/fib left 1/2: No acute osseous abnormality    Results from last 7 days   Lab Units 01/05/20 0529 01/04/20 0455 01/03/20  0513 01/02/20  0505   WBC Thousand/uL 5 32 4 68 5 98 6 40   HEMOGLOBIN g/dL 10 1* 9 6* 9 3* 10 3*   HEMATOCRIT % 36 7 36 1 34 9 39 1   PLATELETS Thousands/uL 281 278 264 298   NEUTROS ABS Thousands/µL 2 90 2 53 3 90 4 40         Results from last 7 days   Lab Units 01/05/20  0529 01/04/20 0455 01/03/20  0513 01/02/20  0505 01/01/20  0622   SODIUM mmol/L 143 141 141 142 141   POTASSIUM mmol/L 3 8 3 5 3 7 4 0 3 7   CHLORIDE mmol/L 105 104 104 104 104   CO2 mmol/L 38* 37* 36* 39* 34*   ANION GAP mmol/L 0* 0* 1* -1* 3*   BUN mg/dL 7 8 10 11 11   CREATININE mg/dL 0 89 0 82 0 92 0 99 0 97   EGFR ml/min/1 73sq m 71 79 68 63 64   CALCIUM mg/dL 8 0* 7 6* 7 6* 7 9* 7 8*   CALCIUM, IONIZED mmol/L  --   --   --  1 13  --    MAGNESIUM mg/dL 1 9 2 0 2 0 2 2 2 2   PHOSPHORUS mg/dL 3 0 2 9 2 6* 3 1 3 1     Results from last 7 days   Lab Units 01/05/20  0529 01/04/20  0455 01/03/20  0513 01/02/20  0505   AST U/L 15 18 20 26   ALT U/L 20 24 29 41   ALK PHOS U/L 61 63 62 74   TOTAL PROTEIN g/dL 6 4 6 3* 6 1* 6 9   ALBUMIN g/dL 1 8* 1 8* 1 7* 2 0*   TOTAL BILIRUBIN mg/dL 0 41 0 47 0 48 0 61         Results from last 7 days   Lab Units 01/06/20  0540 01/05/20  0529 01/04/20  0455 01/03/20  0513 01/02/20  0505 01/01/20  0622 12/31/19  0721   GLUCOSE RANDOM mg/dL 85 97 90 85 93 81 84     Results from last 7 days   Lab Units 01/04/20  1009   PH ART  7 344*   PCO2 ART mm Hg 70 3*   PO2 ART mm Hg 81 3   HCO3 ART mmol/L 37 4*   BASE EXC ART mmol/L 9 6   O2 CONTENT ART mL/dL 14 7*   O2 HGB, ARTERIAL % 94 2   ABG SOURCE  Radial, Left     Results from last 7 days   Lab Units 01/04/20  0455 01/01/20  0622   CK TOTAL U/L 21* 40     Results from last 7 days   Lab Units 01/01/20  0622   TROPONIN I ng/mL <0 02       Results from last 7 days   Lab Units 01/03/20  0513 01/02/20  0505 01/01/20  0623   PROCALCITONIN ng/ml 0 11 0 11 0 11     Results from last 7 days   Lab Units 01/01/20  0622   LACTIC ACID mmol/L 0 9       Results from last 7 days   Lab Units 01/04/20  0455   FERRITIN ng/mL 54     Results from last 7 days   Lab Units 01/01/20  0622   HEP B S AG  Non-reactive   HEP C AB  Non-reactive   HEP B C IGM  Non-reactive       Vital Signs:   Date/Time  Temp  Pulse  Resp  BP  MAP (mmHg)  SpO2  O2 Device   01/05/20 22:34:30  97 2 °F (36 2 °C)Abnormal   77  20  125/53  77  99 %     01/05/20 15:35:10  97 8 °F (36 6 °C)  85  18  121/58  79  96 %     01/05/20 0915    80  20      100 %     01/05/20 07:30:40  98 1 °F (36 7 °C)  77  20  118/60  79  97 %     01/05/20 01:02:52  98 °F (36 7 °C)  78  19  139/73  95  98 %     01/04/20 2300              Nasal cannula 3LNC   01/04/20 2119    85  18      95 %     01/04/20 15:15:06  97 4 °F (36 3 °C)Abnormal   79  18  114/54  74  95 %     01/04/20 1100              Nasal cannula   01/04/20 07:25:11  97 7 °F (36 5 °C)  82  18  127/60  82  97 %     01/04/20 0100                   Medications:   Scheduled Medications:    Medications:  ammonium lactate  Topical BID   cholecalciferol 1,000 Units Oral Daily   enoxaparin 60 mg Subcutaneous Q12H ZOË   FLUoxetine 20 mg Oral Daily   folic acid 1 mg Oral Daily   levothyroxine 125 mcg Oral Early Morning   nystatin  Topical BID vancomycin 2,000 mg Intravenous Q24H     Continuous IV Infusions:     PRN Meds:    acetaminophen 650 mg Oral Q6H PRN   albuterol 2 5 mg Nebulization Q4H PRN   ondansetron 4 mg Intravenous Q8H PRN       Discharge Plan: TBD  Network Utilization Review Department  [email or web address]  ATTENTION: Please call with any questions or concerns to [phone] and carefully listen to the prompts so that you are directed to the right person  All voicemails are confidential   Stephanie Dudley all requests for admission clinical reviews, approved or denied determinations and any other requests to dedicated fax number below belonging to the campus where the patient is receiving treatment   List of dedicated fax numbers for the Facilities:  [address] DENIALS (Administrative/Medical Necessity) [phone]   [address] (Maternity/NICU/Pediatrics) [phone]   Jorge Luis Kolb [phone]   Gini Caller [phone]   Dolores Beltran [phone]   Ashlee Lowry [phone]   [address] [place] [address] [phone]   [facility]  [phone]   [address] [facility]  [address] [facility] [address] [facility] [phone]

## 2020-01-06 NOTE — DISCHARGE INSTR - OTHER ORDERS
Wound Care    Bilateral lower legs: wash legs with soap and water and a towel avoiding wounds to remove dead skin build up  Irrigate wounds with normal saline and pat dry  Cut Maxorb Ag to fit size of wound then place directly on wound bed  Cover with 4x4 gauze and an ABD then secure with Kerlix and paper tape  Change once daily or as needed for strike through drainage  Right medial thigh: Apply skin prep to periwound and allow to dry  Loosely tuck 1/4" Iodoform packing strip into wound and use a small piece of tape to secure tail to skin  Cover with Allevyn non bordered foam and change daily or as needed for strike through drainage  Right posterior knee: Wash with soap and water then pat dry  Apply Maxorb Ag Rope to to inner aspect of knee, Change daily or as needed for strikethrough drainage  Right Lower Abdomen: Irrigate with normal saline then pat dry  Line underside of pannus with Maxorb Ag Rope and change daily  Wash skin folds with soap and water then pat dry  Dust skin folds underneath breasts, bilateral groin and torso with Nystatin powder three times daily  Apply Lac-Hydrin (ammonium lactate) lotion to bilateral legs avoiding wound base one to two times a day  Skin care Plan:  1-Protect sacrum w/Allevyn foam, luis enrique with P, change q3d and PRN, check skin q-shift  2-Turn/reposition q2h or when medically stable for pressure re-distribution on skin   3-Elevate heels to offload pressure]  4-Moisturize skin daily with skin nourishing cream  5-Ehob cushion in chair when out of bed    6-Hydraguard to bilateral heels BID and PRN      Recommend to be seen by outpatient wound management center for ongoing treatment of bilateral lower extremity wounds

## 2020-01-06 NOTE — SOCIAL WORK
SAYRA spoke with pts ,Pasquale, at length to discuss discharge planning  CM educated pts  on the difference between STR and LTACH, pts  verbalizes understanding  Pts  sts he plans to visit pt tomorrow approx 1000, at which time he would like to meet with CM and MD, if available, attending notified

## 2020-01-06 NOTE — PROGRESS NOTES
Progress Note - Renny Mitchell 1960, 61 y o  female MRN: [id number]    Unit/Bed#: -01 Encounter: [id number]    Primary Care Provider: Marianna Lehman DO   Date and time admitted to hospital: 12/28/2019 12:14 PM        * Cellulitis of right lower extremity  Assessment & Plan  · Initially treated with IV vancomycin and IV zosyn  · Completed a 5-day course of IV zosyn  · Completed an 8-day course of IV vancomycin  · PT/OT    MRSA culture [[id number]] (Normal) Collected: 12/31/19 1925   Lab Status: Final result Specimen: Nares from Nose Updated: 01/02/20 1241    MRSA Culture Only No Methicillin Resistant Staphlyococcus aureus (MRSA) isolated         Acute respiratory failure with hypoxia and hypercapnia (HCC)  Assessment & Plan  · Probable undiagnosed obesity-hypoventilation syndrome and severe obstructive sleep apnea  · Trial Bipap QHS and anytime while sleeping for a goal oxygen saturation of 90% and above  · Utilize supplemental oxygen via the nasal cannula to maintain oxygen saturation levels at 90% and above  · She will need an outpatient sleep study completed as soon as possible to check for obstructive sleep apnea  · Consult Pulmonology, who will see the patient on 01/07/2020  · Outpatient Bariatric Surgery evaluation    Results for Norrsi Meehan (MRN [id number]) as of 1/4/2020 13:47   Ref   Range 1/4/2020 10:09   pH, Arterial Latest Ref Range: 7 350 - 7 450  7 344 (L)   pCO2, Arterial Latest Ref Range: 36 0 - 44 0 mm Hg 70 3 (HH)   pO2, Arterial Latest Ref Range: 75 0 - 129 0 mm Hg 81 3   HCO3, Arterial Latest Ref Range: 22 0 - 28 0 mmol/L 37 4 (H)   Base Excess, Arterial Latest Units: mmol/L 9 6   O2 Content, Arterial Latest Ref Range: 16 0 - 23 0 mL/dL 14 7 (L)   O2 HGB,Arterial Latest Ref Range: 94 0 - 97 0 % 94 2   ABG SOURCE Unknown Radial, Left   NASAL CANNULA Unknown 3L       Acute metabolic encephalopathy  Assessment & Plan  · Present on admission  · Likely secondary to acute infection and secondary to hypoxia as well as hypercapnia  · The patient's neurologic status has improved    Ambulatory dysfunction  Assessment & Plan  · The patient was evaluated by physical therapy and occupational therapy during the hospitalization  · The patient is unable to ambulate at this time  · The patient requires short-term rehabilitation placement upon discharge  · The patient would be an excellent candidate for LTACH placement    Folic acid deficiency  Assessment & Plan  · Received folic acid 1 mg IV x 1 dose on 01/05/2020, and continue folic acid 1 mg PO Qdaily    Prolonged QT interval  Assessment & Plan  · Avoid all QT-prolonging agents  · Follow the QT interval  · Recheck an EKG on 01/07/2020  · Outpatient Cardiology evaluation    Hypophosphatemia  Assessment & Plan  · Resolved with PO phosphorus supplementation  · Follow the phosphorus level    Dysphagia  Assessment & Plan  · The patient was evaluated by speech therapy  · Continue a regular diet with thin liquids per speech therapy's recommendations  · Aspiration precautions at all times    Anemia  Assessment & Plan  · With folic acid deficiency  · Received folic acid 1 mg IV x 1 dose on 01/05/2020, and continue folic acid 1 mg PO Qdaily  · Follow the CBC  · Transfuse for a hemoglobin less than 7 g/dl    Results for [facility] (MRN [id number]) as of 1/5/2020 10:22   Ref   Range 1/4/2020 04:55   Iron Latest Ref Range: 50 - 170 ug/dL 52   Ferritin Latest Ref Range: 8 - 388 ng/mL 54   Iron Saturation Latest Units: % 18   TIBC Latest Ref Range: 250 - 450 ug/dL 295   Folate Latest Ref Range: 3 1 - 17 5 ng/mL 2 4 (L)   Vitamin B-12 Latest Ref Range: 100 - 900 pg/mL 1,050 (H)       Acquired hypothyroidism  Assessment & Plan  · Continue her home dose of PO levothyroxine  Outpatient follow-up with PCP in regards to this matter      Left foot pain  Assessment & Plan  · Secondary to fall during the hospitalization    XR foot 2 vw left   Status: Final result   PACS Images      Show images for XR foot 2 vw left   Study Result     LEFT FOOT     INDICATION:   left foot pain, trauma, can be done portable      COMPARISON:  None     VIEWS:  XR FOOT 2 VW LEFT         FINDINGS:     There is no acute fracture or dislocation      No significant degenerative changes      No lytic or blastic lesions seen      Soft tissues are unremarkable      IMPRESSION:     No acute osseous abnormality         XR tibia fibula 2 vw left   Status: Final result   PACS Images      Show images for XR tibia fibula 2 vw left   Study Result     LEFT TIBIA AND FIBULA     INDICATION:   left ankle pain, can be done portable      COMPARISON:  None     VIEWS:  XR TIBIA FIBULA 2 VW LEFT         FINDINGS:     There is no acute fracture or dislocation      Advanced knee joint osteoarthritis is present      No lytic or blastic lesions are seen      Soft tissues are unremarkable      IMPRESSION:     No acute osseous abnormality             Elevated troponin level  Assessment & Plan  · Likely secondary to acute infection  · Outpatient Cardiology evaluation    Echo complete with contrast if indicated   Status: Final result   PACS Images      Show images for Echo complete with contrast if indicated   Study Result     [facility], [address] [place] [address]     Transthoracic Echocardiogram  2D, M-mode, Doppler, and Color Doppler     Study date:  2020     [facility]  MR number: [id number]  Account number: [de-identified]  : 1960  Age: 61 years  Gender: Female  Status: Inpatient  Location: [address] Echo Lab  Height: 63 in  Weight: 426 lb  BP: 134/ 66 mmHg     Indications: MI     Diagnoses: I21 4 - Non-ST elevation (NSTEMI) myocardial infarction     Sonographer:  HAILEY Rivera  Primary Physician:  Leticia Roth DO  Referring Physician:  Mariella Oneal DO  Group:  [facility]  Interpreting Physician:  Alberta Bill DO     IMPRESSIONS:  Technically difficult study     SUMMARY     LEFT VENTRICLE:  Difficult to assess complete regional wal motion  Mildly abnormal septal motion, more consistant with conduction delay  Size was normal   Systolic function was normal  Ejection fraction was estimated in the range of 50 % to 55 %  This study was inadequate for the evaluation of regional wall motion  Wall thickness was mildly increased      TRICUSPID VALVE:  There was trace regurgitation      HISTORY: PRIOR HISTORY: Elevated troponins, morbid obesity     PROCEDURE: The study was performed in the [address] [place] [address] Echo Lab  This was a routine study  The transthoracic approach was used  The study included complete 2D imaging, M-mode, complete spectral Doppler, and color Doppler  The heart  rate was 87 bpm, at the start of the study  Images were obtained from the parasternal, apical, subcostal, and suprasternal notch acoustic windows  Intravenous contrast ( 1 0 ml of Definity) was administered to opacify the left ventricle  Echocardiographic views were limited due to restricted patient mobility, poor patient compliance, poor acoustic window availability, and decreased penetration  This was a technically difficult study      LEFT VENTRICLE: Difficult to assess complete regional wal motion  Mildly abnormal septal motion, more consistant with conduction delay  Size was normal  Systolic function was normal  Ejection fraction was estimated in the range of 50 % to  55 %  This study was inadequate for the evaluation of regional wall motion  Wall thickness was mildly increased   DOPPLER: Left ventricular diastolic function parameters were normal for the patient's age      RIGHT VENTRICLE: The size was normal  Systolic function was normal      LEFT ATRIUM: Size was normal      RIGHT ATRIUM: Size was normal      MITRAL VALVE: Valve structure was normal  DOPPLER: There was no significant regurgitation      AORTIC VALVE: The valve was probably trileaflet  DOPPLER: There was no significant regurgitation      TRICUSPID VALVE: The valve structure was normal  DOPPLER: There was trace regurgitation      PULMONIC VALVE: DOPPLER: There was no evidence for stenosis  There was no regurgitation      PERICARDIUM: There was no pericardial effusion      AORTA: The root exhibited normal size      SYSTEM MEASUREMENT TABLES     2D  %FS: 29 38 %  AV Diam: 2 58 cm  EDV(Teich): 72 64 ml  EF(Teich): 56 81 %  ESV(Teich): 31 37 ml  IVSd: 1 35 cm  LVEDV MOD A4C: 97 9 ml  LVEF MOD A4C: 65 45 %  LVESV MOD A4C: 33 83 ml  LVIDd: 4 06 cm  LVIDs: 2 87 cm  LVLd A4C: 8 48 cm  LVLs A4C: 6 48 cm  LVPWd: 1 06 cm  RWT: 0 52  SV MOD A4C: 64 08 ml  SV(Teich): 41 27 ml     PW  E': 0 14 m/s  E/E': 7 27  MV A Philip: 0 93 m/s  MV Dec Juniata: 11 15 m/s2  MV DecT: 92 15 ms  MV E Philip: 1 03 m/s  MV E/A Ratio: 1 11     [facility] Accredited Echocardiography Laboratory     Prepared and electronically signed by  Marcial Willoughby DO  Signed 03-Jan-2020 10:27:55            Skin breakdown  Assessment & Plan  · The patient was evaluated by the Wound Care nurse with the following impressions/recommendations:  Wound Care     Bilateral lower legs: wash legs with soap and water and a towel avoiding wounds to remove dead skin build up  Irrigate wounds with normal saline and pat dry  Apply thick layer of Silvasorb gel directly to wound base or apply to gauze then place directly on wound  Cover with 4x4 gauze and an ABD then secure with Kerlix and paper tape  Change once daily or as needed for strike through drainage       Right medial thigh: Apply skin prep to periwound and allow to dry  Loosely tuck 1/4" Iodoform packing strip into wound and use a small piece of tape to secure tail to skin  Cover with Allevyn non bordered foam and change daily or as needed for strike through drainage       Right posterior knee: Wash with soap and water then pat dry   Apply Maxorb Ag Rope to to inner aspect of knee, Change daily or as needed for strikethrough drainage       Right Lower Abdomen: Irrigate with normal saline then pat dry  Line underside of pannus with Maxorb Ag Rope and change daily       Wash skin folds with soap and water then pat dry  Dust skin folds underneath breasts, bilateral groin and torso with Nystatin powder three times daily       Apply Lac-Hydrin (ammonium lactate) lotion to bilateral legs avoiding wound base one to two times a day      Skin care Plan:  1-Protect sacrum w/Allevyn foam, luis enrique with P, change q3d and PRN, check skin q-shift  2-Turn/reposition q2h or when medically stable for pressure re-distribution on skin   3-Elevate heels to offload pressure]  4-Moisturize skin daily with skin nourishing cream  5-Ehob cushion in chair when out of bed  6-Hydraguard to bilateral heels BID and PRN        Morbid obesity with BMI of 70 and over, adult [facility])  Assessment & Plan  · Lifestyle modifications are recommended including weight loss, improving her diet, and increasing her amount of activity  · She will need an outpatient sleep study completed as soon as possible to check for obstructive sleep apnea  · Outpatient Bariatric Surgery evaluation    Transaminitis  Assessment & Plan  · Improved  · Avoid all hepatotoxic agents  · Follow the LFT's (liver function tests)    Results for [facility] (MRN [id number]) as of 1/2/2020 14:45   Ref   Range 1/1/2020 06:22   HEPATITIS A IGM ANTIBODY Latest Ref Range: Non-reactive, Equivocal-Suggest Recollect  Non-reactive   HEPATITIS B SURFACE ANTIGEN Latest Ref Range: Non-reactive, NonReactive - Confirmed  Non-reactive   HEPATITIS B CORE IGM ANTIBODY Latest Ref Range: Non-reactive  Non-reactive   HEPATITIS C ANTIBODY Latest Ref Range: Non-reactive  Non-reactive       Lymphedema  Assessment & Plan  · Chronic lymphedema of the bilateral lower extremities  · Outpatient lymphedema therapy      VTE Pharmacologic Prophylaxis:   Pharmacologic: Enoxaparin (Lovenox) 60 mg SQ every 12 hours (Dose based on her BMI)  Mechanical VTE Prophylaxis in Place: No SCD's with cellulitis of the right lower extremity and with venous stasis dermatitis of the bilateral lower extremities    Patient Centered Rounds: I have performed bedside rounds with nursing staff today  Time Spent for Care: 30 minutes  More than 50% of total time spent on counseling and coordination of care as described above  Current Length of Stay: 9 day(s)    Current Patient Status: Inpatient   Certification Statement: The patient will continue to require additional inpatient hospital stay due to the need for Bipap titration and the need for short-term rehabilitation placement upon discharge  Code Status: Level 1 - Full Code      Subjective: The patient was seen and examined  The patient is doing better  No chest pain  No shortness of breath  No abdominal pain  No nausea or vomiting  Objective:     Vitals:   Temp (24hrs), Av 1 °F (36 7 °C), Min:97 2 °F (36 2 °C), Max:99 °F (37 2 °C)    Temp:  [97 2 °F (36 2 °C)-99 °F (37 2 °C)] 99 °F (37 2 °C)  HR:  [77-91] 91  Resp:  [20] 20  BP: (123-125)/(53-59) 123/59  SpO2:  [97 %-99 %] 97 %  Body mass index is 75 57 kg/m²  Input and Output Summary (last 24 hours):        Intake/Output Summary (Last 24 hours) at 2020 1642  Last data filed at 2020 1054  Gross per 24 hour   Intake 360 ml   Output 900 ml   Net -540 ml       Physical Exam:     Physical Exam  General:  NAD, awake, alert, follows commands  HEENT:  NC/AT, mucous membranes moist  Neck:  Supple, No JVP elevation  CV:  + S1, + S2, RRR  Pulm:  Lung fields are CTA bilaterally  Abd:  Soft, Non-tender, Non-distended  Ext:  No clubbing/cyanosis, Edema of the bilateral lower extremities  Skin:  Erythema of the right anterior shin, Venous stasis dermatitis of the bilateral lower extremities       Additional Data:    Labs:    Results from last 7 days   Lab Units 20  0540   WBC Thousand/uL 6 71 HEMOGLOBIN g/dL 10 1*   HEMATOCRIT % 36 1   PLATELETS Thousands/uL 298   NEUTROS PCT % 62   LYMPHS PCT % 15   MONOS PCT % 10   EOS PCT % 12*     Results from last 7 days   Lab Units 01/06/20  0540   SODIUM mmol/L 142   POTASSIUM mmol/L 4 4   CHLORIDE mmol/L 104   CO2 mmol/L 36*   BUN mg/dL 5   CREATININE mg/dL 0 80   ANION GAP mmol/L 2*   CALCIUM mg/dL 8 0*   ALBUMIN g/dL 1 7*   TOTAL BILIRUBIN mg/dL 0 58   ALK PHOS U/L 60   ALT U/L 15   AST U/L 27   GLUCOSE RANDOM mg/dL 85                 Results from last 7 days   Lab Units 01/06/20  0540 01/03/20  0513 01/02/20  0505 01/01/20  0623 01/01/20  0622   LACTIC ACID mmol/L  --   --   --   --  0 9   PROCALCITONIN ng/ml 0 13 0 11 0 11 0 11  --            * I Have Reviewed All Lab Data Listed Above  * Additional Pertinent Lab Tests Reviewed: Patricio 66 Admission Reviewed      Recent Cultures (last 7 days):           Last 24 Hours Medication List:     Current Facility-Administered Medications:  acetaminophen 650 mg Oral Q6H PRN Golden Miners, DO   albuterol 2 5 mg Nebulization Q4H PRN Golden Miners, DO   ammonium lactate  Topical BID Golden Miners, DO   cholecalciferol 1,000 Units Oral Daily Golden Miners, DO   enoxaparin 60 mg Subcutaneous Q12H [address] Hilario Valdes, DO   FLUoxetine 20 mg Oral Daily Golden Miners, DO   folic acid 1 mg Oral Daily Golden Miners, DO   levothyroxine 125 mcg Oral Early Morning Golden Miners, DO   nystatin  Topical BID Enzo Tao MD   ondansetron 4 mg Intravenous Q8H PRN RICK Barnes        Today, Patient Was Seen By: Golden White DO    ** Please Note: Dictation voice to text software may have been used in the creation of this document   **

## 2020-01-06 NOTE — ASSESSMENT & PLAN NOTE
· The patient was evaluated by speech therapy  · Continue a regular diet with thin liquids per speech therapy's recommendations  · Aspiration precautions at all times

## 2020-01-06 NOTE — ASSESSMENT & PLAN NOTE
· Improved  · Avoid all hepatotoxic agents  · Follow the LFT's (liver function tests)    Results for [facility] (MRN [id number]) as of 1/2/2020 14:45   Ref   Range 1/1/2020 06:22   HEPATITIS A IGM ANTIBODY Latest Ref Range: Non-reactive, Equivocal-Suggest Recollect  Non-reactive   HEPATITIS B SURFACE ANTIGEN Latest Ref Range: Non-reactive, NonReactive - Confirmed  Non-reactive   HEPATITIS B CORE IGM ANTIBODY Latest Ref Range: Non-reactive  Non-reactive   HEPATITIS C ANTIBODY Latest Ref Range: Non-reactive  Non-reactive

## 2020-01-06 NOTE — ASSESSMENT & PLAN NOTE
· Probable undiagnosed obesity-hypoventilation syndrome and severe obstructive sleep apnea  · Trial Bipap QHS and anytime while sleeping for a goal oxygen saturation of 90% and above  · Utilize supplemental oxygen via the nasal cannula to maintain oxygen saturation levels at 90% and above  · She will need an outpatient sleep study completed as soon as possible to check for obstructive sleep apnea  · Consult Pulmonology, who will see the patient on 01/07/2020  · Outpatient Bariatric Surgery evaluation    Results for Ora Torres (MRN [id number]) as of 1/4/2020 13:47   Ref   Range 1/4/2020 10:09   pH, Arterial Latest Ref Range: 7 350 - 7 450  7 344 (L)   pCO2, Arterial Latest Ref Range: 36 0 - 44 0 mm Hg 70 3 (HH)   pO2, Arterial Latest Ref Range: 75 0 - 129 0 mm Hg 81 3   HCO3, Arterial Latest Ref Range: 22 0 - 28 0 mmol/L 37 4 (H)   Base Excess, Arterial Latest Units: mmol/L 9 6   O2 Content, Arterial Latest Ref Range: 16 0 - 23 0 mL/dL 14 7 (L)   O2 HGB,Arterial Latest Ref Range: 94 0 - 97 0 % 94 2   ABG SOURCE Unknown Radial, Left   NASAL CANNULA Unknown 3L

## 2020-01-07 LAB
ANION GAP SERPL CALCULATED.3IONS-SCNC: 1 MMOL/L (ref 4–13)
ATRIAL RATE: 93 BPM
BUN SERPL-MCNC: 6 MG/DL (ref 5–25)
CALCIUM SERPL-MCNC: 8 MG/DL (ref 8.3–10.1)
CHLORIDE SERPL-SCNC: 103 MMOL/L (ref 100–108)
CO2 SERPL-SCNC: 39 MMOL/L (ref 21–32)
CREAT SERPL-MCNC: 0.88 MG/DL (ref 0.6–1.3)
GFR SERPL CREATININE-BSD FRML MDRD: 72 ML/MIN/1.73SQ M
GLUCOSE SERPL-MCNC: 94 MG/DL (ref 65–140)
MAGNESIUM SERPL-MCNC: 1.9 MG/DL (ref 1.6–2.6)
P AXIS: 68 DEGREES
POTASSIUM SERPL-SCNC: 3.7 MMOL/L (ref 3.5–5.3)
PR INTERVAL: 176 MS
QRS AXIS: 70 DEGREES
QRSD INTERVAL: 108 MS
QT INTERVAL: 376 MS
QTC INTERVAL: 467 MS
SODIUM SERPL-SCNC: 143 MMOL/L (ref 136–145)
T WAVE AXIS: 35 DEGREES
VENTRICULAR RATE: 93 BPM

## 2020-01-07 PROCEDURE — 93010 ELECTROCARDIOGRAM REPORT: CPT | Performed by: INTERNAL MEDICINE

## 2020-01-07 PROCEDURE — 83735 ASSAY OF MAGNESIUM: CPT | Performed by: INTERNAL MEDICINE

## 2020-01-07 PROCEDURE — 80048 BASIC METABOLIC PNL TOTAL CA: CPT | Performed by: INTERNAL MEDICINE

## 2020-01-07 PROCEDURE — 94760 N-INVAS EAR/PLS OXIMETRY 1: CPT

## 2020-01-07 PROCEDURE — 99255 IP/OBS CONSLTJ NEW/EST HI 80: CPT | Performed by: PHYSICIAN ASSISTANT

## 2020-01-07 PROCEDURE — 99233 SBSQ HOSP IP/OBS HIGH 50: CPT | Performed by: INTERNAL MEDICINE

## 2020-01-07 PROCEDURE — 93005 ELECTROCARDIOGRAM TRACING: CPT

## 2020-01-07 RX ADMIN — ENOXAPARIN SODIUM 60 MG: 60 INJECTION SUBCUTANEOUS at 20:51

## 2020-01-07 RX ADMIN — ACETAMINOPHEN 650 MG: 325 TABLET ORAL at 18:16

## 2020-01-07 RX ADMIN — MELATONIN 1000 UNITS: at 09:26

## 2020-01-07 RX ADMIN — FLUOXETINE 20 MG: 20 CAPSULE ORAL at 09:26

## 2020-01-07 RX ADMIN — LEVOTHYROXINE SODIUM 125 MCG: 125 TABLET ORAL at 05:25

## 2020-01-07 RX ADMIN — NYSTATIN: 100000 POWDER TOPICAL at 09:28

## 2020-01-07 RX ADMIN — NYSTATIN 1 APPLICATION: 100000 POWDER TOPICAL at 18:17

## 2020-01-07 RX ADMIN — ENOXAPARIN SODIUM 60 MG: 60 INJECTION SUBCUTANEOUS at 09:27

## 2020-01-07 RX ADMIN — FOLIC ACID 1 MG: 1 TABLET ORAL at 09:26

## 2020-01-07 RX ADMIN — Medication 1 APPLICATION: at 18:17

## 2020-01-07 RX ADMIN — ACETAMINOPHEN 650 MG: 325 TABLET ORAL at 07:37

## 2020-01-07 RX ADMIN — Medication: at 09:27

## 2020-01-07 NOTE — PLAN OF CARE
Problem: Potential for Falls  Goal: Patient will remain free of falls  Description  INTERVENTIONS:  - Assess patient frequently for physical needs  -  Identify cognitive and physical deficits and behaviors that affect risk of falls    -  [place] fall precautions as indicated by assessment   - Educate patient/family on patient safety including physical limitations  - Instruct patient to call for assistance with activity based on assessment  - Modify environment to reduce risk of injury  - Consider OT/PT consult to assist with strengthening/mobility  Outcome: Progressing     Problem: Prexisting or High Potential for Compromised Skin Integrity  Goal: Skin integrity is maintained or improved  Description  INTERVENTIONS:  - Identify patients at risk for skin breakdown  - Assess and monitor skin integrity  - Assess and monitor nutrition and hydration status  - Monitor labs   - Assess for incontinence   - Turn and reposition patient  - Assist with mobility/ambulation  - Relieve pressure over bony prominences  - Avoid friction and shearing  - Provide appropriate hygiene as needed including keeping skin clean and dry  - Evaluate need for skin moisturizer/barrier cream  - Collaborate with interdisciplinary team   - Patient/family teaching  - Consider wound care consult   Outcome: Progressing     Problem: PAIN - ADULT  Goal: Verbalizes/displays adequate comfort level or baseline comfort level  Description  Interventions:  - Encourage patient to monitor pain and request assistance  - Assess pain using appropriate pain scale  - Administer analgesics based on type and severity of pain and evaluate response  - Implement non-pharmacological measures as appropriate and evaluate response  - Consider cultural and social influences on pain and pain management  - Notify physician/advanced practitioner if interventions unsuccessful or patient reports new pain  Outcome: Progressing     Problem: INFECTION - ADULT  Goal: Absence or prevention of progression during hospitalization  Description  INTERVENTIONS:  - Assess and monitor for signs and symptoms of infection  - Monitor lab/diagnostic results  - Administer medications as ordered  - Instruct and encourage patient and family to use good hand hygiene technique  - Identify and instruct in appropriate isolation precautions for identified infection/condition   Outcome: Progressing     Problem: SAFETY ADULT  Goal: Maintain or return to baseline ADL function  Description  INTERVENTIONS:  -  Assess patient's ability to carry out ADLs; assess patient's baseline for ADL function and identify physical deficits which impact ability to perform ADLs (bathing, care of mouth/teeth, toileting, grooming, dressing, etc )  - Assess/evaluate cause of self-care deficits   - Assess range of motion  - Assess patient's mobility; develop plan if impaired  - Assess patient's need for assistive devices and provide as appropriate  - Encourage maximum independence but intervene and supervise when necessary  - Involve family in performance of ADLs  - Assess for home care needs following discharge   - Consider OT consult to assist with ADL evaluation and planning for discharge  - Provide patient education as appropriate  Outcome: Progressing  Goal: Maintain or return mobility status to optimal level  Description  INTERVENTIONS:  - Assess patient's baseline mobility status (ambulation, transfers, stairs, etc )    - Identify cognitive and physical deficits and behaviors that affect mobility  - Identify mobility aids required to assist with transfers and/or ambulation (gait belt, sit-to-stand, lift, walker, cane, etc )  - [place] fall precautions as indicated by assessment  - Record patient progress and toleration of activity level on Mobility SBAR; progress patient to next Phase/Stage  - Instruct patient to call for assistance with activity based on assessment  - Consider rehabilitation consult to assist with strengthening/weightbearing, etc   Outcome: Progressing     Problem: DISCHARGE PLANNING  Goal: Discharge to home or other facility with appropriate resources  Description  INTERVENTIONS:  - Identify barriers to discharge w/patient and caregiver  - Arrange for needed discharge resources and transportation as appropriate  - Identify discharge learning needs (meds, wound care, etc )  - Arrange for interpretive services to assist at discharge as needed  - Refer to Case Management Department for coordinating discharge planning if the patient needs post-hospital services based on physician/advanced practitioner order or complex needs related to functional status, cognitive ability, or social support system  Outcome: Progressing     Problem: Knowledge Deficit  Goal: Patient/family/caregiver demonstrates understanding of disease process, treatment plan, medications, and discharge instructions  Description  Complete learning assessment and assess knowledge base    Interventions:  - Provide teaching at level of understanding  - Provide teaching via preferred learning methods  Outcome: Progressing

## 2020-01-07 NOTE — CONSULTS
Consultation - Pulmonary Medicine   Vikki Bryant 61 y o  female MRN: [id number]  Unit/Bed#: -01 Encounter: [id number]      Assessment:  1  Acute hypoxic/hypercapnic respiratory failure  Hypercapnia is appears chronic due to elevated serum bicarb noted on CMPs   -titrate supplemental oxygen to maintain O2 saturation greater than 90%  -home O2 evaluation prior to discharge  2   JESSE/OHS secondary to Morbid obesity with BMI of greater than 70   -outpatient PSG/split study  I placed order for split study and spoke to my office in [place] who will find sleep lab in area  -continue BiPAP   -diet exercise and weight loss discussed   -bariatric surgery referral      3  Acute metabolic encephalopathy  -likely combination of acute infection and hypoxia/hypercapnia  -improving    4  Right lower extremity cellulitis  -per Internal Medicine/wound care      History of Present Illness   Physician Requesting Consult: Bassam Chand MD  Reason for Consult / Principal Problem:   :  Acute hypoxic/hypercapnic respiratory failure  Morbid obesity BMI greater than 70  HPI: Vikki Bryant is a 61y o  year old female who was admitted for right lower extremity cellulitis  Patient states also reported mood changes and confusion from her family members  Patient with suspected JESSE/OHS who reports excessive daytime drowsiness with frequent naps, frequent morning headaches, "frequently waking up gasping for air"  Patient denies any history of lung disease otherwise and is lifelong nonsmoker  Patient did have blood gas 1/4   7 34/70/81/37/9 6 on 3 L nasal cannula  Complete metabolic panels suggesting chronic hypercapnia with elevated serum bicarb  Inpatient consult to Pulmonology  Consult performed by: Geri Ramon PA-C  Consult ordered by: Gilmar Guevara DO      I reviewed patient's medications, past medical history, allergies, social history and family history      Review of Systems   Constitutional: Positive for fatigue  Negative for chills and fever  HENT: Negative for sore throat and trouble swallowing  Respiratory: Negative  Negative for cough, chest tightness, shortness of breath and wheezing  Cardiovascular: Positive for leg swelling  Negative for chest pain and palpitations  Gastrointestinal: Negative for abdominal pain  Musculoskeletal: Negative for myalgias  Allergic/Immunologic: Negative for environmental allergies  Neurological: Negative for dizziness and seizures  Psychiatric/Behavioral: Positive for confusion and sleep disturbance  Excessive daytime drowsiness with frequent naps  Positive loud snoring for years  Frequently wakes from sleep sometimes gasping for air  Frequent morning headaches  All other systems reviewed and are negative  Historical Information   Past Medical History:   Diagnosis Date    Disease of thyroid gland     Lymphedema     Morbid obesity due to excess calories ([address] )     Renal disorder      History reviewed  No pertinent surgical history  Social History   Social History     Substance and Sexual Activity   Alcohol Use Not Currently     Social History     Substance and Sexual Activity   Drug Use Never     Social History     Tobacco Use   Smoking Status Never Smoker   Smokeless Tobacco Never Used   Never smoked  Denies any recent travel  Occupational History:  Worked at [employer] for 20 years    Retired    Family History: non-contributory    Meds/Allergies   all current active meds have been reviewed, pertinent pulmonary meds have been reviewed, current meds:   Current Facility-Administered Medications   Medication Dose Route Frequency    acetaminophen (TYLENOL) tablet 650 mg  650 mg Oral Q6H PRN    albuterol inhalation solution 2 5 mg  2 5 mg Nebulization Q4H PRN    ammonium lactate (LAC-HYDRIN) 12 % lotion   Topical BID    cholecalciferol (VITAMIN D3) tablet 1,000 Units  1,000 Units Oral Daily    enoxaparin (LOVENOX) subcutaneous injection 60 mg  60 mg Subcutaneous Q12H [facility]    FLUoxetine (PROzac) capsule 20 mg  20 mg Oral Daily    folic acid (FOLVITE) tablet 1 mg  1 mg Oral Daily    levothyroxine tablet 125 mcg  125 mcg Oral Early Morning    nystatin (MYCOSTATIN) powder   Topical BID    ondansetron (ZOFRAN) injection 4 mg  4 mg Intravenous Q8H PRN    and PTA meds:   Prior to Admission Medications   Prescriptions Last Dose Informant Patient Reported? Taking? FLUoxetine (PROzac) 20 MG tablet Past Week at Unknown time  Yes Yes   Sig: Take 20 mg by mouth daily   HYDROcodone-acetaminophen (NORCO) 5-325 mg per tablet Past Week at Unknown time  Yes Yes   Sig: Take 1 tablet by mouth every 6 (six) hours as needed for pain   buPROPion (WELLBUTRIN XL) 300 mg 24 hr tablet Past Week at Unknown time  Yes Yes   Sig: Take 300 mg by mouth daily   cholecalciferol (VITAMIN D3) 1,000 units tablet Past Week at Unknown time  Yes Yes   Sig: Take 2,000 Units by mouth daily   levothyroxine 125 mcg tablet Past Week at Unknown time  Yes Yes   Sig: Take 125 mcg by mouth daily   traMADol (ULTRAM) 50 mg tablet Past Week at Unknown time  Yes Yes   Sig: Take 50 mg by mouth every 12 (twelve) hours as needed for moderate pain      Facility-Administered Medications: None       No Known Allergies    Objective   Vitals: Blood pressure 132/72, pulse 82, temperature 99 °F (37 2 °C), resp  rate 20, height 5' 3" (1 6 m), weight (!) 194 kg (426 lb 9 6 oz), SpO2 99 %  ,Body mass index is 75 57 kg/m²  Intake/Output Summary (Last 24 hours) at 1/7/2020 1201  Last data filed at 1/7/2020 4057  Gross per 24 hour   Intake 360 ml   Output 800 ml   Net -440 ml     Invasive Devices     Peripheral Intravenous Line            Peripheral IV 01/06/20 Left;Proximal;Ventral (anterior) Forearm 1 day          Drain            External Urinary Catheter 6 days                Physical Exam   Constitutional: She is oriented to person, place, and time  No distress     Morbidly obese HENT:   Nose: Nose normal    Mouth/Throat: Oropharynx is clear and moist    Eyes: Pupils are equal, round, and reactive to light  Conjunctivae are normal  No scleral icterus  Neck: Neck supple  No tracheal deviation present  Cardiovascular: Normal rate, regular rhythm and normal heart sounds  Pulmonary/Chest: Effort normal  No stridor  No respiratory distress  She has no wheezes  She has no rales  She exhibits no tenderness  Diminished breath sounds throughout all lung fields secondary to body habitus   Abdominal: Soft  Bowel sounds are normal  There is no tenderness  Musculoskeletal: She exhibits edema ( stasis changes  Right lower extremity cellulitis)  Lymphadenopathy:     She has no cervical adenopathy  Neurological: She is alert and oriented to person, place, and time  Skin: Skin is warm and dry  Capillary refill takes less than 2 seconds  She is not diaphoretic  Psychiatric: She has a normal mood and affect  Nursing note and vitals reviewed  Lab Results:   I have personally reviewed pertinent lab results  , ABG: No results found for: PHART, AXQ6UXC, PO2ART, SEV9YFG, X4VFZVXF, BEART, SOURCE, BNP: No results found for: BNP, CBC: No results found for: WBC, HGB, HCT, MCV, PLT, ADJUSTEDWBC, MCH, MCHC, RDW, MPV, NRBC, CMP:   Lab Results   Component Value Date    SODIUM 143 01/07/2020    K 3 7 01/07/2020     01/07/2020    CO2 39 (H) 01/07/2020    BUN 6 01/07/2020    CREATININE 0 88 01/07/2020    CALCIUM 8 0 (L) 01/07/2020    EGFR 72 01/07/2020   , PT/INR: No results found for: PT, INR, Troponin: No results found for: TROPONINI  Imaging Studies: I have personally reviewed pertinent reports  and I have personally reviewed pertinent films in PACS  EKG, Pathology, and Other Studies: I have personally reviewed pertinent reports     and I have personally reviewed pertinent films in PACS  VTE Prophylaxis: Enoxaparin (Lovenox)    Code Status: Level 1 - Full Code

## 2020-01-07 NOTE — PROGRESS NOTES
Progress Note - Eleni Alaniz 1960, 61 y o  female MRN: [id number]    Unit/Bed#: -01 Encounter: [id number]    Primary Care Provider: Zakiya Trevizo DO   Date and time admitted to hospital: 12/28/2019 12:14 PM    * Cellulitis of right lower extremity  Assessment & Plan  · Initially treated with IV vancomycin and IV zosyn  · Completed a 5-day course of IV zosyn  · Completed an 8-day course of IV vancomycin  · PT/OT    MRSA culture [[id number]] (Normal) Collected: 12/31/19 1925   Lab Status: Final result Specimen: Nares from Nose Updated: 01/02/20 1241    MRSA Culture Only No Methicillin Resistant Staphlyococcus aureus (MRSA) isolated       Acute metabolic encephalopathy  Assessment & Plan  · Present on admission  · Likely secondary to acute infection and secondary to hypoxia as well as hypercapnia  · The patient's neurologic status has improved    Lymphedema  Assessment & Plan  · Chronic lymphedema of the bilateral lower extremities  · Outpatient lymphedema therapy    Prolonged QT interval  Assessment & Plan  · Avoid all QT-prolonging agents  · Follow the QT interval  · Recheck an EKG on 01/07/2020  · Outpatient Cardiology evaluation    Ambulatory dysfunction  Assessment & Plan  · The patient was evaluated by physical therapy and occupational therapy during the hospitalization  · The patient is unable to ambulate at this time  · The patient requires short-term rehabilitation placement upon discharge  · The patient would be an excellent candidate for LTACH placement    Acute respiratory failure with hypoxia and hypercapnia (HCC)  Assessment & Plan  · Probable undiagnosed obesity-hypoventilation syndrome and severe obstructive sleep apnea  · Trial Bipap QHS and anytime while sleeping for a goal oxygen saturation of 90% and above  · Utilize supplemental oxygen via the nasal cannula to maintain oxygen saturation levels at 90% and above  · She will need an outpatient sleep study completed as soon as possible to check for obstructive sleep apnea  · Consult Pulmonology, who will see the patient on 01/07/2020  · Outpatient Bariatric Surgery evaluation    Results for Juan Carlos Downs (MRN [id number]) as of 1/4/2020 13:47   Ref   Range 1/4/2020 10:09   pH, Arterial Latest Ref Range: 7 350 - 7 450  7 344 (L)   pCO2, Arterial Latest Ref Range: 36 0 - 44 0 mm Hg 70 3 (HH)   pO2, Arterial Latest Ref Range: 75 0 - 129 0 mm Hg 81 3   HCO3, Arterial Latest Ref Range: 22 0 - 28 0 mmol/L 37 4 (H)   Base Excess, Arterial Latest Units: mmol/L 9 6   O2 Content, Arterial Latest Ref Range: 16 0 - 23 0 mL/dL 14 7 (L)   O2 HGB,Arterial Latest Ref Range: 94 0 - 97 0 % 94 2   ABG SOURCE Unknown Radial, Left   NASAL CANNULA Unknown 3L       Acquired hypothyroidism  Assessment & Plan  · Continue her home dose of PO levothyroxine  Outpatient follow-up with PCP in regards to this matter    Left foot pain  Assessment & Plan  · Secondary to fall during the hospitalization    XR foot 2 vw left   Status: Final result   PACS Images      Show images for XR foot 2 vw left   Study Result     LEFT FOOT     INDICATION:   left foot pain, trauma, can be done portable      COMPARISON:  None     VIEWS:  XR FOOT 2 VW LEFT         FINDINGS:     There is no acute fracture or dislocation      No significant degenerative changes      No lytic or blastic lesions seen      Soft tissues are unremarkable      IMPRESSION:     No acute osseous abnormality         XR tibia fibula 2 vw left   Status: Final result   PACS Images      Show images for XR tibia fibula 2 vw left   Study Result     LEFT TIBIA AND FIBULA     INDICATION:   left ankle pain, can be done portable      COMPARISON:  None     VIEWS:  XR TIBIA FIBULA 2 VW LEFT         FINDINGS:     There is no acute fracture or dislocation      Advanced knee joint osteoarthritis is present      No lytic or blastic lesions are seen      Soft tissues are unremarkable      IMPRESSION:     No acute osseous abnormality           Skin breakdown  Assessment & Plan  · The patient was evaluated by the Wound Care nurse with the following impressions/recommendations:  Wound Care     Bilateral lower legs: wash legs with soap and water and a towel avoiding wounds to remove dead skin build up  Irrigate wounds with normal saline and pat dry  Apply thick layer of Silvasorb gel directly to wound base or apply to gauze then place directly on wound  Cover with 4x4 gauze and an ABD then secure with Kerlix and paper tape  Change once daily or as needed for strike through drainage       Right medial thigh: Apply skin prep to periwound and allow to dry  Loosely tuck 1/4" Iodoform packing strip into wound and use a small piece of tape to secure tail to skin  Cover with Allevyn non bordered foam and change daily or as needed for strike through drainage       Right posterior knee: Wash with soap and water then pat dry  Apply Maxorb Ag Rope to to inner aspect of knee, Change daily or as needed for strikethrough drainage       Right Lower Abdomen: Irrigate with normal saline then pat dry  Line underside of pannus with Maxorb Ag Rope and change daily       Wash skin folds with soap and water then pat dry  Dust skin folds underneath breasts, bilateral groin and torso with Nystatin powder three times daily       Apply Lac-Hydrin (ammonium lactate) lotion to bilateral legs avoiding wound base one to two times a day      Skin care Plan:  1-Protect sacrum w/Allevyn foam, luis enrique with P, change q3d and PRN, check skin q-shift  2-Turn/reposition q2h or when medically stable for pressure re-distribution on skin   3-Elevate heels to offload pressure]  4-Moisturize skin daily with skin nourishing cream  5-Ehob cushion in chair when out of bed    6-Hydraguard to bilateral heels BID and PRN        Morbid obesity with BMI of 70 and over, adult [facility])  Assessment & Plan  · Lifestyle modifications are recommended including weight loss, improving her diet, and increasing her amount of activity  · She will need an outpatient sleep study completed as soon as possible to check for obstructive sleep apnea  · Outpatient Bariatric Surgery evaluation    Transaminitis  Assessment & Plan  · Improved  · Avoid all hepatotoxic agents  · Follow the LFT's (liver function tests)    Results for [facility] (MRN [id number]) as of 2020 14:45   Ref  Range 2020 06:22   HEPATITIS A IGM ANTIBODY Latest Ref Range: Non-reactive, Equivocal-Suggest Recollect  Non-reactive   HEPATITIS B SURFACE ANTIGEN Latest Ref Range: Non-reactive, NonReactive - Confirmed  Non-reactive   HEPATITIS B CORE IGM ANTIBODY Latest Ref Range: Non-reactive  Non-reactive   HEPATITIS C ANTIBODY Latest Ref Range: Non-reactive  Non-reactive       VTE Pharmacologic Prophylaxis:   Pharmacologic: Enoxaparin (Lovenox)    Patient Centered Rounds: I have performed bedside rounds with nursing staff today    Discussions with Specialists or Other Care Team Provider:     Education and Discussions with Family / Patient:     Current Length of Stay: 10 day(s)    Current Patient Status: Inpatient   Certification Statement: The patient will continue to require additional inpatient hospital stay due to Awaiting placement    Discharge Plan:  Tomorrow    Code Status: Level 1 - Full Code      Subjective:   No complaints    Objective:     Vitals:   Temp (24hrs), Av °F (37 2 °C), Min:99 °F (37 2 °C), Max:99 °F (37 2 °C)    Temp:  [99 °F (37 2 °C)] 99 °F (37 2 °C)  HR:  [] 82  Resp:  [20-23] 20  BP: (123-132)/(59-72) 132/72  SpO2:  [89 %-99 %] 99 %  Body mass index is 75 57 kg/m²  Input and Output Summary (last 24 hours):        Intake/Output Summary (Last 24 hours) at 2020 1042  Last data filed at 2020 7401  Gross per 24 hour   Intake 720 ml   Output 800 ml   Net -80 ml       Physical Exam:     General appearance: alert, appears stated age and cooperative  Head: Normocephalic, without obvious abnormality, atraumatic  Lungs: clear to auscultation bilaterally  Heart: regular rate and rhythm  Abdomen: soft, non-tender, positive bowel sounds   Back: negative  Extremities: extremities atraumatic, no cyanosis or edema  Neurologic: Alert and oriented X 3, normal strength and tone  Normal symmetric reflexes  Normal coordination and gait    Additional Data:     Labs:    Results from last 7 days   Lab Units 01/06/20  0540   WBC Thousand/uL 6 71   HEMOGLOBIN g/dL 10 1*   HEMATOCRIT % 36 1   PLATELETS Thousands/uL 298   NEUTROS PCT % 62   LYMPHS PCT % 15   MONOS PCT % 10   EOS PCT % 12*     Results from last 7 days   Lab Units 01/07/20  0638 01/06/20  0540   SODIUM mmol/L 143 142   POTASSIUM mmol/L 3 7 4 4   CHLORIDE mmol/L 103 104   CO2 mmol/L 39* 36*   BUN mg/dL 6 5   CREATININE mg/dL 0 88 0 80   ANION GAP mmol/L 1* 2*   CALCIUM mg/dL 8 0* 8 0*   ALBUMIN g/dL  --  1 7*   TOTAL BILIRUBIN mg/dL  --  0 58   ALK PHOS U/L  --  60   ALT U/L  --  15   AST U/L  --  27   GLUCOSE RANDOM mg/dL 94 85                 Results from last 7 days   Lab Units 01/06/20  0540 01/03/20  0513 01/02/20  0505 01/01/20  0623 01/01/20  0622   LACTIC ACID mmol/L  --   --   --   --  0 9   PROCALCITONIN ng/ml 0 13 0 11 0 11 0 11  --            * I Have Reviewed All Lab Data Listed Above  * Additional Pertinent Lab Tests Reviewed:  Patricio 66 Admission Reviewed    Imaging:    Imaging Reports Reviewed Today Include: images reviewed    Recent Cultures (last 7 days):           Last 24 Hours Medication List:     Current Facility-Administered Medications:  acetaminophen 650 mg Oral Q6H PRN Fong Rolls, DO   albuterol 2 5 mg Nebulization Q4H PRN Fong Rolls, DO   ammonium lactate  Topical BID Fong Rolls, DO   cholecalciferol 1,000 Units Oral Daily Fong Rolls, DO   enoxaparin 60 mg Subcutaneous Q12H [address] Hilario Valdes, DO   FLUoxetine 20 mg Oral Daily Fong Rolls, DO   folic acid 1 mg Oral Daily Dno Mchenry Keisha,    levothyroxine 125 mcg Oral Early Morning Lucile Goltz, DO   nystatin  Topical BID Ron Anguiano MD   ondansetron 4 mg Intravenous Q8H PRN RICK Barnes        Today, Patient Was Seen By: Ron Anguiano MD    ** Please Note: Dictation voice to text software may have been used in the creation of this document   **

## 2020-01-07 NOTE — RESPIRATORY THERAPY NOTE
Patient found with BIPAP mask off  And nasal cannula in place  BIPAP machine in Stand By phase    Respiratory unaware of mask removal or cessation of BIPAP

## 2020-01-07 NOTE — ASSESSMENT & PLAN NOTE
· Initially treated with IV vancomycin and IV zosyn  · Completed a 5-day course of IV zosyn  · Completed an 8-day course of IV vancomycin  · PT/OT    MRSA culture [[id number]] (Normal) Collected: 12/31/19 1925   Lab Status: Final result Specimen: Nares from Nose Updated: 01/02/20 1241    MRSA Culture Only No Methicillin Resistant Staphlyococcus aureus (MRSA) isolated

## 2020-01-07 NOTE — ASSESSMENT & PLAN NOTE
· Improved  · Avoid all hepatotoxic agents  · Follow the LFT's (liver function tests)    Results for [facility] (MRN [id number]) as of 1/2/2020 14:45   Ref   Range 1/1/2020 06:22   HEPATITIS A IGM ANTIBODY Latest Ref Range: Non-reactive, Equivocal-Suggest Recollect  Non-reactive   HEPATITIS B SURFACE ANTIGEN Latest Ref Range: Non-reactive, NonReactive - Confirmed  Non-reactive   HEPATITIS B CORE IGM ANTIBODY Latest Ref Range: Non-reactive  Non-reactive   HEPATITIS C ANTIBODY Latest Ref Range: Non-reactive  Non-reactive

## 2020-01-07 NOTE — CONSULTS
Vancomycin IV Pharmacy-to-Dose Consultation  Jd Nobles is a 61 y o  female who was receiving Vancomycin IV with management by the Pharmacy Consult service for treatment of skin-soft tissue infection    The patients Vancomycin therapy has been completed / discontinued  Thank you for allowing us to take part in this patient's care  Pharmacy will sign-off now; please call or re-consult if there are any questions       Krystal Aquino, PharmD  Pharmacist

## 2020-01-07 NOTE — ASSESSMENT & PLAN NOTE
· Probable undiagnosed obesity-hypoventilation syndrome and severe obstructive sleep apnea  · Trial Bipap QHS and anytime while sleeping for a goal oxygen saturation of 90% and above  · Utilize supplemental oxygen via the nasal cannula to maintain oxygen saturation levels at 90% and above  · She will need an outpatient sleep study completed as soon as possible to check for obstructive sleep apnea  · Consult Pulmonology, who will see the patient on 01/07/2020  · Outpatient Bariatric Surgery evaluation    Results for Judy Recinos (MRN [id number]) as of 1/4/2020 13:47   Ref   Range 1/4/2020 10:09   pH, Arterial Latest Ref Range: 7 350 - 7 450  7 344 (L)   pCO2, Arterial Latest Ref Range: 36 0 - 44 0 mm Hg 70 3 (HH)   pO2, Arterial Latest Ref Range: 75 0 - 129 0 mm Hg 81 3   HCO3, Arterial Latest Ref Range: 22 0 - 28 0 mmol/L 37 4 (H)   Base Excess, Arterial Latest Units: mmol/L 9 6   O2 Content, Arterial Latest Ref Range: 16 0 - 23 0 mL/dL 14 7 (L)   O2 HGB,Arterial Latest Ref Range: 94 0 - 97 0 % 94 2   ABG SOURCE Unknown Radial, Left   NASAL CANNULA Unknown 3L

## 2020-01-07 NOTE — ASSESSMENT & PLAN NOTE
· The patient was evaluated by physical therapy and occupational therapy during the hospitalization  · The patient is unable to ambulate at this time  · The patient requires short-term rehabilitation placement upon discharge  · The patient would be an excellent candidate for [facility] placement

## 2020-01-07 NOTE — NURSING NOTE
Patient lying in bed watching T V, c/o 3 out of 10 pain for headache, patient given tylenol, Nystatin applied to folds and ammonium lactate applied to bilateral legs  Vital signs are stable  Will continue to monitor    Michael Wright RN

## 2020-01-07 NOTE — RESPIRATORY THERAPY NOTE
Called to patient's room by nurse who reported the patient's "SpO2 was in the 60s " despite the fact she was on bipap  Nurse instructed to increase liter flow of O2 from 3 L to 4L  Therapist went to room immediately  Patient's SpO2 was 85% on 4L, increased to 5L

## 2020-01-07 NOTE — CASE MANAGEMENT
Late note: initially started working on this case about 1000  Spoke to [address] liaison for [employer]  Patient clinically meets criteria for [facility] for needs however we discovered she does not meet the revenue coding of 3 nights for CCU vs ICU  CM discussed dc planning and needs with spouse/ patient and son in the room  We discussed LTACH care and benefits, also discussed that we were investigating revenue coding information  All are in agreement of LTACH care at this time at [facility] in [place]  Spouse shared that patient also has [employer]  CM called Siddharth Sims Clinical liaison they can not bill Medicare as we do not have the revenue coding, CM reviewed that she has [employer]  CM was unable to determined if it is primary or secondary insurance  Per Jessi Pradhan the business office is going to investigate the insurance  CM heard back from Jessi Pradhan and Elmer Leyva is primary and they are going to submit the clinical for authorization in AM   CM faxed updated information to [phone] and I have asked PT/OT to see Reyna Nuno and they will see her in the AM     CM will need to fax updated PT/OT notes for auth  CM left a message for patient's spouse in regards to plan of care, working on authorization from [employer] for [employer]  Pt was also updated  CM will follow up in the AM   Possible LTACH pending authorization

## 2020-01-08 PROCEDURE — 97530 THERAPEUTIC ACTIVITIES: CPT

## 2020-01-08 PROCEDURE — 99232 SBSQ HOSP IP/OBS MODERATE 35: CPT | Performed by: INTERNAL MEDICINE

## 2020-01-08 PROCEDURE — 94660 CPAP INITIATION&MGMT: CPT

## 2020-01-08 PROCEDURE — 94760 N-INVAS EAR/PLS OXIMETRY 1: CPT

## 2020-01-08 PROCEDURE — 97535 SELF CARE MNGMENT TRAINING: CPT

## 2020-01-08 RX ADMIN — NYSTATIN: 100000 POWDER TOPICAL at 19:27

## 2020-01-08 RX ADMIN — ENOXAPARIN SODIUM 60 MG: 60 INJECTION SUBCUTANEOUS at 10:20

## 2020-01-08 RX ADMIN — Medication: at 19:27

## 2020-01-08 RX ADMIN — LEVOTHYROXINE SODIUM 125 MCG: 125 TABLET ORAL at 05:05

## 2020-01-08 RX ADMIN — NYSTATIN: 100000 POWDER TOPICAL at 09:37

## 2020-01-08 RX ADMIN — MELATONIN 1000 UNITS: at 09:37

## 2020-01-08 RX ADMIN — ONDANSETRON 4 MG: 2 INJECTION INTRAMUSCULAR; INTRAVENOUS at 12:27

## 2020-01-08 RX ADMIN — Medication: at 09:37

## 2020-01-08 RX ADMIN — FOLIC ACID 1 MG: 1 TABLET ORAL at 09:37

## 2020-01-08 RX ADMIN — FLUOXETINE 20 MG: 20 CAPSULE ORAL at 09:37

## 2020-01-08 RX ADMIN — ACETAMINOPHEN 650 MG: 325 TABLET ORAL at 05:06

## 2020-01-08 RX ADMIN — ENOXAPARIN SODIUM 60 MG: 60 INJECTION SUBCUTANEOUS at 21:03

## 2020-01-08 NOTE — PLAN OF CARE
Problem: Potential for Falls  Goal: Patient will remain free of falls  Description  INTERVENTIONS:  - Assess patient frequently for physical needs  -  Identify cognitive and physical deficits and behaviors that affect risk of falls    -  [place] fall precautions as indicated by assessment   - Educate patient/family on patient safety including physical limitations  - Instruct patient to call for assistance with activity based on assessment  - Modify environment to reduce risk of injury  - Consider OT/PT consult to assist with strengthening/mobility  Outcome: Progressing     Problem: Prexisting or High Potential for Compromised Skin Integrity  Goal: Skin integrity is maintained or improved  Description  INTERVENTIONS:  - Identify patients at risk for skin breakdown  - Assess and monitor skin integrity  - Assess and monitor nutrition and hydration status  - Monitor labs   - Assess for incontinence   - Turn and reposition patient  - Assist with mobility/ambulation  - Relieve pressure over bony prominences  - Avoid friction and shearing  - Provide appropriate hygiene as needed including keeping skin clean and dry  - Evaluate need for skin moisturizer/barrier cream  - Collaborate with interdisciplinary team   - Patient/family teaching  - Consider wound care consult   Outcome: Progressing     Problem: PAIN - ADULT  Goal: Verbalizes/displays adequate comfort level or baseline comfort level  Description  Interventions:  - Encourage patient to monitor pain and request assistance  - Assess pain using appropriate pain scale  - Administer analgesics based on type and severity of pain and evaluate response  - Implement non-pharmacological measures as appropriate and evaluate response  - Consider cultural and social influences on pain and pain management  - Notify physician/advanced practitioner if interventions unsuccessful or patient reports new pain  Outcome: Progressing     Problem: INFECTION - ADULT  Goal: Absence or prevention of progression during hospitalization  Description  INTERVENTIONS:  - Assess and monitor for signs and symptoms of infection  - Monitor lab/diagnostic results  - Administer medications as ordered  - Instruct and encourage patient and family to use good hand hygiene technique  - Identify and instruct in appropriate isolation precautions for identified infection/condition   Outcome: Progressing     Problem: SAFETY ADULT  Goal: Maintain or return to baseline ADL function  Description  INTERVENTIONS:  -  Assess patient's ability to carry out ADLs; assess patient's baseline for ADL function and identify physical deficits which impact ability to perform ADLs (bathing, care of mouth/teeth, toileting, grooming, dressing, etc )  - Assess/evaluate cause of self-care deficits   - Assess range of motion  - Assess patient's mobility; develop plan if impaired  - Assess patient's need for assistive devices and provide as appropriate  - Encourage maximum independence but intervene and supervise when necessary  - Involve family in performance of ADLs  - Assess for home care needs following discharge   - Consider OT consult to assist with ADL evaluation and planning for discharge  - Provide patient education as appropriate  Outcome: Progressing  Goal: Maintain or return mobility status to optimal level  Description  INTERVENTIONS:  - Assess patient's baseline mobility status (ambulation, transfers, stairs, etc )    - Identify cognitive and physical deficits and behaviors that affect mobility  - Identify mobility aids required to assist with transfers and/or ambulation (gait belt, sit-to-stand, lift, walker, cane, etc )  - [place] fall precautions as indicated by assessment  - Record patient progress and toleration of activity level on Mobility SBAR; progress patient to next Phase/Stage  - Instruct patient to call for assistance with activity based on assessment  - Consider rehabilitation consult to assist with strengthening/weightbearing, etc   Outcome: Progressing     Problem: DISCHARGE PLANNING  Goal: Discharge to home or other facility with appropriate resources  Description  INTERVENTIONS:  - Identify barriers to discharge w/patient and caregiver  - Arrange for needed discharge resources and transportation as appropriate  - Identify discharge learning needs (meds, wound care, etc )  - Arrange for interpretive services to assist at discharge as needed  - Refer to Case Management Department for coordinating discharge planning if the patient needs post-hospital services based on physician/advanced practitioner order or complex needs related to functional status, cognitive ability, or social support system  Outcome: Progressing     Problem: Knowledge Deficit  Goal: Patient/family/caregiver demonstrates understanding of disease process, treatment plan, medications, and discharge instructions  Description  Complete learning assessment and assess knowledge base    Interventions:  - Provide teaching at level of understanding  - Provide teaching via preferred learning methods  Outcome: Progressing

## 2020-01-08 NOTE — ASSESSMENT & PLAN NOTE
· Likely secondary to acute infection  · Outpatient Cardiology evaluation    Echo complete with contrast if indicated   Status: Final result   PACS Images      Show images for Echo complete with contrast if indicated   Study Result     [address]  [facility], [address] [place] [address]     Transthoracic Echocardiogram  2D, M-mode, Doppler, and Color Doppler     Study date:  2020     Patient: Evans Marmolejo  MR number: [id number]  Account number: [de-identified]  : 1960  Age: 61 years  Gender: Female  Status: Inpatient  Location: [facility] Echo Lab  Height: 63 in  Weight: 426 lb  BP: 134/ 66 mmHg     Indications: MI     Diagnoses: I21 4 - Non-ST elevation (NSTEMI) myocardial infarction     Sonographer:  HAILEY Sandoval  Primary Physician:  Flor Garza DO  Referring Physician:  Jyothi Cash DO  Group:  [facility]  Interpreting Physician:  Preet Christianson DO     IMPRESSIONS:  Technically difficult study     SUMMARY     LEFT VENTRICLE:  Difficult to assess complete regional wal motion  Mildly abnormal septal motion, more consistant with conduction delay  Size was normal   Systolic function was normal  Ejection fraction was estimated in the range of 50 % to 55 %  This study was inadequate for the evaluation of regional wall motion  Wall thickness was mildly increased      TRICUSPID VALVE:  There was trace regurgitation      HISTORY: PRIOR HISTORY: Elevated troponins, morbid obesity     PROCEDURE: The study was performed in the [facility] Echo Lab  This was a routine study  The transthoracic approach was used  The study included complete 2D imaging, M-mode, complete spectral Doppler, and color Doppler  The heart  rate was 87 bpm, at the start of the study  Images were obtained from the parasternal, apical, subcostal, and suprasternal notch acoustic windows   Intravenous contrast ( 1 0 ml of Definity) was administered to opacify the left ventricle  Echocardiographic views were limited due to restricted patient mobility, poor patient compliance, poor acoustic window availability, and decreased penetration  This was a technically difficult study      LEFT VENTRICLE: Difficult to assess complete regional wal motion  Mildly abnormal septal motion, more consistant with conduction delay  Size was normal  Systolic function was normal  Ejection fraction was estimated in the range of 50 % to  55 %  This study was inadequate for the evaluation of regional wall motion  Wall thickness was mildly increased  DOPPLER: Left ventricular diastolic function parameters were normal for the patient's age      RIGHT VENTRICLE: The size was normal  Systolic function was normal      LEFT ATRIUM: Size was normal      RIGHT ATRIUM: Size was normal      MITRAL VALVE: Valve structure was normal  DOPPLER: There was no significant regurgitation      AORTIC VALVE: The valve was probably trileaflet  DOPPLER: There was no significant regurgitation      TRICUSPID VALVE: The valve structure was normal  DOPPLER: There was trace regurgitation      PULMONIC VALVE: DOPPLER: There was no evidence for stenosis  There was no regurgitation      PERICARDIUM: There was no pericardial effusion      AORTA: The root exhibited normal size      SYSTEM MEASUREMENT TABLES     2D  %FS: 29 38 %  AV Diam: 2 58 cm  EDV(Teich): 72 64 ml  EF(Teich): 56 81 %  ESV(Teich): 31 37 ml  IVSd: 1 35 cm  LVEDV MOD A4C: 97 9 ml  LVEF MOD A4C: 65 45 %  LVESV MOD A4C: 33 83 ml  LVIDd: 4 06 cm  LVIDs: 2 87 cm  LVLd A4C: 8 48 cm  LVLs A4C: 6 48 cm  LVPWd: 1 06 cm  RWT: 0 52  SV MOD A4C: 64 08 ml  SV(Teich): 41 27 ml     PW  E': 0 14 m/s  E/E': 7 27  MV A Philip: 0 93 m/s  MV Dec [place]: 11 15 m/s2  MV DecT: 92 15 ms  MV E Philip: 1 03 m/s  MV E/A Ratio: 1 11     [employer] Accredited Echocardiography Laboratory     Prepared and electronically signed by  Tad Christianson DO  Signed 03-Jan-2020 10:27:55

## 2020-01-08 NOTE — ASSESSMENT & PLAN NOTE
· Improved  · Avoid all hepatotoxic agents  · Follow the LFT's (liver function tests)    Results for [facility] (MRN [id number]) as of 1/2/2020 14:45   Ref   Range 1/1/2020 06:22   HEPATITIS A IGM ANTIBODY Latest Ref Range: Non-reactive, Equivocal-Suggest Recollect  Non-reactive   HEPATITIS B SURFACE ANTIGEN Latest Ref Range: Non-reactive, NonReactive - Confirmed  Non-reactive   HEPATITIS B CORE IGM ANTIBODY Latest Ref Range: Non-reactive  Non-reactive   HEPATITIS C ANTIBODY Latest Ref Range: Non-reactive  Non-reactive

## 2020-01-08 NOTE — ASSESSMENT & PLAN NOTE
· Probable undiagnosed obesity-hypoventilation syndrome and severe obstructive sleep apnea  · The patient has been initiated on Bipap QHS and anytime while sleeping for a goal oxygen saturation of 90% and above  · Utilize supplemental oxygen via the nasal cannula to maintain oxygen saturation levels at 90% and above  · She will need an outpatient sleep study completed as soon as possible to check for obstructive sleep apnea  · The patient was seen in consultation by Pulmonology  · Outpatient Pulmonology evaluation  · Outpatient Bariatric Surgery evaluation    Results for [facility] (MRN [id number]) as of 1/4/2020 13:47   Ref   Range 1/4/2020 10:09   pH, Arterial Latest Ref Range: 7 350 - 7 450  7 344 (L)   pCO2, Arterial Latest Ref Range: 36 0 - 44 0 mm Hg 70 3 (HH)   pO2, Arterial Latest Ref Range: 75 0 - 129 0 mm Hg 81 3   HCO3, Arterial Latest Ref Range: 22 0 - 28 0 mmol/L 37 4 (H)   Base Excess, Arterial Latest Units: mmol/L 9 6   O2 Content, Arterial Latest Ref Range: 16 0 - 23 0 mL/dL 14 7 (L)   O2 HGB,Arterial Latest Ref Range: 94 0 - 97 0 % 94 2   ABG SOURCE Unknown Radial, Left   NASAL CANNULA Unknown 3L

## 2020-01-08 NOTE — PLAN OF CARE
Problem: PHYSICAL THERAPY ADULT  Goal: Performs mobility at highest level of function for planned discharge setting  See evaluation for individualized goals  Description  Treatment/Interventions: Functional transfer training, LE strengthening/ROM, Elevations, Therapeutic exercise, Endurance training, Patient/family training, Equipment eval/education, Bed mobility, Gait training, Spoke to nursing, Spoke to case management, OT  Equipment Recommended: Yonas Anderson, Wheelchair       See flowsheet documentation for full assessment, interventions and recommendations  Outcome: Progressing  Note:   Prognosis: Fair  Problem List: Decreased strength, Decreased range of motion, Decreased endurance, Impaired balance, Decreased mobility, Impaired judgement, Decreased safety awareness, Obesity, Decreased skin integrity  Assessment: Pt tolerated session fairly  She tolerated increased transfer trials and was able to just barely clear buttock from EOB via side exit and fully clear buttock via foot exit  She is able to scoot forward via foot exit with use of bedrails and UEs, but requires assistance of 2 people each side to scoot backward  She continues to require increased assistance with bed mobility due to decreased strength, balance and endurance  She was more participative this date with transfer trials being able to clear buttock  She desaturated to 88% 1x with bed mobility, then remained form 93-98% on 2 lpm throughout  She will continue to benefit from PT services to increase mobility as able to facilitate standing and progression to ambulation  Continue to recommend STR to maximize LOF  Barriers to Discharge Comments: currently requiring Ax3 for repositioning   Recommendation: Short-term skilled PT     PT - OK to Discharge: Yes(when medically cleared)    See flowsheet documentation for full assessment

## 2020-01-08 NOTE — ASSESSMENT & PLAN NOTE
· The patient was evaluated by physical therapy and occupational therapy during the hospitalization  · The patient is unable to ambulate at this time  · The patient requires short-term rehabilitation placement upon discharge  · The patient would be an excellent candidate for [facility] placement

## 2020-01-08 NOTE — PROGRESS NOTES
Progress Note - Bhargav Mays 1960, 61 y o  female MRN: [id number]    Unit/Bed#: -01 Encounter: [id number]    Primary Care Provider: Trinidad Castillo DO   Date and time admitted to hospital: 12/28/2019 12:14 PM        * Cellulitis of right lower extremity  Assessment & Plan  · Initially treated with IV vancomycin and IV zosyn  · Completed a 5-day course of IV zosyn  · Completed an 8-day course of IV vancomycin  · PT/OT    MRSA culture [[id number]] (Normal) Collected: 12/31/19 1925   Lab Status: Final result Specimen: Nares from Nose Updated: 01/02/20 1241    MRSA Culture Only No Methicillin Resistant Staphlyococcus aureus (MRSA) isolated         Acute respiratory failure with hypoxia and hypercapnia (HCC)  Assessment & Plan  · Probable undiagnosed obesity-hypoventilation syndrome and severe obstructive sleep apnea  · The patient has been initiated on Bipap QHS and anytime while sleeping for a goal oxygen saturation of 90% and above  · Utilize supplemental oxygen via the nasal cannula to maintain oxygen saturation levels at 90% and above  · She will need an outpatient sleep study completed as soon as possible to check for obstructive sleep apnea  · The patient was seen in consultation by Pulmonology  · Outpatient Pulmonology evaluation  · Outpatient Bariatric Surgery evaluation    Results for [facility] (MRN [id number]) as of 1/4/2020 13:47   Ref   Range 1/4/2020 10:09   pH, Arterial Latest Ref Range: 7 350 - 7 450  7 344 (L)   pCO2, Arterial Latest Ref Range: 36 0 - 44 0 mm Hg 70 3 (HH)   pO2, Arterial Latest Ref Range: 75 0 - 129 0 mm Hg 81 3   HCO3, Arterial Latest Ref Range: 22 0 - 28 0 mmol/L 37 4 (H)   Base Excess, Arterial Latest Units: mmol/L 9 6   O2 Content, Arterial Latest Ref Range: 16 0 - 23 0 mL/dL 14 7 (L)   O2 HGB,Arterial Latest Ref Range: 94 0 - 97 0 % 94 2   ABG SOURCE Unknown Radial, Left   NASAL CANNULA Unknown 3L       Acute metabolic encephalopathy  Assessment & Plan  · Present on admission  · Likely secondary to acute infection and secondary to hypoxia as well as hypercapnia  · The patient's neurologic status has improved    Ambulatory dysfunction  Assessment & Plan  · The patient was evaluated by physical therapy and occupational therapy during the hospitalization  · The patient is unable to ambulate at this time  · The patient requires short-term rehabilitation placement upon discharge  · The patient would be an excellent candidate for LTACH placement    Folic acid deficiency  Assessment & Plan  · Received folic acid 1 mg IV x 1 dose on 01/05/2020, and continue folic acid 1 mg PO Qdaily    Prolonged QT interval  Assessment & Plan  · Avoid all QT-prolonging agents  · Follow the QT interval  · Outpatient Cardiology evaluation    ECG 12 lead   Order: [id number]   Status:  Final result   Visible to patient:  No (Inaccessible in [address])   Next appt:  None    Ref Range & Units 1/7/20 0840   Ventricular Rate BPM 93    Atrial Rate BPM 93    IN Interval ms 176    QRSD Interval ms 108    QT Interval ms 376    QTC Interval ms 467    P [place] degrees 68    QRS Axis degrees 70    T Wave Axis degrees 35       Narrative & Impression     Normal sinus rhythm  Non-specific intra-ventricular conduction delay  When compared with ECG of 29-DEC-2019 11:57,  Nonspecific T wave abnormality, improved in Inferior leads  T wave inversion no longer evident in Anterior leads  Confirmed by [address] [place] [address] ([id number]) on 1/7/2020 6:59:02 PM               Hypophosphatemia  Assessment & Plan  · Resolved with PO phosphorus supplementation  · Follow the phosphorus level    Dysphagia  Assessment & Plan  · The patient was evaluated by speech therapy  · Continue a regular diet with thin liquids per speech therapy's recommendations  · Aspiration precautions at all times    Anemia  Assessment & Plan  · With folic acid deficiency  · Received folic acid 1 mg IV x 1 dose on 01/05/2020, and continue folic acid 1 mg PO Qdaily  · Follow the CBC  · Transfuse for a hemoglobin less than 7 g/dl    Results for [facility] (MRN [id number]) as of 1/5/2020 10:22   Ref   Range 1/4/2020 04:55   Iron Latest Ref Range: 50 - 170 ug/dL 52   Ferritin Latest Ref Range: 8 - 388 ng/mL 54   Iron Saturation Latest Units: % 18   TIBC Latest Ref Range: 250 - 450 ug/dL 295   Folate Latest Ref Range: 3 1 - 17 5 ng/mL 2 4 (L)   Vitamin B-12 Latest Ref Range: 100 - 900 pg/mL 1,050 (H)       Acquired hypothyroidism  Assessment & Plan  · Continue her home dose of PO levothyroxine  Outpatient follow-up with PCP in regards to this matter      Left foot pain  Assessment & Plan  · Secondary to fall during the hospitalization    XR foot 2 vw left   Status: Final result   PACS Images      Show images for XR foot 2 vw left   Study Result     LEFT FOOT     INDICATION:   left foot pain, trauma, can be done portable      COMPARISON:  None     VIEWS:  XR FOOT 2 VW LEFT         FINDINGS:     There is no acute fracture or dislocation      No significant degenerative changes      No lytic or blastic lesions seen      Soft tissues are unremarkable      IMPRESSION:     No acute osseous abnormality         XR tibia fibula 2 vw left   Status: Final result   PACS Images      Show images for XR tibia fibula 2 vw left   Study Result     LEFT TIBIA AND FIBULA     INDICATION:   left ankle pain, can be done portable      COMPARISON:  None     VIEWS:  XR TIBIA FIBULA 2 VW LEFT         FINDINGS:     There is no acute fracture or dislocation      Advanced knee joint osteoarthritis is present      No lytic or blastic lesions are seen      Soft tissues are unremarkable      IMPRESSION:     No acute osseous abnormality             Elevated troponin level  Assessment & Plan  · Likely secondary to acute infection  · Outpatient Cardiology evaluation    Echo complete with contrast if indicated   Status: Final result   PACS Images      Show images for Echo complete with contrast if indicated   Study Result     [address]  [facility] [place], [facility]     Transthoracic Echocardiogram  2D, M-mode, Doppler, and Color Doppler     Study date:  2020     Patient: Sophy Carpenter  MR number: [id number]  Account number: [de-identified]  : 1960  Age: 61 years  Gender: Female  Status: Inpatient  Location: [facility]  Height: 63 in  Weight: 426 lb  BP: 134/ 66 mmHg     Indications: MI     Diagnoses: I21 4 - Non-ST elevation (NSTEMI) myocardial infarction     Sonographer:  HAILEY Quarles  Primary Physician:  Nae House DO  Referring Physician:  Tomeka Eckert DO  Group:  [facility]  Interpreting Physician:  Sp Watkins DO     IMPRESSIONS:  Technically difficult study     SUMMARY     LEFT VENTRICLE:  Difficult to assess complete regional wal motion  Mildly abnormal septal motion, more consistant with conduction delay  Size was normal   Systolic function was normal  Ejection fraction was estimated in the range of 50 % to 55 %  This study was inadequate for the evaluation of regional wall motion  Wall thickness was mildly increased      TRICUSPID VALVE:  There was trace regurgitation      HISTORY: PRIOR HISTORY: Elevated troponins, morbid obesity     PROCEDURE: The study was performed in the [facility]  This was a routine study  The transthoracic approach was used  The study included complete 2D imaging, M-mode, complete spectral Doppler, and color Doppler  The heart  rate was 87 bpm, at the start of the study  Images were obtained from the parasternal, apical, subcostal, and suprasternal notch acoustic windows  Intravenous contrast ( 1 0 ml of Definity) was administered to opacify the left ventricle  Echocardiographic views were limited due to restricted patient mobility, poor patient compliance, poor acoustic window availability, and decreased penetration   This was a technically difficult study      LEFT VENTRICLE: Difficult to assess complete regional wal motion  Mildly abnormal septal motion, more consistant with conduction delay  Size was normal  Systolic function was normal  Ejection fraction was estimated in the range of 50 % to  55 %  This study was inadequate for the evaluation of regional wall motion  Wall thickness was mildly increased  DOPPLER: Left ventricular diastolic function parameters were normal for the patient's age      RIGHT VENTRICLE: The size was normal  Systolic function was normal      LEFT ATRIUM: Size was normal      RIGHT ATRIUM: Size was normal      MITRAL VALVE: Valve structure was normal  DOPPLER: There was no significant regurgitation      AORTIC VALVE: The valve was probably trileaflet  DOPPLER: There was no significant regurgitation      TRICUSPID VALVE: The valve structure was normal  DOPPLER: There was trace regurgitation      PULMONIC VALVE: DOPPLER: There was no evidence for stenosis  There was no regurgitation      PERICARDIUM: There was no pericardial effusion      AORTA: The root exhibited normal size      SYSTEM MEASUREMENT TABLES     2D  %FS: 29 38 %  AV Diam: 2 58 cm  EDV(Teich): 72 64 ml  EF(Teich): 56 81 %  ESV(Teich): 31 37 ml  IVSd: 1 35 cm  LVEDV MOD A4C: 97 9 ml  LVEF MOD A4C: 65 45 %  LVESV MOD A4C: 33 83 ml  LVIDd: 4 06 cm  LVIDs: 2 87 cm  LVLd A4C: 8 48 cm  LVLs A4C: 6 48 cm  LVPWd: 1 06 cm  RWT: 0 52  SV MOD A4C: 64 08 ml  SV(Teich): 41 27 ml     PW  E': 0 14 m/s  E/E': 7 27  MV A Philip: 0 93 m/s  MV Dec Towner: 11 15 m/s2  MV DecT: 92 15 ms  MV E Philip: 1 03 m/s  MV E/A Ratio: 1 11     [employer] Accredited Echocardiography Laboratory     Prepared and electronically signed by  Gretta Richard DO  Signed 03-Jan-2020 10:27:55            Skin breakdown  Assessment & Plan  · The patient was evaluated by the Wound Care nurse with the following impressions/recommendations:  Wound Care     Bilateral lower legs: wash legs with soap and water and a towel avoiding wounds to remove dead skin build up  Irrigate wounds with normal saline and pat dry  Apply thick layer of Silvasorb gel directly to wound base or apply to gauze then place directly on wound  Cover with 4x4 gauze and an ABD then secure with Kerlix and paper tape  Change once daily or as needed for strike through drainage       Right medial thigh: Apply skin prep to periwound and allow to dry  Loosely tuck 1/4" Iodoform packing strip into wound and use a small piece of tape to secure tail to skin  Cover with Allevyn non bordered foam and change daily or as needed for strike through drainage       Right posterior knee: Wash with soap and water then pat dry  Apply Maxorb Ag Rope to to inner aspect of knee, Change daily or as needed for strikethrough drainage       Right Lower Abdomen: Irrigate with normal saline then pat dry  Line underside of pannus with Maxorb Ag Rope and change daily       Wash skin folds with soap and water then pat dry  Dust skin folds underneath breasts, bilateral groin and torso with Nystatin powder three times daily       Apply Lac-Hydrin (ammonium lactate) lotion to bilateral legs avoiding wound base one to two times a day      Skin care Plan:  1-Protect sacrum w/Allevyn foam, luis enriuqe with P, change q3d and PRN, check skin q-shift  2-Turn/reposition q2h or when medically stable for pressure re-distribution on skin   3-Elevate heels to offload pressure]  4-Moisturize skin daily with skin nourishing cream  5-Ehob cushion in chair when out of bed  6-Hydraguard to bilateral heels BID and PRN        Morbid obesity with BMI of 70 and over, adult [facility])  Assessment & Plan  · Lifestyle modifications are recommended including weight loss, improving her diet, and increasing her amount of activity    · She will need an outpatient sleep study completed as soon as possible to check for obstructive sleep apnea  · Outpatient Bariatric Surgery evaluation    Transaminitis  Assessment & Plan  · Improved  · Avoid all hepatotoxic agents  · Follow the LFT's (liver function tests)    Results for [facility] (MRN [id number]) as of 2020 14:45   Ref  Range 2020 06:22   HEPATITIS A IGM ANTIBODY Latest Ref Range: Non-reactive, Equivocal-Suggest Recollect  Non-reactive   HEPATITIS B SURFACE ANTIGEN Latest Ref Range: Non-reactive, NonReactive - Confirmed  Non-reactive   HEPATITIS B CORE IGM ANTIBODY Latest Ref Range: Non-reactive  Non-reactive   HEPATITIS C ANTIBODY Latest Ref Range: Non-reactive  Non-reactive       Lymphedema  Assessment & Plan  · Chronic lymphedema of the bilateral lower extremities  · Outpatient lymphedema therapy        VTE Pharmacologic Prophylaxis:   Pharmacologic: Enoxaparin (Lovenox) 60 mg SQ every 12 hours (Dose based on her BMI)  Mechanical VTE Prophylaxis in Place: No SCD's with cellulitis of the right lower extremity and venous stasis dermatitis of the bilateral lower extremities  Patient Centered Rounds: I have performed bedside rounds with nursing staff today  Time Spent for Care: 30 minutes  More than 50% of total time spent on counseling and coordination of care as described above  Current Length of Stay: 11 day(s)    Current Patient Status: Inpatient   Certification Statement: The patient will continue to require additional inpatient hospital stay due to awaiting short-term rehabilitation placement  Code Status: Level 1 - Full Code      Subjective: The patient was seen and examined  The patient is doing better  No chest pain  No shortness of breath  No abdominal pain  No nausea or vomiting  Objective:     Vitals:   Temp (24hrs), Av 2 °F (36 8 °C), Min:98 1 °F (36 7 °C), Max:98 5 °F (36 9 °C)    Temp:  [98 1 °F (36 7 °C)-98 5 °F (36 9 °C)] 98 1 °F (36 7 °C)  HR:  [87-92] 89  Resp:  [16-19] 16  BP: (107-129)/(61-70) 107/61  SpO2:  [93 %-94 %] 93 %  Body mass index is 75 57 kg/m²       Input and Output Summary (last 24 hours): Intake/Output Summary (Last 24 hours) at 1/8/2020 1255  Last data filed at 1/8/2020 0834  Gross per 24 hour   Intake 1854 ml   Output 1000 ml   Net 854 ml       Physical Exam:     Physical Exam  General:  NAD, awake, alert, follows commands  HEENT:  NC/AT, mucous membranes moist  Neck:  Supple, No JVP elevation  CV:  + S1, + S2, RRR  Pulm:  Lung fields are CTA bilaterally  Abd:  Soft, Non-tender, Non-distended  Ext:  No clubbing/cyanosis, Edema of the bilateral lower extremities  Skin:  Venous stasis dermatitis of the bilateral anterior shin regions      Additional Data:    Labs:    Results from last 7 days   Lab Units 01/06/20  0540   WBC Thousand/uL 6 71   HEMOGLOBIN g/dL 10 1*   HEMATOCRIT % 36 1   PLATELETS Thousands/uL 298   NEUTROS PCT % 62   LYMPHS PCT % 15   MONOS PCT % 10   EOS PCT % 12*     Results from last 7 days   Lab Units 01/07/20  0638 01/06/20  0540   SODIUM mmol/L 143 142   POTASSIUM mmol/L 3 7 4 4   CHLORIDE mmol/L 103 104   CO2 mmol/L 39* 36*   BUN mg/dL 6 5   CREATININE mg/dL 0 88 0 80   ANION GAP mmol/L 1* 2*   CALCIUM mg/dL 8 0* 8 0*   ALBUMIN g/dL  --  1 7*   TOTAL BILIRUBIN mg/dL  --  0 58   ALK PHOS U/L  --  60   ALT U/L  --  15   AST U/L  --  27   GLUCOSE RANDOM mg/dL 94 85                 Results from last 7 days   Lab Units 01/06/20  0540 01/03/20  0513 01/02/20  0505   PROCALCITONIN ng/ml 0 13 0 11 0 11           * I Have Reviewed All Lab Data Listed Above  * Additional Pertinent Lab Tests Reviewed:  Patricio 66 Admission Reviewed      Recent Cultures (last 7 days):           Last 24 Hours Medication List:     Current Facility-Administered Medications:  acetaminophen 650 mg Oral Q6H PRN Golden Miners, DO   albuterol 2 5 mg Nebulization Q4H PRN Golden Miners, DO   ammonium lactate  Topical BID Golden Miners, DO   cholecalciferol 1,000 Units Oral Daily Golden Miners, DO   enoxaparin 60 mg Subcutaneous Q12H [address] [place],    FLUoxetine 20 mg Oral Daily Parvin Lester,    folic acid 1 mg Oral Daily Parvin Lester,    levothyroxine 125 mcg Oral Early Morning Parvin Lester,    nystatin  Topical BID Elisabeth Peterson MD   ondansetron 4 mg Intravenous Q8H PRN RICK Barnes        Today, Patient Was Seen By: Parvin Lester DO    ** Please Note: Dictation voice to text software may have been used in the creation of this document   **

## 2020-01-08 NOTE — ASSESSMENT & PLAN NOTE
· Avoid all QT-prolonging agents  · Follow the QT interval  · Outpatient Cardiology evaluation    ECG 12 lead   Order: [id number]   Status:  Final result   Visible to patient:  No (Inaccessible in [address])   Next appt:  None    Ref Range & Units 1/7/20 0840   Ventricular Rate BPM 93    Atrial Rate BPM 93    NC Interval ms 176    QRSD Interval ms 108    QT Interval ms 376    QTC Interval ms 467    P [place] degrees 68    QRS Axis degrees 70    T Wave Axis degrees 35       Narrative & Impression     Normal sinus rhythm  Non-specific intra-ventricular conduction delay  When compared with ECG of 29-DEC-2019 11:57,  Nonspecific T wave abnormality, improved in Inferior leads  T wave inversion no longer evident in Anterior leads  Confirmed by [address] [place] [address] ([id number]) on 1/7/2020 6:59:02 PM

## 2020-01-08 NOTE — PLAN OF CARE
Problem: OCCUPATIONAL THERAPY ADULT  Goal: Performs self-care activities at highest level of function for planned discharge setting  See evaluation for individualized goals  Description  Treatment Interventions: ADL retraining, Functional transfer training, UE strengthening/ROM, Endurance training, Patient/family training, Equipment evaluation/education, Compensatory technique education, Continued evaluation, Energy conservation, Activityengagement          See flowsheet documentation for full assessment, interventions and recommendations  Outcome: Not Progressing  Note:   Limitation: Decreased ADL status, Decreased UE strength, Decreased Safe judgement during ADL, Decreased endurance, Decreased high-level ADLs  Prognosis: Fair  Assessment: Pt seen for treatment session #2 this date  Pt alert and agreeable to participate at this time  Therapy to focus on functional transfers and bed mobility  Pt completing supine>sit @ Mod A x's 2  Pt sitting EOB while maintaining F+ balance  Attempted STS x 3 from EOB>RW with minimal success with Max A x's 2  Pt able to barely clear buttocks this date  Pt then returned to supine @ Max x's 2 and was then put into the chair position in the [facility] Bed  Pt completing STS x's 3 from EOB chair position with A x's 4 (2 blocking knees and all 4 assisting with STS), when pushing from arm rest Pt better able to clear buttocks although not enough to reach standing  Pt unable to transition hands to RW  Last attempt was Pt completing STS from chair position and pulling up from RW with A x's 4, Pt unsuccessful with clearing buttocks from bed  Pt making minimal progress towards STS transfers  Pt unable to clear buttocks despite multiple attempts and variations  Therapy spoke with nursing and encouraged nursing team to transfer Pt to recliner chair via lyndsey lift   Pt continues to present with decrease activity tolerance, decrease standing balance, decrease sitting balance, decrease performance during ADL tasks, decrease safety awareness , decrease BUE ROM, decrease UB MS, decrease generalized strength, decrease activity engagement, decrease performance during functional transfers and decrease GM control  Pt would benefit from continued acute OT services to address deficits as well as post acute rehab upon d/c from [address] [place] [address]  Recommendation: (post acute rehab)  OT Discharge Recommendation: (post acute rehab)    Pt with minimal progress towards goals despite multiple attempts and variations  Therapy utilizing available equipment  although unable to make progress  Pt would benefit from post acute therapy with increase in available equipment (parallel bars, sit-to-stand lifts)

## 2020-01-08 NOTE — OCCUPATIONAL THERAPY NOTE
[address] Progress Note     Patient Name: Love Benson  OOIYV'Y Date: 1/8/2020  Problem List  Principal Problem:    Cellulitis of right lower extremity  Active Problems:    Lymphedema    Acute metabolic encephalopathy    Transaminitis    Morbid obesity with BMI of 70 and over, adult [facility])    Skin breakdown    Elevated troponin level    Left foot pain    Acquired hypothyroidism    Anemia    Acute respiratory failure with hypoxia and hypercapnia (HCC)    Ambulatory dysfunction    Dysphagia    Hypophosphatemia    Prolonged QT interval    Folic acid deficiency            01/08/20 1038   Restrictions/Precautions   Other Precautions O2;Fall Risk   Pain Assessment   Pain Assessment No/denies pain   Pain Score No Pain   Bed Mobility   Rolling R 4  Minimal assistance   Additional items LE management; Increased time required; Bedrails   Rolling L 3  Moderate assistance  (d/t panis laying on R side of body)   Additional items Assist x 1; Increased time required;LE management; Bedrails   Supine to Sit 3  Moderate assistance   Additional items Assist x 2; Increased time required;Verbal cues;LE management; Bedrails;HOB elevated   Sit to Supine 2  Maximal assistance   Additional items Assist x 2; Increased time required;Verbal cues;LE management   Transfers   Sit to Stand 1  Dependent   Additional Comments multiple attempts to complete STS from EOB>RW, Pt barely able to clear buttocks from bed despite Max x's 2  Pt retunred to supine and then utilized the chair position feature  Pt attempting to stand from chair position, pt able to better clear buttocks from bed with B arm ret to push from but was unable to get high enough to transition hand to RW  Pt attempting to complete STS from chair position to RW with hands starting on RW, unable to achieve lifting buttocks from bed  Cognition   Overall Cognitive Status WFL   Arousal/Participation Responsive; Alert; Cooperative   Attention Within functional limits   Orientation Level Oriented X4   Memory Within functional limits   Activity Tolerance   Activity Tolerance Patient limited by fatigue;Patient tolerated treatment well   Medical Staff Made Aware Spoke with Adam Santos  Spoke with RN, Candy Alonso  Spoke with RONALDO, Fátima Mitchell,   Assessment   Assessment Pt seen for treatment session #2 this date  Pt alert and agreeable to participate at this time  Therapy to focus on functional transfers and bed mobility  Pt completing supine>sit @ Mod A x's 2  Pt sitting EOB while maintaining F+ balance  Attempted STS x 3 from EOB>RW with minimal success with Max A x's 2  Pt able to barely clear buttocks this date  Pt then returned to supine @ Max x's 2 and was then put into the chair position in the [facility] Bed  Pt completing STS x's 3 from EOB chair position with A x's 4 (2 blocking knees and all 4 assisting with STS), when pushing from arm rest Pt better able to clear buttocks although not enough to reach standing  Pt unable to transition hands to RW  Last attempt was Pt completing STS from chair position and pulling up from RW with A x's 4, Pt unsuccessful with clearing buttocks from bed  Pt making minimal progress towards STS transfers  Pt unable to clear buttocks despite multiple attempts and variations  Therapy spoke with nursing and encouraged nursing team to transfer Pt to recliner chair via lyndsey lift  Pt continues to present with decrease activity tolerance, decrease standing balance, decrease sitting balance, decrease performance during ADL tasks, decrease safety awareness , decrease BUE ROM, decrease UB MS, decrease generalized strength, decrease activity engagement, decrease performance during functional transfers and decrease GM control  Pt would benefit from continued acute OT services to address deficits as well as post acute rehab upon d/c from [address] [place] [address]  Plan   Treatment Interventions ADL retraining;Functional transfer training;UE strengthening/ROM; Endurance training;Patient/family training;Equipment evaluation/education; Compensatory technique education;Continued evaluation; Energy conservation; Activityengagement   Goal Expiration Date 01/10/20   OT Treatment Day 2   OT Frequency 3-5x/wk   Recommendation   Recommendation   (post acute rehab)   OT Discharge Recommendation   (post acute rehab)       Pt goals that were establish on initial evaluation continue to be appropriate at this time  Pt goals to be met by 1/10/20     1  Pt will demonstrate ability to complete supine<>sit @ Mod I in order to increase safety and independence during ADL tasks  2  Pt will demonstrate ability to complete UB ADLs including washing/dressing @ Mod I in order to increase performance and participation during meaningful tasks  3  Pt will demonstrate ability to complete LB dressing @ Mod A in order to increase safety and independence during meaningful tasks  4  Pt will demonstrate ability to complete toileting tasks including CM and pericare @ Mod A in order to increase safety and independence during meaningful tasks  5  Pt will demonstrate ability to complete EOB, chair, toilet/commode transfers @ Mod A in order to increase performance and participation during functional tasks  6  Pt will demonstrate ability to stand for 5 minutes while maintaining G balance with use of RW for UB support PRN  7  Pt will demonstrate ability to tolerate 30-35 minute OT session with no vc'ing for deep breathing or use of energy conservation techniques in order to increase activity tolerance during functional tasks  8  Pt will demonstrate Good carryover of use of energy conservation/compensatory strategies during ADLs and functional tasks in order to increase safety and reduce risk for falls  9  Pt will demonstrate Good attention and participation in continued evaluation of functional ambulation house hold distances in order to assist with safe d/c planning    10  Pt will attend to continued cognitive assessments 100% of the time in order to provide most appropriate d/c recommendations     11  Pt will demonstrate 100% carryover of BUE HEP in order to increase BUE MS and increase performance during functional tasks upon d/c home      Winfield Phyllis, OTR/L

## 2020-01-08 NOTE — RESPIRATORY THERAPY NOTE
Patient is refusing bipap this evening nurse notified who reports patient has consistently requested mask be removed so she can eat and drink during the night, refusing to have mask replaced on her

## 2020-01-08 NOTE — ASSESSMENT & PLAN NOTE
· With folic acid deficiency  · Received folic acid 1 mg IV x 1 dose on 01/05/2020, and continue folic acid 1 mg PO Qdaily  · Follow the CBC  · Transfuse for a hemoglobin less than 7 g/dl    Results for [facility] (MRN [id number]) as of 1/5/2020 10:22   Ref   Range 1/4/2020 04:55   Iron Latest Ref Range: 50 - 170 ug/dL 52   Ferritin Latest Ref Range: 8 - 388 ng/mL 54   Iron Saturation Latest Units: % 18   TIBC Latest Ref Range: 250 - 450 ug/dL 295   Folate Latest Ref Range: 3 1 - 17 5 ng/mL 2 4 (L)   Vitamin B-12 Latest Ref Range: 100 - 900 pg/mL 1,050 (H)

## 2020-01-08 NOTE — ASSESSMENT & PLAN NOTE
· Initially treated with IV vancomycin and IV zosyn  · Completed a 5-day course of IV zosyn  · Completed an 8-day course of IV vancomycin  · PT/OT    MRSA culture [[id number]] (Normal) Collected: 12/31/19 1925   Lab Status: Final result Specimen: Nares from Nose Updated: 01/02/20 1241    MRSA Culture Only No Methicillin Resistant Staphlyococcus aureus (MRSA) isolated

## 2020-01-09 PROBLEM — B37.2 CANDIDIASIS OF SKIN: Status: ACTIVE | Noted: 2020-01-09

## 2020-01-09 LAB
ALBUMIN SERPL BCP-MCNC: 1.8 G/DL (ref 3.5–5)
ALP SERPL-CCNC: 62 U/L (ref 46–116)
ALT SERPL W P-5'-P-CCNC: 11 U/L (ref 12–78)
ANION GAP SERPL CALCULATED.3IONS-SCNC: -1 MMOL/L (ref 4–13)
AST SERPL W P-5'-P-CCNC: 15 U/L (ref 5–45)
BASOPHILS # BLD AUTO: 0.06 THOUSANDS/ΜL (ref 0–0.1)
BASOPHILS NFR BLD AUTO: 1 % (ref 0–1)
BILIRUB SERPL-MCNC: 0.25 MG/DL (ref 0.2–1)
BUN SERPL-MCNC: 5 MG/DL (ref 5–25)
CA-I BLD-SCNC: 1.1 MMOL/L (ref 1.12–1.32)
CALCIUM SERPL-MCNC: 7.6 MG/DL (ref 8.3–10.1)
CHLORIDE SERPL-SCNC: 103 MMOL/L (ref 100–108)
CO2 SERPL-SCNC: 41 MMOL/L (ref 21–32)
CREAT SERPL-MCNC: 0.88 MG/DL (ref 0.6–1.3)
EOSINOPHIL # BLD AUTO: 0.55 THOUSAND/ΜL (ref 0–0.61)
EOSINOPHIL NFR BLD AUTO: 9 % (ref 0–6)
ERYTHROCYTE [DISTWIDTH] IN BLOOD BY AUTOMATED COUNT: 15.9 % (ref 11.6–15.1)
GFR SERPL CREATININE-BSD FRML MDRD: 72 ML/MIN/1.73SQ M
GLUCOSE SERPL-MCNC: 87 MG/DL (ref 65–140)
HCT VFR BLD AUTO: 35.2 % (ref 34.8–46.1)
HGB BLD-MCNC: 9.5 G/DL (ref 11.5–15.4)
IMM GRANULOCYTES # BLD AUTO: 0.01 THOUSAND/UL (ref 0–0.2)
IMM GRANULOCYTES NFR BLD AUTO: 0 % (ref 0–2)
LYMPHOCYTES # BLD AUTO: 1.05 THOUSANDS/ΜL (ref 0.6–4.47)
LYMPHOCYTES NFR BLD AUTO: 17 % (ref 14–44)
MAGNESIUM SERPL-MCNC: 1.9 MG/DL (ref 1.6–2.6)
MCH RBC QN AUTO: 24.4 PG (ref 26.8–34.3)
MCHC RBC AUTO-ENTMCNC: 27 G/DL (ref 31.4–37.4)
MCV RBC AUTO: 91 FL (ref 82–98)
MONOCYTES # BLD AUTO: 0.59 THOUSAND/ΜL (ref 0.17–1.22)
MONOCYTES NFR BLD AUTO: 10 % (ref 4–12)
NEUTROPHILS # BLD AUTO: 3.96 THOUSANDS/ΜL (ref 1.85–7.62)
NEUTS SEG NFR BLD AUTO: 63 % (ref 43–75)
NRBC BLD AUTO-RTO: 0 /100 WBCS
PHOSPHATE SERPL-MCNC: 3.5 MG/DL (ref 2.7–4.5)
PLATELET # BLD AUTO: 303 THOUSANDS/UL (ref 149–390)
PMV BLD AUTO: 10 FL (ref 8.9–12.7)
POTASSIUM SERPL-SCNC: 3.6 MMOL/L (ref 3.5–5.3)
PROCALCITONIN SERPL-MCNC: 0.12 NG/ML
PROT SERPL-MCNC: 6.4 G/DL (ref 6.4–8.2)
RBC # BLD AUTO: 3.89 MILLION/UL (ref 3.81–5.12)
SODIUM SERPL-SCNC: 143 MMOL/L (ref 136–145)
WBC # BLD AUTO: 6.22 THOUSAND/UL (ref 4.31–10.16)

## 2020-01-09 PROCEDURE — 82330 ASSAY OF CALCIUM: CPT | Performed by: INTERNAL MEDICINE

## 2020-01-09 PROCEDURE — 99239 HOSP IP/OBS DSCHRG MGMT >30: CPT | Performed by: INTERNAL MEDICINE

## 2020-01-09 PROCEDURE — 84100 ASSAY OF PHOSPHORUS: CPT | Performed by: INTERNAL MEDICINE

## 2020-01-09 PROCEDURE — 85025 COMPLETE CBC W/AUTO DIFF WBC: CPT | Performed by: INTERNAL MEDICINE

## 2020-01-09 PROCEDURE — 84145 PROCALCITONIN (PCT): CPT | Performed by: INTERNAL MEDICINE

## 2020-01-09 PROCEDURE — 83735 ASSAY OF MAGNESIUM: CPT | Performed by: INTERNAL MEDICINE

## 2020-01-09 PROCEDURE — 80053 COMPREHEN METABOLIC PANEL: CPT | Performed by: INTERNAL MEDICINE

## 2020-01-09 RX ORDER — MEDICAL SUPPLY, MISCELLANEOUS
1 EACH MISCELLANEOUS
Refills: 0
Start: 2020-01-09 | End: 2020-06-01

## 2020-01-09 RX ORDER — ALBUTEROL SULFATE 2.5 MG/3ML
2.5 SOLUTION RESPIRATORY (INHALATION) EVERY 4 HOURS PRN
Refills: 0
Start: 2020-01-09 | End: 2021-03-12 | Stop reason: HOSPADM

## 2020-01-09 RX ORDER — ACETAMINOPHEN 325 MG/1
650 TABLET ORAL EVERY 6 HOURS PRN
Qty: 30 TABLET | Refills: 0
Start: 2020-01-09 | End: 2021-03-12 | Stop reason: HOSPADM

## 2020-01-09 RX ORDER — LEVOTHYROXINE SODIUM 0.12 MG/1
125 TABLET ORAL
Refills: 0
Start: 2020-01-10

## 2020-01-09 RX ORDER — FOLIC ACID 1 MG/1
1 TABLET ORAL DAILY
Refills: 0
Start: 2020-01-10

## 2020-01-09 RX ORDER — NYSTATIN 100000 [USP'U]/G
POWDER TOPICAL 2 TIMES DAILY
Qty: 15 G | Refills: 0
Start: 2020-01-09 | End: 2020-06-01

## 2020-01-09 RX ORDER — AMMONIUM LACTATE 12 G/100G
LOTION TOPICAL 2 TIMES DAILY
Qty: 400 G | Refills: 0
Start: 2020-01-09 | End: 2020-06-01

## 2020-01-09 RX ORDER — POTASSIUM CHLORIDE 20 MEQ/1
40 TABLET, EXTENDED RELEASE ORAL ONCE
Status: COMPLETED | OUTPATIENT
Start: 2020-01-09 | End: 2020-01-09

## 2020-01-09 RX ADMIN — NYSTATIN: 100000 POWDER TOPICAL at 18:49

## 2020-01-09 RX ADMIN — NYSTATIN: 100000 POWDER TOPICAL at 08:49

## 2020-01-09 RX ADMIN — LEVOTHYROXINE SODIUM 125 MCG: 125 TABLET ORAL at 06:05

## 2020-01-09 RX ADMIN — FLUOXETINE 20 MG: 20 CAPSULE ORAL at 08:49

## 2020-01-09 RX ADMIN — ENOXAPARIN SODIUM 60 MG: 60 INJECTION SUBCUTANEOUS at 08:49

## 2020-01-09 RX ADMIN — MELATONIN 1000 UNITS: at 08:49

## 2020-01-09 RX ADMIN — Medication: at 08:49

## 2020-01-09 RX ADMIN — ACETAMINOPHEN 650 MG: 325 TABLET ORAL at 22:08

## 2020-01-09 RX ADMIN — FOLIC ACID 1 MG: 1 TABLET ORAL at 08:49

## 2020-01-09 RX ADMIN — ACETAMINOPHEN 650 MG: 325 TABLET ORAL at 03:33

## 2020-01-09 RX ADMIN — ENOXAPARIN SODIUM 60 MG: 60 INJECTION SUBCUTANEOUS at 22:08

## 2020-01-09 RX ADMIN — Medication: at 18:49

## 2020-01-09 RX ADMIN — POTASSIUM CHLORIDE 40 MEQ: 1500 TABLET, EXTENDED RELEASE ORAL at 18:50

## 2020-01-09 NOTE — RESPIRATORY THERAPY NOTE
RT Protocol Note  Justice Hendrickson 61 y o  female MRN: [id number]  Unit/Bed#: -01 Encounter: [id number]    Assessment    Principal Problem:    Cellulitis of right lower extremity  Active Problems:    Lymphedema    Acute metabolic encephalopathy    Transaminitis    Morbid obesity with BMI of 70 and over, adult ([address] )    Skin breakdown    Elevated troponin level    Left foot pain    Acquired hypothyroidism    Anemia    Acute respiratory failure with hypoxia and hypercapnia ([facility])    Ambulatory dysfunction    Dysphagia    Hypophosphatemia    Prolonged QT interval    Folic acid deficiency      Home Pulmonary Medications:    Home Devices/Therapy: BiPAP/CPAP    Past Medical History:   Diagnosis Date    Disease of thyroid gland     Lymphedema     Morbid obesity due to excess calories ([facility] )     Renal disorder      Social History     Socioeconomic History    Marital status: /Civil Union     Spouse name: None    Number of children: None    Years of education: None    Highest education level: None   Occupational History    None   Social Needs    Financial resource strain: None    Food insecurity:     Worry: None     Inability: None    Transportation needs:     Medical: None     Non-medical: None   Tobacco Use    Smoking status: Never Smoker    Smokeless tobacco: Never Used   Substance and Sexual Activity    Alcohol use: Not Currently    Drug use: Never    Sexual activity: Not Currently   Lifestyle    Physical activity:     Days per week: None     Minutes per session: None    Stress: None   Relationships    Social connections:     Talks on phone: None     Gets together: None     Attends [employer] service: None     Active member of club or organization: None     Attends meetings of clubs or organizations: None     Relationship status: None    Intimate partner violence:     Fear of current or ex partner: None     Emotionally abused: None     Physically abused: None     Forced sexual activity: None Other Topics Concern    None   Social History Narrative    None       Subjective    Subjective Data: denies need for treatment    Objective    Physical Exam:   Assessment Type: Assess only  General Appearance: Awake  Respiratory Pattern: Normal, Dyspnea with exertion  Chest Assessment: Chest expansion symmetrical, Trachea midline  Bilateral Breath Sounds: Diminished, Clear  Cough: None  O2 Device: 3L NC    Vitals:  Blood pressure 153/72, pulse 90, temperature 99 1 °F (37 3 °C), resp  rate 18, height 5' 3" (1 6 m), weight (!) 194 kg (426 lb 9 6 oz), SpO2 97 %  Results from last 7 days   Lab Units 01/04/20  1009   PH ART  7 344*   PCO2 ART mm Hg 70 3*   PO2 ART mm Hg 81 3   HCO3 ART mmol/L 37 4*   BASE EXC ART mmol/L 9 6   O2 CONTENT ART mL/dL 14 7*   O2 HGB, ARTERIAL % 94 2   ABG SOURCE  Radial, Left   TRACE TEST  Yes       Imaging and other studies: I have personally reviewed pertinent reports  O2 Device: 3L NC     Plan    Respiratory Plan: No distress/Pulmonary history        Resp Comments: Pt requested to use bipap (settings 12/5 with 4L O2) for HS   Pt tolerating currently

## 2020-01-09 NOTE — SOCIAL WORK
Ailyn Curtis, liaison from [facility] here to see pt  As per Ailyn Curtis, they are willing to accept pt, they have initiated ins auth and are awaiting approval   Ailyn Curtis will update CM as soon as they hear back from insurance company

## 2020-01-09 NOTE — EMTALA/ACUTE CARE TRANSFER
[facility]  [address]  [place] [address] [place] [address]  Dept: [phone]      ACUTE CARE TRANSFER CONSENT    NAME Love TAVAREZ 1960                              MRN [id number]    I have been informed of my rights regarding examination, treatment, and transfer   by Dr Quentin Enriquez,     Benefits:      Risks:        Consent for Transfer:  I acknowledge that my medical condition has been evaluated and explained to me by the treating physician or other qualified medical person and/or my attending physician, who has recommended that I be transferred to the service of    at [address], [address] : [facility]  The above potential benefits of such transfer, the potential risks associated with such transfer, and the probable risks of not being transferred have been explained to me, and I fully understand them  The doctor has explained that, in my case, the benefits of transfer outweigh the risks  I agree to be transferred  I authorize the performance of emergency medical procedures and treatments upon me in both transit and upon arrival at the receiving facility  Additionally, I authorize the release of any and all medical records to the receiving facility and request they be transported with me, if possible  I understand that the safest mode of transportation during a medical emergency is an ambulance and that the Hospital advocates the use of this mode of transport  Risks of traveling to the receiving facility by car, including absence of medical control, life sustaining equipment, such as oxygen, and medical personnel has been explained to me and I fully understand them  (TESSIE CORRECT BOX BELOW)  [  ]  I consent to the stated transfer and to be transported by ambulance/helicopter    [  ]  I consent to the stated transfer, but refuse transportation by ambulance and accept full responsibility for my transportation by car  I understand the risks of non-ambulance transfers and I exonerate the Hospital and its staff from any deterioration in my condition that results from this refusal     X___________________________________________    DATE  20  TIME________  Signature of patient or legally responsible individual signing on patient behalf           RELATIONSHIP TO PATIENT_________________________          Provider Certification    NAME Reid Huddleston                                         1960                              MRN [id number]    A medical screening exam was performed on the above named patient  Based on the examination:    Condition Necessitating Transfer extensive wound care and rehab    Patient Condition:      Reason for Transfer:      Transfer Requirements: [address] [facility]   · Space available and qualified personnel available for treatment as acknowledged by [place]  · Agreed to accept transfer and to provide appropriate medical treatment as acknowledged by          · Appropriate medical records of the examination and treatment of the patient are provided at the time of transfer   [address] [facility], [address] _______  · Transfer will be performed by qualified personnel from    and appropriate transfer equipment as required, including the use of necessary and appropriate life support measures      Provider Certification: I have examined the patient and explained the following risks and benefits of being transferred/refusing transfer to the patient/family:         Based on these reasonable risks and benefits to the patient and/or the unborn child(cindy), and based upon the information available at the time of the patients examination, I certify that the medical benefits reasonably to be expected from the provision of appropriate medical treatments at another medical facility outweigh the increasing risks, if any, to the individuals medical condition, and in the case of labor to the unborn child, from effecting the transfer      X____________________________________________ DATE 01/09/20        TIME_______      ORIGINAL - SEND TO MEDICAL RECORDS   COPY - SEND WITH PATIENT DURING TRANSFER

## 2020-01-09 NOTE — CASE MANAGEMENT
Patient was approved for [address]: Medically is ready to go  Completed non emergent ambulance transport request for [facility] and faxed to [address] [place] [phone]  Lewis back from [address] [facility],[place] [address] approved authorization is _ [address]    [facility] can accept today:    Liaison provided contact numbers for MD/MD handoff at [phone]  MD Nico Salcido will be calling  [facility] for [facility]off  Nursing has been updated on where to call report and fax information is under dc navigator  CM left a message on Mr Bere Gambino phone  Spoke to patient and her  will be coming in about 1700  Encouraged Roula to call family and update them  Medical necessity form was completed and copy was place in the chart and on in the bin  Called [facility] to arrange transportation time  Await a call back  Auth for [facility]ACH-- [id number]    Transportation time has been set up for 5:45- 6:00 PM  Liaisons is aware, nursing

## 2020-01-09 NOTE — CASE MANAGEMENT
Clinical information was forward for [facility] to submit for authorization this AM  [facility] has submitted for the auth await determination  Back up dc plan is [place]  Will follow up in the Am

## 2020-01-09 NOTE — PLAN OF CARE
Problem: Potential for Falls  Goal: Patient will remain free of falls  Description  INTERVENTIONS:  - Assess patient frequently for physical needs  -  Identify cognitive and physical deficits and behaviors that affect risk of falls    -  [place] fall precautions as indicated by assessment   - Educate patient/family on patient safety including physical limitations  - Instruct patient to call for assistance with activity based on assessment  - Modify environment to reduce risk of injury  - Consider OT/PT consult to assist with strengthening/mobility  Outcome: Not Progressing     Problem: Prexisting or High Potential for Compromised Skin Integrity  Goal: Skin integrity is maintained or improved  Description  INTERVENTIONS:  - Identify patients at risk for skin breakdown  - Assess and monitor skin integrity  - Assess and monitor nutrition and hydration status  - Monitor labs   - Assess for incontinence   - Turn and reposition patient  - Assist with mobility/ambulation  - Relieve pressure over bony prominences  - Avoid friction and shearing  - Provide appropriate hygiene as needed including keeping skin clean and dry  - Evaluate need for skin moisturizer/barrier cream  - Collaborate with interdisciplinary team   - Patient/family teaching  - Consider wound care consult   Outcome: Not Progressing     Problem: PAIN - ADULT  Goal: Verbalizes/displays adequate comfort level or baseline comfort level  Description  Interventions:  - Encourage patient to monitor pain and request assistance  - Assess pain using appropriate pain scale  - Administer analgesics based on type and severity of pain and evaluate response  - Implement non-pharmacological measures as appropriate and evaluate response  - Consider cultural and social influences on pain and pain management  - Notify physician/advanced practitioner if interventions unsuccessful or patient reports new pain  Outcome: Not Progressing     Problem: INFECTION - ADULT  Goal: Absence or prevention of progression during hospitalization  Description  INTERVENTIONS:  - Assess and monitor for signs and symptoms of infection  - Monitor lab/diagnostic results  - Administer medications as ordered  - Instruct and encourage patient and family to use good hand hygiene technique  - Identify and instruct in appropriate isolation precautions for identified infection/condition   Outcome: Not Progressing     Problem: SAFETY ADULT  Goal: Maintain or return to baseline ADL function  Description  INTERVENTIONS:  -  Assess patient's ability to carry out ADLs; assess patient's baseline for ADL function and identify physical deficits which impact ability to perform ADLs (bathing, care of mouth/teeth, toileting, grooming, dressing, etc )  - Assess/evaluate cause of self-care deficits   - Assess range of motion  - Assess patient's mobility; develop plan if impaired  - Assess patient's need for assistive devices and provide as appropriate  - Encourage maximum independence but intervene and supervise when necessary  - Involve family in performance of ADLs  - Assess for home care needs following discharge   - Consider OT consult to assist with ADL evaluation and planning for discharge  - Provide patient education as appropriate  Outcome: Not Progressing  Goal: Maintain or return mobility status to optimal level  Description  INTERVENTIONS:  - Assess patient's baseline mobility status (ambulation, transfers, stairs, etc )    - Identify cognitive and physical deficits and behaviors that affect mobility  - Identify mobility aids required to assist with transfers and/or ambulation (gait belt, sit-to-stand, lift, walker, cane, etc )  - [place] fall precautions as indicated by assessment  - Record patient progress and toleration of activity level on Mobility SBAR; progress patient to next Phase/Stage  - Instruct patient to call for assistance with activity based on assessment  - Consider rehabilitation consult to assist with strengthening/weightbearing, etc   Outcome: Not Progressing     Problem: DISCHARGE PLANNING  Goal: Discharge to home or other facility with appropriate resources  Description  INTERVENTIONS:  - Identify barriers to discharge w/patient and caregiver  - Arrange for needed discharge resources and transportation as appropriate  - Identify discharge learning needs (meds, wound care, etc )  - Arrange for interpretive services to assist at discharge as needed  - Refer to Case Management Department for coordinating discharge planning if the patient needs post-hospital services based on physician/advanced practitioner order or complex needs related to functional status, cognitive ability, or social support system  Outcome: Not Progressing     Problem: Knowledge Deficit  Goal: Patient/family/caregiver demonstrates understanding of disease process, treatment plan, medications, and discharge instructions  Description  Complete learning assessment and assess knowledge base    Interventions:  - Provide teaching at level of understanding  - Provide teaching via preferred learning methods  Outcome: Not Progressing

## 2020-01-09 NOTE — SOCIAL WORK
Received call from [facility]) Ana Elise [phone], Pt was denied by insurance however facility is recommending Peer to Peer review  Will discuss w/ MD for areas of concern re: extensive needs of wound, as well Pt's weight impacting care and c-o)-morbidities  Review needs to be completed by Friday 1/10/2020 at 1600hrs, to call [phone]  [place] text Dr Akin Mena to please place call for Peer to Peer review, await outcome  46 Dr Akin Mena completed Peer to Peer review and Pt was approved for [facility], [facility]  CM to follow up with [facility] for planning  Pt accepted at [facility] for today  Pt aware, call placed to domingo REIS and NSG updated  Call to [place] for S transport, medical necessity completed

## 2020-01-10 VITALS
WEIGHT: 293 LBS | RESPIRATION RATE: 15 BRPM | BODY MASS INDEX: 51.91 KG/M2 | HEART RATE: 84 BPM | SYSTOLIC BLOOD PRESSURE: 116 MMHG | HEIGHT: 63 IN | TEMPERATURE: 98.4 F | OXYGEN SATURATION: 96 % | DIASTOLIC BLOOD PRESSURE: 71 MMHG

## 2020-01-10 PROCEDURE — 94660 CPAP INITIATION&MGMT: CPT

## 2020-01-10 PROCEDURE — 94760 N-INVAS EAR/PLS OXIMETRY 1: CPT

## 2020-01-10 PROCEDURE — 99232 SBSQ HOSP IP/OBS MODERATE 35: CPT | Performed by: NURSE PRACTITIONER

## 2020-01-10 RX ADMIN — FLUOXETINE 20 MG: 20 CAPSULE ORAL at 08:56

## 2020-01-10 RX ADMIN — LEVOTHYROXINE SODIUM 125 MCG: 125 TABLET ORAL at 06:05

## 2020-01-10 RX ADMIN — ENOXAPARIN SODIUM 60 MG: 60 INJECTION SUBCUTANEOUS at 08:56

## 2020-01-10 RX ADMIN — FOLIC ACID 1 MG: 1 TABLET ORAL at 08:56

## 2020-01-10 RX ADMIN — NYSTATIN: 100000 POWDER TOPICAL at 18:27

## 2020-01-10 RX ADMIN — MELATONIN 1000 UNITS: at 08:56

## 2020-01-10 RX ADMIN — ACETAMINOPHEN 650 MG: 325 TABLET ORAL at 19:59

## 2020-01-10 RX ADMIN — Medication: at 18:27

## 2020-01-10 RX ADMIN — NYSTATIN: 100000 POWDER TOPICAL at 08:59

## 2020-01-10 RX ADMIN — Medication: at 08:59

## 2020-01-10 RX ADMIN — ENOXAPARIN SODIUM 60 MG: 60 INJECTION SUBCUTANEOUS at 20:00

## 2020-01-10 NOTE — ASSESSMENT & PLAN NOTE
· Resolved; Present on admission; highly suspect secondary to acute infection and secondary to hypoxia as well as hypercapnia

## 2020-01-10 NOTE — SOCIAL WORK
SAYRA spoke with Deepthi Ceja at [facility], the earliest transport time they can find is tomorrow, 1/11/20 at 1000 through [address] [facility] [address]  via Bariatric ambulance for pt to be transferred to [address] spoke with Shay Whlaey at [facility] who gave authorization # [id number] for non urgent ambulance transport for pt to be transported tomorrow, 1/11/20

## 2020-01-10 NOTE — DISCHARGE SUMMARY
Discharge- Kathleen Laura 1960, 61 y o  female MRN: [id number]    Unit/Bed#: -01 Encounter: [id number]    Primary Care Provider: Connor Alvarez DO   Date and time admitted to hospital: 12/28/2019 12:14 PM        * Cellulitis of right lower extremity  Assessment & Plan  · Initially treated with IV vancomycin and IV zosyn  · Completed a 5-day course of IV zosyn  · Completed an 8-day course of IV vancomycin    MRSA culture [[id number]] (Normal) Collected: 12/31/19 1925   Lab Status: Final result Specimen: Nares from Nose Updated: 01/02/20 1241    MRSA Culture Only No Methicillin Resistant Staphlyococcus aureus (MRSA) isolated         Acute respiratory failure with hypoxia and hypercapnia (HCC)  Assessment & Plan  · Probable undiagnosed obesity-hypoventilation syndrome and severe obstructive sleep apnea  · The patient has been initiated on Bipap QHS and anytime while sleeping for a goal oxygen saturation of 90% and above  · Utilize supplemental oxygen via the nasal cannula to maintain oxygen saturation levels at 90% and above  · She will need an outpatient sleep study completed as soon as possible to check for obstructive sleep apnea  · The patient was seen in consultation by Pulmonology  · Outpatient Pulmonology evaluation  · Outpatient Bariatric Surgery evaluation    Results for [facility] (MRN [id number]) as of 1/4/2020 13:47   Ref   Range 1/4/2020 10:09   pH, Arterial Latest Ref Range: 7 350 - 7 450  7 344 (L)   pCO2, Arterial Latest Ref Range: 36 0 - 44 0 mm Hg 70 3 (HH)   pO2, Arterial Latest Ref Range: 75 0 - 129 0 mm Hg 81 3   HCO3, Arterial Latest Ref Range: 22 0 - 28 0 mmol/L 37 4 (H)   Base Excess, Arterial Latest Units: mmol/L 9 6   O2 Content, Arterial Latest Ref Range: 16 0 - 23 0 mL/dL 14 7 (L)   O2 HGB,Arterial Latest Ref Range: 94 0 - 97 0 % 94 2   ABG SOURCE Unknown Radial, Left   NASAL CANNULA Unknown 3L       Acute metabolic encephalopathy  Assessment & Plan  · Present on admission  · Likely secondary to acute infection and secondary to hypoxia as well as hypercapnia  · The patient's neurologic status has improved    Ambulatory dysfunction  Assessment & Plan  · The patient was evaluated by physical therapy and occupational therapy during the hospitalization  · The patient was discharged to the [facility] at [address] [place] [address]    Candidiasis of skin  Assessment & Plan  · Utilize nystatin powder under the abdominal skin folds and in the bilateral inguinal region    Folic acid deficiency  Assessment & Plan  · Received folic acid 1 mg IV x 1 dose on 01/05/2020, and continue folic acid 1 mg PO Qdaily  Outpatient follow-up with PCP in regards to this matter      Prolonged QT interval  Assessment & Plan  · Avoid all QT-prolonging agents  · Follow the QT interval  · Outpatient Cardiology evaluation    ECG 12 lead   Order: [id number]   Status:  Final result   Visible to patient:  No (Inaccessible in [address])   Next appt:  None    Ref Range & Units 1/7/20 0840   Ventricular Rate BPM 93    Atrial Rate BPM 93    ND Interval ms 176    QRSD Interval ms 108    QT Interval ms 376    QTC Interval ms 467    P [place] degrees 68    QRS Axis degrees 70    T Wave Axis degrees 35       Narrative & Impression     Normal sinus rhythm  Non-specific intra-ventricular conduction delay  When compared with ECG of 29-DEC-2019 11:57,  Nonspecific T wave abnormality, improved in Inferior leads  T wave inversion no longer evident in Anterior leads  Confirmed by [address] [place] [address] ([id number]) on 1/7/2020 6:59:02 PM               Hypophosphatemia  Assessment & Plan  · Resolved with PO phosphorus supplementation  · Follow the phosphorus level after discharge    Dysphagia  Assessment & Plan  · The patient was evaluated by speech therapy  · Continue a regular diet with thin liquids per speech therapy's recommendations  · Aspiration precautions at all times    Anemia  Assessment & Plan  · With folic acid deficiency  · Received folic acid 1 mg IV x 1 dose on 01/05/2020, and continue folic acid 1 mg PO Qdaily  · Follow the CBC after discharge  · Transfuse for a hemoglobin less than 7 g/dl    Results for [facility] (MRN [id number]) as of 1/5/2020 10:22   Ref   Range 1/4/2020 04:55   Iron Latest Ref Range: 50 - 170 ug/dL 52   Ferritin Latest Ref Range: 8 - 388 ng/mL 54   Iron Saturation Latest Units: % 18   TIBC Latest Ref Range: 250 - 450 ug/dL 295   Folate Latest Ref Range: 3 1 - 17 5 ng/mL 2 4 (L)   Vitamin B-12 Latest Ref Range: 100 - 900 pg/mL 1,050 (H)       Acquired hypothyroidism  Assessment & Plan  · Continue her home dose of PO levothyroxine  Outpatient follow-up with PCP in regards to this matter      Left foot pain  Assessment & Plan  · Secondary to fall during the hospitalization    XR foot 2 vw left   Status: Final result   PACS Images      Show images for XR foot 2 vw left   Study Result     LEFT FOOT     INDICATION:   left foot pain, trauma, can be done portable      COMPARISON:  None     VIEWS:  XR FOOT 2 VW LEFT         FINDINGS:     There is no acute fracture or dislocation      No significant degenerative changes      No lytic or blastic lesions seen      Soft tissues are unremarkable      IMPRESSION:     No acute osseous abnormality         XR tibia fibula 2 vw left   Status: Final result   PACS Images      Show images for XR tibia fibula 2 vw left   Study Result     LEFT TIBIA AND FIBULA     INDICATION:   left ankle pain, can be done portable      COMPARISON:  None     VIEWS:  XR TIBIA FIBULA 2 VW LEFT         FINDINGS:     There is no acute fracture or dislocation      Advanced knee joint osteoarthritis is present      No lytic or blastic lesions are seen      Soft tissues are unremarkable      IMPRESSION:     No acute osseous abnormality             Elevated troponin level  Assessment & Plan  · Likely secondary to acute infection  · Outpatient Cardiology evaluation    Echo complete with contrast if indicated Status: Final result   PACS Images      Show images for Echo complete with contrast if indicated   Study Result     [address]  [facility], [address] [place] [address]     Transthoracic Echocardiogram  2D, M-mode, Doppler, and Color Doppler     Study date:  2020     Patient: Diana Carlisle  MR number: [id number]  Account number: [de-identified]  : 1960  Age: 61 years  Gender: Female  Status: Inpatient  Location: [facility] Echo Lab  Height: 63 in  Weight: 426 lb  BP: 134/ 66 mmHg     Indications: MI     Diagnoses: I21 4 - Non-ST elevation (NSTEMI) myocardial infarction     Sonographer:  Nels Frankel, RCS  Primary Physician:  Marnie Gallo DO  Referring Physician:  Karen Wynn DO  Group:  [facility]  Interpreting Physician:  Aretha Escalera DO     IMPRESSIONS:  Technically difficult study     SUMMARY     LEFT VENTRICLE:  Difficult to assess complete regional wal motion  Mildly abnormal septal motion, more consistant with conduction delay  Size was normal   Systolic function was normal  Ejection fraction was estimated in the range of 50 % to 55 %  This study was inadequate for the evaluation of regional wall motion  Wall thickness was mildly increased      TRICUSPID VALVE:  There was trace regurgitation      HISTORY: PRIOR HISTORY: Elevated troponins, morbid obesity     PROCEDURE: The study was performed in the [facility] Echo Lab  This was a routine study  The transthoracic approach was used  The study included complete 2D imaging, M-mode, complete spectral Doppler, and color Doppler  The heart  rate was 87 bpm, at the start of the study  Images were obtained from the parasternal, apical, subcostal, and suprasternal notch acoustic windows  Intravenous contrast ( 1 0 ml of Definity) was administered to opacify the left ventricle    Echocardiographic views were limited due to restricted patient mobility, poor patient compliance, poor acoustic window availability, and decreased penetration  This was a technically difficult study      LEFT VENTRICLE: Difficult to assess complete regional wal motion  Mildly abnormal septal motion, more consistant with conduction delay  Size was normal  Systolic function was normal  Ejection fraction was estimated in the range of 50 % to  55 %  This study was inadequate for the evaluation of regional wall motion  Wall thickness was mildly increased  DOPPLER: Left ventricular diastolic function parameters were normal for the patient's age      RIGHT VENTRICLE: The size was normal  Systolic function was normal      LEFT ATRIUM: Size was normal      RIGHT ATRIUM: Size was normal      MITRAL VALVE: Valve structure was normal  DOPPLER: There was no significant regurgitation      AORTIC VALVE: The valve was probably trileaflet  DOPPLER: There was no significant regurgitation      TRICUSPID VALVE: The valve structure was normal  DOPPLER: There was trace regurgitation      PULMONIC VALVE: DOPPLER: There was no evidence for stenosis  There was no regurgitation      PERICARDIUM: There was no pericardial effusion      AORTA: The root exhibited normal size      SYSTEM MEASUREMENT TABLES     2D  %FS: 29 38 %  AV Diam: 2 58 cm  EDV(Teich): 72 64 ml  EF(Teich): 56 81 %  ESV(Teich): 31 37 ml  IVSd: 1 35 cm  LVEDV MOD A4C: 97 9 ml  LVEF MOD A4C: 65 45 %  LVESV MOD A4C: 33 83 ml  LVIDd: 4 06 cm  LVIDs: 2 87 cm  LVLd A4C: 8 48 cm  LVLs A4C: 6 48 cm  LVPWd: 1 06 cm  RWT: 0 52  SV MOD A4C: 64 08 ml  SV(Teich): 41 27 ml     PW  E': 0 14 m/s  E/E': 7 27  MV A Philip: 0 93 m/s  MV Dec Sheridan: 11 15 m/s2  MV DecT: 92 15 ms  MV E Philip: 1 03 m/s  MV E/A Ratio: 1 11     [employer] Accredited Echocardiography Laboratory     Prepared and electronically signed by  Ramana Watkins DO  Signed 03-Jan-2020 10:27:55            Skin breakdown  Assessment & Plan  · The patient was evaluated by the Wound Care nurse with the following impressions/recommendations:  Wound Care     Bilateral lower legs: wash legs with soap and water and a towel avoiding wounds to remove dead skin build up  Irrigate wounds with normal saline and pat dry  Apply thick layer of Silvasorb gel directly to wound base or apply to gauze then place directly on wound  Cover with 4x4 gauze and an ABD then secure with Kerlix and paper tape  Change once daily or as needed for strike through drainage       Right medial thigh: Apply skin prep to periwound and allow to dry  Loosely tuck 1/4" Iodoform packing strip into wound and use a small piece of tape to secure tail to skin  Cover with Allevyn non bordered foam and change daily or as needed for strike through drainage       Right posterior knee: Wash with soap and water then pat dry  Apply Maxorb Ag Rope to to inner aspect of knee, Change daily or as needed for strikethrough drainage       Right Lower Abdomen: Irrigate with normal saline then pat dry  Line underside of pannus with Maxorb Ag Rope and change daily       Wash skin folds with soap and water then pat dry  Dust skin folds underneath breasts, bilateral groin and torso with Nystatin powder three times daily       Apply Lac-Hydrin (ammonium lactate) lotion to bilateral legs avoiding wound base one to two times a day      Skin care Plan:  1-Protect sacrum w/Allevyn foam, luis enrique with P, change q3d and PRN, check skin q-shift  2-Turn/reposition q2h or when medically stable for pressure re-distribution on skin   3-Elevate heels to offload pressure]  4-Moisturize skin daily with skin nourishing cream  5-Ehob cushion in chair when out of bed  6-Hydraguard to bilateral heels BID and PRN        Morbid obesity with BMI of 70 and over, adult [facility])  Assessment & Plan  · Lifestyle modifications are recommended including weight loss, improving her diet, and increasing her amount of activity    · She will need an outpatient sleep study completed as soon as possible to check for obstructive sleep apnea  · Outpatient Bariatric Surgery evaluation    Transaminitis  Assessment & Plan  · Improved  · Avoid all hepatotoxic agents  · Follow the LFT's (liver function tests) after discharge    Results for [facility] [place] (MRN [id number]) as of 1/2/2020 14:45   Ref  Range 1/1/2020 06:22   HEPATITIS A IGM ANTIBODY Latest Ref Range: Non-reactive, Equivocal-Suggest Recollect  Non-reactive   HEPATITIS B SURFACE ANTIGEN Latest Ref Range: Non-reactive, NonReactive - Confirmed  Non-reactive   HEPATITIS B CORE IGM ANTIBODY Latest Ref Range: Non-reactive  Non-reactive   HEPATITIS C ANTIBODY Latest Ref Range: Non-reactive  Non-reactive       Lymphedema  Assessment & Plan  · Chronic lymphedema of the bilateral lower extremities  · Outpatient lymphedema therapy        Discharging Physician / Practitioner: Akiko Hyman DO  PCP: Carsisa Lion DO  Admission Date:   Admission Orders (From admission, onward)     Ordered        12/28/19 1407  Inpatient Admission (expected length of stay for this patient Order details is greater than two midnights)  Once                   Discharge Date: 01/09/20    Consultations During Hospital Stay:  · General Surgery  · Pulmonology  · Wound Care nurse    Procedures Performed:   · None    Significant Findings / Test Results:   Xr Chest 2 Views    Result Date: 12/29/2019  Impression: Mild cardiomegaly and borderline vascular congestion No localized infiltrates identified Workstation performed: [id number]     Xr Tibia Fibula 2 Vw Left    Result Date: 1/2/2020  Impression: No acute osseous abnormality  Workstation performed: [id number]     Xr Foot 2 Vw Left    Result Date: 1/2/2020  Impression: No acute osseous abnormality   Workstation performed: [id number]     Ct Head Without Contrast    Result Date: 12/28/2019  Impression: No acute intracranial hemorrhage, mass effect or edema Workstation performed: [id number]     Ct Tibia Fibula Right W Contrast    Result Date: 12/29/2019  Impression: Extensive right lower extremity subcutaneous edema consistent with nonspecific cellulitis including a focal anteromedial subcutaneous 4 5 x 2 4 x 3 6 cm collection concerning for abscess formation  No underlying osseous destructive change to indicate osteomyelitis at this time  The study was marked in [facility] for immediate notification  Workstation performed: [id number]     ECG 12 lead   Order: [id number]   Status:  Final result   Visible to patient:  No (Inaccessible in [address])   Next appt:  None   (important suggestion)  Newer results are available  Click to view them now  Ref Range & Units 12/28/19 1224   Ventricular Rate BPM 81    Atrial Rate BPM 81    MA Interval ms 170    QRSD Interval ms 108    QT Interval ms 472    QTC Interval ms 548    P Axis degrees 73    QRS Axis degrees 101    T Wave Axis degrees 190       Narrative & Impression     Normal sinus rhythm  Rightward axis  T wave abnormality, consider inferior ischemia  T wave abnormality, consider anterolateral ischemia  Prolonged QT  Abnormal ECG  No previous ECGs available  Confirmed by Emma Campos ([id number]) on 12/30/2019 9:43:56 AM      Specimen Collected: 12/28/19 12:24   Last Resulted: 12/30/19 09:44           ECG 12 lead   Order: [id number]   Status:  Final result   Visible to patient:  No (Inaccessible in [address])   Next appt:  None   (important suggestion)  Newer results are available  Click to view them now      Ref Range & Units 12/29/19 1157   Ventricular Rate BPM 74    Atrial Rate BPM 74    MA Interval ms 194    QRSD Interval ms 108    QT Interval ms 436    QTC Interval ms 483    P [place] degrees 29    QRS Axis degrees 87    T Wave Axis degrees 89       Narrative & Impression     Normal sinus rhythm  Low voltage QRS  T wave abnormality, consider anterior ischemia  Prolonged QT  Abnormal ECG  When compared with ECG of 28-DEC-2019 12:24, (unconfirmed)  Nonspecific T wave abnormality has replaced inverted T waves in Inferior leads  QT has shortened  Confirmed by Noelle Pierre ([id number]) on 2019 9:39:09 AM      Specimen Collected: 19 11:57   Last Resulted: 19 09:39           ECG 12 lead   Order: [id number]   Status:  Final result   Visible to patient:  No (Inaccessible in [address])   Next appt:  None    Ref Range & Units 20 0840   Ventricular Rate BPM 93    Atrial Rate BPM 93    MS Interval ms 176    QRSD Interval ms 108    QT Interval ms 376    QTC Interval ms 467    P [place] degrees 68    QRS Axis degrees 70    T Wave Axis degrees 35       Narrative & Impression     Normal sinus rhythm  Non-specific intra-ventricular conduction delay  When compared with ECG of 29-DEC-2019 11:57,  Nonspecific T wave abnormality, improved in Inferior leads  T wave inversion no longer evident in Anterior leads  Confirmed by Aliyah Monterroso ([id number]) on 2020 6:59:02 PM      Specimen Collected: 20 08:40   Last Resulted: 20 18:59              Echo complete with contrast if indicated   Status: Final result   PACS Images      Show images for Echo complete with contrast if indicated   Study Result     [address]     Transthoracic Echocardiogram  2D, M-mode, Doppler, and Color Doppler     Study date:  2020     Patient: Kiara Langley  MR number: [id number]  Account number: [de-identified]  : 1960  Age: 61 years  Gender: Female  Status: Inpatient  Location: [facility] Echo Lab  Height: 63 in  Weight: 426 lb  BP: 134/ 66 mmHg     Indications: MI     Diagnoses: I21 4 - Non-ST elevation (NSTEMI) myocardial infarction     Sonographer:  HAILEY Valencia  Primary Physician:  Lorelei Hale DO  Referring Physician:  Kenneth Ortega DO  Group:  [facility]  Interpreting Physician:  Gwenlyn Lefort Kelli Power, DO     IMPRESSIONS:  Technically difficult study     SUMMARY     LEFT VENTRICLE:  Difficult to assess complete regional wal motion  Mildly abnormal septal motion, more consistant with conduction delay  Size was normal   Systolic function was normal  Ejection fraction was estimated in the range of 50 % to 55 %  This study was inadequate for the evaluation of regional wall motion  Wall thickness was mildly increased      TRICUSPID VALVE:  There was trace regurgitation      HISTORY: PRIOR HISTORY: Elevated troponins, morbid obesity     PROCEDURE: The study was performed in the [facility] Echo Lab  This was a routine study  The transthoracic approach was used  The study included complete 2D imaging, M-mode, complete spectral Doppler, and color Doppler  The heart  rate was 87 bpm, at the start of the study  Images were obtained from the parasternal, apical, subcostal, and suprasternal notch acoustic windows  Intravenous contrast ( 1 0 ml of Definity) was administered to opacify the left ventricle  Echocardiographic views were limited due to restricted patient mobility, poor patient compliance, poor acoustic window availability, and decreased penetration  This was a technically difficult study      LEFT VENTRICLE: Difficult to assess complete regional wal motion  Mildly abnormal septal motion, more consistant with conduction delay  Size was normal  Systolic function was normal  Ejection fraction was estimated in the range of 50 % to  55 %  This study was inadequate for the evaluation of regional wall motion  Wall thickness was mildly increased  DOPPLER: Left ventricular diastolic function parameters were normal for the patient's age      RIGHT VENTRICLE: The size was normal  Systolic function was normal      LEFT ATRIUM: Size was normal      RIGHT ATRIUM: Size was normal      MITRAL VALVE: Valve structure was normal  DOPPLER: There was no significant regurgitation      AORTIC VALVE: The valve was probably trileaflet   DOPPLER: There was no significant regurgitation      TRICUSPID VALVE: The valve structure was normal  DOPPLER: There was trace regurgitation      PULMONIC VALVE: DOPPLER: There was no evidence for stenosis  There was no regurgitation      PERICARDIUM: There was no pericardial effusion      AORTA: The root exhibited normal size      SYSTEM MEASUREMENT TABLES     2D  %FS: 29 38 %  AV Diam: 2 58 cm  EDV(Teich): 72 64 ml  EF(Teich): 56 81 %  ESV(Teich): 31 37 ml  IVSd: 1 35 cm  LVEDV MOD A4C: 97 9 ml  LVEF MOD A4C: 65 45 %  LVESV MOD A4C: 33 83 ml  LVIDd: 4 06 cm  LVIDs: 2 87 cm  LVLd A4C: 8 48 cm  LVLs A4C: 6 48 cm  LVPWd: 1 06 cm  RWT: 0 52  SV MOD A4C: 64 08 ml  SV(Teich): 41 27 ml     PW  E': 0 14 m/s  E/E': 7 27  MV A Philip: 0 93 m/s  MV Dec C[place]: 11 15 m/s2  MV DecT: 92 15 ms  MV E Philip: 1 03 m/s  MV E/A Ratio: 1 11     [facility] Accredited Echocardiography Laboratory     Prepared and electronically signed by  Margot Donaldson DO  Signed 03-Jan-2020 10:27:55          Incidental Findings:   · None    Test Results Pending at Discharge (will require follow up): · None     Outpatient Tests Requested:  · CBC with diff , CMP, Mag, Phos, Procalcitonin in 1 week    Complications:  None    Reason for Admission:  Cellulitis of the right lower extremity    Hospital Course:     Zully Alicea is a 61 y o  female patient who originally presented to the hospital on 12/28/2019 due to an infection involving the right lower extremity  Please see above list of diagnoses and related plan for additional information  Condition at Discharge: stable     Discharge Day Visit / Exam:     Subjective: The patient was seen and examined  The patient is doing better  No chest pain  No shortness of breath  No abdominal pain  No nausea or vomiting      Vitals: Blood Pressure: 138/70 (01/09/20 1531)  Pulse: 82 (01/09/20 1531)  Temperature: 98 5 °F (36 9 °C) (01/09/20 1531)  Temp Source: Oral (12/28/19 1610)  Respirations: 15 (01/09/20 1531)  Height: 5' 3" (160 cm) (12/30/19 1502)  Weight - Scale: (!) 194 kg (426 lb 9 6 oz) (12/28/19 1221)  SpO2: 96 % (01/09/20 1531)  Exam:   Physical Exam  General:  NAD, awake, alert, follows commands  HEENT:  NC/AT, mucous membranes moist  Neck:  Supple, No JVP elevation  CV:  + S1, + S2, RRR  Pulm:  Lung fields are CTA bilaterally  Abd:  Soft, Non-tender, Non-distended  Ext:  No clubbing/cyanosis, Edema of the bilateral lower extremities  Skin:  Venous stasis dermatitis of the bilateral anterior shins    Discharge instructions/Information to patient and family:   See after visit summary for information provided to patient and family  Provisions for Follow-Up Care:  See after visit summary for information related to follow-up care and any pertinent home health orders  Disposition:     Long Term Acute Care (LTAC) Facility at [facility] Statement:  I spent 40 minutes discharging the patient  This time was spent on the day of discharge  I had direct contact with the patient on the day of discharge  Greater than 50% of the total time was spent examining patient, answering all patient questions, arranging and discussing plan of care with patient as well as directly providing post-discharge instructions  Additional time then spent on discharge activities  Discharge Medications:  See after visit summary for reconciled discharge medications provided to patient and family        ** Please Note: This note has been constructed using a voice recognition system **

## 2020-01-10 NOTE — ASSESSMENT & PLAN NOTE
· The patient was evaluated by physical therapy and occupational therapy during the hospitalization  · The patient will be discharged to the [facility], [facility]

## 2020-01-10 NOTE — CASE MANAGEMENT
Aware of transportation issue: CM called at this time to [facility]ter spoke to [employer] and [facility] transport is working on rearranging their schedule to assist as they have a [employer]  Anticipate this transport will be this afternoon per [facility]  This was communicated to [facility] for [facility]     Await confirmation of transport time

## 2020-01-10 NOTE — PROGRESS NOTES
Progress Note - Renny Mitchell 1960, 61 y o  female MRN: [id number]    Unit/Bed#: -01 Encounter: [id number]    Primary Care Provider: Marianna Lehman DO   Date and time admitted to hospital: 12/28/2019 12:14 PM    * Cellulitis of right lower extremity  Assessment & Plan  · Initially treated with IV vancomycin and IV zosyn  · Completed a 5-day course of IV zosyn  · Completed an 8-day course of IV vancomycin  · Medically stable for discharge to SNF; transport has been arranged and the earliest it can be done is 1/11/2020    MRSA culture [[id number]] (Normal) Collected: 12/31/19 1925   Lab Status: Final result Specimen: Nares from Nose Updated: 01/02/20 1241    MRSA Culture Only No Methicillin Resistant Staphlyococcus aureus (MRSA) isolated         Candidiasis of skin  Assessment & Plan  · Utilize nystatin powder under the abdominal skin folds and in the bilateral inguinal region    Folic acid deficiency  Assessment & Plan  · Received folic acid 1 mg IV x 1 dose on 01/05/2020, and continue folic acid 1 mg PO Qdaily  Outpatient follow-up with PCP in regards to this matter      Prolonged QT interval  Assessment & Plan  · Avoid all QT-prolonging agents  · Follow the QT interval  · Outpatient Cardiology evaluation    ECG 12 lead   Order: [id number]   Status:  Final result   Visible to patient:  No (Inaccessible in [address])   Next appt:  None    Ref Range & Units 1/7/20 0840   Ventricular Rate BPM 93    Atrial Rate BPM 93    ME Interval ms 176    QRSD Interval ms 108    QT Interval ms 376    QTC Interval ms 467    P [place] degrees 68    QRS Axis degrees 70    T Wave Axis degrees 35       Narrative & Impression     Normal sinus rhythm  Non-specific intra-ventricular conduction delay  When compared with ECG of 29-DEC-2019 11:57,  Nonspecific T wave abnormality, improved in Inferior leads  T wave inversion no longer evident in Anterior leads  Confirmed by Rona John ([id number]) on 1/7/2020 6:59:02 PM Hypophosphatemia  Assessment & Plan  · Resolved with PO phosphorus supplementation  · Follow the phosphorus level after discharge    Dysphagia  Assessment & Plan  · The patient was evaluated by speech therapy  · Continue a regular diet with thin liquids per speech therapy's recommendations  · Aspiration precautions at all times    Ambulatory dysfunction  Assessment & Plan  · The patient was evaluated by physical therapy and occupational therapy during the hospitalization  · The patient will be discharged to the [facility] at [address] [facility]    Acute respiratory failure with hypoxia and hypercapnia ([facility] [address] )  Assessment & Plan  · Probable undiagnosed obesity-hypoventilation syndrome and severe obstructive sleep apnea  · Initiated on Bipap QHS and anytime while sleeping for a goal oxygen saturation of 90% and above  · Utilize supplemental oxygen via the nasal cannula to maintain oxygen saturation levels at 90% and above  · Outpatient sleep study completed as soon as possible to check for obstructive sleep apnea  · The patient was seen in consultation by Pulmonology while inpatient  · Outpatient Pulmonology evaluation  · Outpatient Bariatric Surgery evaluation    Results for [facility] (MRN [id number]) as of 1/4/2020 13:47   Ref   Range 1/4/2020 10:09   pH, Arterial Latest Ref Range: 7 350 - 7 450  7 344 (L)   pCO2, Arterial Latest Ref Range: 36 0 - 44 0 mm Hg 70 3 (HH)   pO2, Arterial Latest Ref Range: 75 0 - 129 0 mm Hg 81 3   HCO3, Arterial Latest Ref Range: 22 0 - 28 0 mmol/L 37 4 (H)   Base Excess, Arterial Latest Units: mmol/L 9 6   O2 Content, Arterial Latest Ref Range: 16 0 - 23 0 mL/dL 14 7 (L)   O2 HGB,Arterial Latest Ref Range: 94 0 - 97 0 % 94 2   ABG SOURCE Unknown Radial, Left   NASAL CANNULA Unknown 3L       Anemia  Assessment & Plan  · With folic acid deficiency  · Received folic acid 1 mg IV x 1 dose on 01/05/2020, and continue folic acid 1 mg PO Qdaily  · Follow the CBC after discharge  · Transfuse for a hemoglobin less than 7 g/dl    Results for [facility]  [facility] (MRN [id number]) as of 1/5/2020 10:22   Ref   Range 1/4/2020 04:55   Iron Latest Ref Range: 50 - 170 ug/dL 52   Ferritin Latest Ref Range: 8 - 388 ng/mL 54   Iron Saturation Latest Units: % 18   TIBC Latest Ref Range: 250 - 450 ug/dL 295   Folate Latest Ref Range: 3 1 - 17 5 ng/mL 2 4 (L)   Vitamin B-12 Latest Ref Range: 100 - 900 pg/mL 1,050 (H)       Acquired hypothyroidism  Assessment & Plan  · Continue her home dose of PO levothyroxine  Outpatient follow-up with PCP in regards to this matter      Left foot pain  Assessment & Plan  · Secondary to fall during the hospitalization    XR foot 2 vw left   Status: Final result   PACS Images      Show images for XR foot 2 vw left   Study Result     LEFT FOOT     INDICATION:   left foot pain, trauma, can be done portable      COMPARISON:  None     VIEWS:  XR FOOT 2 VW LEFT         FINDINGS:     There is no acute fracture or dislocation      No significant degenerative changes      No lytic or blastic lesions seen      Soft tissues are unremarkable      IMPRESSION:     No acute osseous abnormality         XR tibia fibula 2 vw left   Status: Final result   PACS Images      Show images for XR tibia fibula 2 vw left   Study Result     LEFT TIBIA AND FIBULA     INDICATION:   left ankle pain, can be done portable      COMPARISON:  None     VIEWS:  XR TIBIA FIBULA 2 VW LEFT         FINDINGS:     There is no acute fracture or dislocation      Advanced knee joint osteoarthritis is present      No lytic or blastic lesions are seen      Soft tissues are unremarkable      IMPRESSION:     No acute osseous abnormality             Elevated troponin level  Assessment & Plan  · Likely secondary to acute infection  · Outpatient Cardiology evaluation    Echo complete with contrast if indicated   Status: Final result   PACS Images      Show images for Echo complete with contrast if indicated   Study Result     [address]  [address] [place] [address]     Transthoracic Echocardiogram  2D, M-mode, Doppler, and Color Doppler     Study date:  2020     Patient: Yon Machado  MR number: [id number]  Account number: [de-identified]  : 1960  Age: 61 years  Gender: Female  Status: Inpatient  Location: [facility]  Height: 63 in  Weight: 426 lb  BP: 134/ 66 mmHg     Indications: MI     Diagnoses: I21 4 - Non-ST elevation (NSTEMI) myocardial infarction     Sonographer:  Deloria Kawasaki, RCS  Primary Physician:  Feng De La Vega DO  Referring Physician:  Christine Lea DO  Group:  [facility]  Interpreting Physician:  Katherin Kim DO     IMPRESSIONS:  Technically difficult study     SUMMARY     LEFT VENTRICLE:  Difficult to assess complete regional wal motion  Mildly abnormal septal motion, more consistant with conduction delay  Size was normal   Systolic function was normal  Ejection fraction was estimated in the range of 50 % to 55 %  This study was inadequate for the evaluation of regional wall motion  Wall thickness was mildly increased      TRICUSPID VALVE:  There was trace regurgitation      HISTORY: PRIOR HISTORY: Elevated troponins, morbid obesity     PROCEDURE: The study was performed in the [facility]  This was a routine study  The transthoracic approach was used  The study included complete 2D imaging, M-mode, complete spectral Doppler, and color Doppler  The heart  rate was 87 bpm, at the start of the study  Images were obtained from the parasternal, apical, subcostal, and suprasternal notch acoustic windows  Intravenous contrast ( 1 0 ml of Definity) was administered to opacify the left ventricle  Echocardiographic views were limited due to restricted patient mobility, poor patient compliance, poor acoustic window availability, and decreased penetration   This was a technically difficult study      LEFT VENTRICLE: Difficult to assess complete regional wal motion  Mildly abnormal septal motion, more consistant with conduction delay  Size was normal  Systolic function was normal  Ejection fraction was estimated in the range of 50 % to  55 %  This study was inadequate for the evaluation of regional wall motion  Wall thickness was mildly increased  DOPPLER: Left ventricular diastolic function parameters were normal for the patient's age      RIGHT VENTRICLE: The size was normal  Systolic function was normal      LEFT ATRIUM: Size was normal      RIGHT ATRIUM: Size was normal      MITRAL VALVE: Valve structure was normal  DOPPLER: There was no significant regurgitation      AORTIC VALVE: The valve was probably trileaflet  DOPPLER: There was no significant regurgitation      TRICUSPID VALVE: The valve structure was normal  DOPPLER: There was trace regurgitation      PULMONIC VALVE: DOPPLER: There was no evidence for stenosis  There was no regurgitation      PERICARDIUM: There was no pericardial effusion      AORTA: The root exhibited normal size      SYSTEM MEASUREMENT TABLES     2D  %FS: 29 38 %  AV Diam: 2 58 cm  EDV(Teich): 72 64 ml  EF(Teich): 56 81 %  ESV(Teich): 31 37 ml  IVSd: 1 35 cm  LVEDV MOD A4C: 97 9 ml  LVEF MOD A4C: 65 45 %  LVESV MOD A4C: 33 83 ml  LVIDd: 4 06 cm  LVIDs: 2 87 cm  LVLd A4C: 8 48 cm  LVLs A4C: 6 48 cm  LVPWd: 1 06 cm  RWT: 0 52  SV MOD A4C: 64 08 ml  SV(Teich): 41 27 ml     PW  E': 0 14 m/s  E/E': 7 27  MV A Philip: 0 93 m/s  MV Dec Laclede: 11 15 m/s2  MV DecT: 92 15 ms  MV E Philip: 1 03 m/s  MV E/A Ratio: 1 11     [employer] Accredited Echocardiography Laboratory     Prepared and electronically signed by  Trenton Gallegos DO  Signed 03-Jan-2020 10:27:55            Skin breakdown  Assessment & Plan  · The patient was evaluated by the Wound Care nurse with the following impressions/recommendations:  Wound Care     Bilateral lower legs: wash legs with soap and water and a towel avoiding wounds to remove dead skin build up  Irrigate wounds with normal saline and pat dry  Apply thick layer of Silvasorb gel directly to wound base or apply to gauze then place directly on wound  Cover with 4x4 gauze and an ABD then secure with Kerlix and paper tape  Change once daily or as needed for strike through drainage       Right medial thigh: Apply skin prep to periwound and allow to dry  Loosely tuck 1/4" Iodoform packing strip into wound and use a small piece of tape to secure tail to skin  Cover with Allevyn non bordered foam and change daily or as needed for strike through drainage       Right posterior knee: Wash with soap and water then pat dry  Apply Maxorb Ag Rope to to inner aspect of knee, Change daily or as needed for strikethrough drainage       Right Lower Abdomen: Irrigate with normal saline then pat dry  Line underside of pannus with Maxorb Ag Rope and change daily       Wash skin folds with soap and water then pat dry  Dust skin folds underneath breasts, bilateral groin and torso with Nystatin powder three times daily       Apply Lac-Hydrin (ammonium lactate) lotion to bilateral legs avoiding wound base one to two times a day      Skin care Plan:  1-Protect sacrum w/Allevyn foam, luis enrique with P, change q3d and PRN, check skin q-shift  2-Turn/reposition q2h or when medically stable for pressure re-distribution on skin   3-Elevate heels to offload pressure]  4-Moisturize skin daily with skin nourishing cream  5-Ehob cushion in chair when out of bed  6-Hydraguard to bilateral heels BID and PRN        Morbid obesity with BMI of 70 and over, adult [facility])  Assessment & Plan  · Lifestyle modifications are recommended including weight loss, improving her diet, and increasing her amount of activity    · She will need an outpatient sleep study completed as soon as possible to check for obstructive sleep apnea  · Outpatient Bariatric Surgery evaluation    Transaminitis  Assessment & Plan  · Improved  · Avoid all hepatotoxic agents  · Follow the LFT's (liver function tests) after discharge; recommend following up with PCP for further testing as they will be following patient on as consistent basis    Results for [facility] (MRN [id number]) as of 1/2/2020 14:45   Ref  Range 1/1/2020 06:22   HEPATITIS A IGM ANTIBODY Latest Ref Range: Non-reactive, Equivocal-Suggest Recollect  Non-reactive   HEPATITIS B SURFACE ANTIGEN Latest Ref Range: Non-reactive, NonReactive - Confirmed  Non-reactive   HEPATITIS B CORE IGM ANTIBODY Latest Ref Range: Non-reactive  Non-reactive   HEPATITIS C ANTIBODY Latest Ref Range: Non-reactive  Non-reactive       Acute metabolic encephalopathy  Assessment & Plan  · Resolved; Present on admission; highly suspect secondary to acute infection and secondary to hypoxia as well as hypercapnia    Lymphedema  Assessment & Plan  · Chronic lymphedema of the bilateral lower extremities  · Outpatient lymphedema therapy      VTE Pharmacologic Prophylaxis:   Pharmacologic: Enoxaparin (Lovenox)  Mechanical VTE Prophylaxis in Place: Yes    Patient Centered Rounds: I have performed bedside rounds with nursing staff today  Discussions with Specialists or Other Care Team Provider: nursing staff and case management    Education and Discussions with Family / Patient: education provided at the bedside regarding plan of care as noted above    Time Spent for Care: 30 minutes  More than 50% of total time spent on counseling and coordination of care as described above      Current Length of Stay: 13 day(s)    Current Patient Status: Inpatient   Certification Statement: The patient will continue to require additional inpatient hospital stay due to transfer to facility arranged for tomoroe     Discharge Plan: 1/11 transfer to LTAC    Code Status: Level 1 - Full Code      Subjective:   Patient is without complaint and reports significant improvement in health during stay     Objective: Vitals:   Temp (24hrs), Av 3 °F (36 8 °C), Min:98 °F (36 7 °C), Max:98 5 °F (36 9 °C)    Temp:  [98 °F (36 7 °C)-98 5 °F (36 9 °C)] 98 °F (36 7 °C)  HR:  [80-95] 80  Resp:  [15-21] 17  BP: (126-138)/(63-70) 126/64  SpO2:  [95 %-97 %] 95 %  Body mass index is 75 57 kg/m²  Input and Output Summary (last 24 hours): Intake/Output Summary (Last 24 hours) at 1/10/2020 1214  Last data filed at 1/10/2020 0100  Gross per 24 hour   Intake 480 ml   Output 500 ml   Net -20 ml       Physical Exam:     Physical Exam   Constitutional: She is oriented to person, place, and time  She is cooperative  No distress  Cardiovascular: Normal rate, regular rhythm and intact distal pulses  Pulses:       Radial pulses are 2+ on the right side, and 2+ on the left side  Posterior tibial pulses are 1+ on the right side, and 1+ on the left side  Pulmonary/Chest: Effort normal and breath sounds normal  No respiratory distress  She has no wheezes  Abdominal: Soft  Bowel sounds are normal  She exhibits no distension  There is no tenderness  Neurological: She is alert and oriented to person, place, and time  Skin: Skin is warm  Capillary refill takes less than 2 seconds  She is not diaphoretic  Psychiatric: She has a normal mood and affect   Her behavior is normal          Additional Data:     Labs:    Results from last 7 days   Lab Units 20  0449   WBC Thousand/uL 6 22   HEMOGLOBIN g/dL 9 5*   HEMATOCRIT % 35 2   PLATELETS Thousands/uL 303   NEUTROS PCT % 63   LYMPHS PCT % 17   MONOS PCT % 10   EOS PCT % 9*     Results from last 7 days   Lab Units 20  0449   SODIUM mmol/L 143   POTASSIUM mmol/L 3 6   CHLORIDE mmol/L 103   CO2 mmol/L 41*   BUN mg/dL 5   CREATININE mg/dL 0 88   ANION GAP mmol/L -1*   CALCIUM mg/dL 7 6*   ALBUMIN g/dL 1 8*   TOTAL BILIRUBIN mg/dL 0 25   ALK PHOS U/L 62   ALT U/L 11*   AST U/L 15   GLUCOSE RANDOM mg/dL 87                 Results from last 7 days   Lab Units 01/09/20  0449 01/06/20  0540   PROCALCITONIN ng/ml 0 12 0 13           * I Have Reviewed All Lab Data Listed Above  * Additional Pertinent Lab Tests Reviewed: Patricio 66 Admission Reviewed    Imaging:    Imaging Reports Reviewed Today Include:  XR X3 and CT  Imaging Personally Reviewed by Myself Includes:  none    Recent Cultures (last 7 days):           Last 24 Hours Medication List:     Current Facility-Administered Medications:  acetaminophen 650 mg Oral Q6H PRN Elenore Gull, DO   albuterol 2 5 mg Nebulization Q4H PRN Elenore Gull, DO   ammonium lactate  Topical BID Elenore Gull, DO   cholecalciferol 1,000 Units Oral Daily Elenore Gull, DO   enoxaparin 60 mg Subcutaneous Q12H [facility] Hilariojason SantanaKeisha, DO   FLUoxetine 20 mg Oral Daily Elenore Gull, DO   folic acid 1 mg Oral Daily Elenore Gull, DO   levothyroxine 125 mcg Oral Early Morning Elenore Gull, DO   nystatin  Topical BID Huma Piedra MD   ondansetron 4 mg Intravenous Q8H PRN RICK Barnes        Today, Patient Was Seen By: RICK Lopez    ** Please Note: Dictation voice to text software may have been used in the creation of this document   **

## 2020-01-10 NOTE — ASSESSMENT & PLAN NOTE
· Present on admission  · Likely secondary to acute infection and secondary to hypoxia as well as hypercapnia  · The patient's neurologic status has improved 1-2 drinks

## 2020-01-10 NOTE — ASSESSMENT & PLAN NOTE
· Avoid all QT-prolonging agents  · Follow the QT interval  · Outpatient Cardiology evaluation    ECG 12 lead   Order: [id number]   Status:  Final result   Visible to patient:  No (Inaccessible in [address])   Next appt:  None    Ref Range & Units 1/7/20 0840   Ventricular Rate BPM 93    Atrial Rate BPM 93    CT Interval ms 176    QRSD Interval ms 108    QT Interval ms 376    QTC Interval ms 467    P [place] degrees 68    QRS Axis degrees 70    T Wave Axis degrees 35       Narrative & Impression     Normal sinus rhythm  Non-specific intra-ventricular conduction delay  When compared with ECG of 29-DEC-2019 11:57,  Nonspecific T wave abnormality, improved in Inferior leads  T wave inversion no longer evident in Anterior leads  Confirmed by [address] [place] [address] ([id number]) on 1/7/2020 6:59:02 PM

## 2020-01-10 NOTE — ASSESSMENT & PLAN NOTE
· Avoid all QT-prolonging agents  · Follow the QT interval  · Outpatient Cardiology evaluation    ECG 12 lead   Order: [id number]   Status:  Final result   Visible to patient:  No (Inaccessible in [address])   Next appt:  None    Ref Range & Units 1/7/20 0840   Ventricular Rate BPM 93    Atrial Rate BPM 93    IA Interval ms 176    QRSD Interval ms 108    QT Interval ms 376    QTC Interval ms 467    P [place] degrees 68    QRS Axis degrees 70    T Wave Axis degrees 35       Narrative & Impression     Normal sinus rhythm  Non-specific intra-ventricular conduction delay  When compared with ECG of 29-DEC-2019 11:57,  Nonspecific T wave abnormality, improved in Inferior leads  T wave inversion no longer evident in Anterior leads  Confirmed by [address] [place] [address] ([id number]) on 1/7/2020 6:59:02 PM

## 2020-01-10 NOTE — ASSESSMENT & PLAN NOTE
· Probable undiagnosed obesity-hypoventilation syndrome and severe obstructive sleep apnea  · Initiated on Bipap QHS and anytime while sleeping for a goal oxygen saturation of 90% and above  · Utilize supplemental oxygen via the nasal cannula to maintain oxygen saturation levels at 90% and above  · Outpatient sleep study completed as soon as possible to check for obstructive sleep apnea  · The patient was seen in consultation by Pulmonology while inpatient  · Outpatient Pulmonology evaluation  · Outpatient Bariatric Surgery evaluation    Results for [facility] (MRN [id number]) as of 1/4/2020 13:47   Ref   Range 1/4/2020 10:09   pH, Arterial Latest Ref Range: 7 350 - 7 450  7 344 (L)   pCO2, Arterial Latest Ref Range: 36 0 - 44 0 mm Hg 70 3 (HH)   pO2, Arterial Latest Ref Range: 75 0 - 129 0 mm Hg 81 3   HCO3, Arterial Latest Ref Range: 22 0 - 28 0 mmol/L 37 4 (H)   Base Excess, Arterial Latest Units: mmol/L 9 6   O2 Content, Arterial Latest Ref Range: 16 0 - 23 0 mL/dL 14 7 (L)   O2 HGB,Arterial Latest Ref Range: 94 0 - 97 0 % 94 2   ABG SOURCE Unknown Radial, Left   NASAL CANNULA Unknown 3L

## 2020-01-10 NOTE — ASSESSMENT & PLAN NOTE
· The patient was evaluated by physical therapy and occupational therapy during the hospitalization  · The patient was discharged to the [facility]

## 2020-01-10 NOTE — ASSESSMENT & PLAN NOTE
· With folic acid deficiency  · Received folic acid 1 mg IV x 1 dose on 01/05/2020, and continue folic acid 1 mg PO Qdaily  · Follow the CBC after discharge  · Transfuse for a hemoglobin less than 7 g/dl    Results for [facility] (MRN [id number]) as of 1/5/2020 10:22   Ref   Range 1/4/2020 04:55   Iron Latest Ref Range: 50 - 170 ug/dL 52   Ferritin Latest Ref Range: 8 - 388 ng/mL 54   Iron Saturation Latest Units: % 18   TIBC Latest Ref Range: 250 - 450 ug/dL 295   Folate Latest Ref Range: 3 1 - 17 5 ng/mL 2 4 (L)   Vitamin B-12 Latest Ref Range: 100 - 900 pg/mL 1,050 (H)

## 2020-01-10 NOTE — ASSESSMENT & PLAN NOTE
· Improved  · Avoid all hepatotoxic agents  · Follow the LFT's (liver function tests) after discharge; recommend following up with PCP for further testing as they will be following patient on as consistent basis    Results for [facility] (MRN [id number]) as of 1/2/2020 14:45   Ref   Range 1/1/2020 06:22   HEPATITIS A IGM ANTIBODY Latest Ref Range: Non-reactive, Equivocal-Suggest Recollect  Non-reactive   HEPATITIS B SURFACE ANTIGEN Latest Ref Range: Non-reactive, NonReactive - Confirmed  Non-reactive   HEPATITIS B CORE IGM ANTIBODY Latest Ref Range: Non-reactive  Non-reactive   HEPATITIS C ANTIBODY Latest Ref Range: Non-reactive  Non-reactive

## 2020-01-10 NOTE — ASSESSMENT & PLAN NOTE
· Received folic acid 1 mg IV x 1 dose on 01/05/2020, and continue folic acid 1 mg PO Qdaily  Outpatient follow-up with PCP in regards to this matter

## 2020-01-10 NOTE — ASSESSMENT & PLAN NOTE
· Initially treated with IV vancomycin and IV zosyn  · Completed a 5-day course of IV zosyn  · Completed an 8-day course of IV vancomycin  · Medically stable for discharge to SNF; transport has been arranged and the earliest it can be done is 1/11/2020    MRSA culture [[id number]] (Normal) Collected: 12/31/19 1925   Lab Status: Final result Specimen: Nares from Nose Updated: 01/02/20 1241    MRSA Culture Only No Methicillin Resistant Staphlyococcus aureus (MRSA) isolated

## 2020-01-10 NOTE — ASSESSMENT & PLAN NOTE
· Probable undiagnosed obesity-hypoventilation syndrome and severe obstructive sleep apnea  · The patient has been initiated on Bipap QHS and anytime while sleeping for a goal oxygen saturation of 90% and above  · Utilize supplemental oxygen via the nasal cannula to maintain oxygen saturation levels at 90% and above  · She will need an outpatient sleep study completed as soon as possible to check for obstructive sleep apnea  · The patient was seen in consultation by Pulmonology  · Outpatient Pulmonology evaluation  · Outpatient Bariatric Surgery evaluation    Results for [facility] (MRN [id number]) as of 1/4/2020 13:47   Ref   Range 1/4/2020 10:09   pH, Arterial Latest Ref Range: 7 350 - 7 450  7 344 (L)   pCO2, Arterial Latest Ref Range: 36 0 - 44 0 mm Hg 70 3 (HH)   pO2, Arterial Latest Ref Range: 75 0 - 129 0 mm Hg 81 3   HCO3, Arterial Latest Ref Range: 22 0 - 28 0 mmol/L 37 4 (H)   Base Excess, Arterial Latest Units: mmol/L 9 6   O2 Content, Arterial Latest Ref Range: 16 0 - 23 0 mL/dL 14 7 (L)   O2 HGB,Arterial Latest Ref Range: 94 0 - 97 0 % 94 2   ABG SOURCE Unknown Radial, Left   NASAL CANNULA Unknown 3L

## 2020-01-10 NOTE — ASSESSMENT & PLAN NOTE
· Improved  · Avoid all hepatotoxic agents  · Follow the LFT's (liver function tests) after discharge    Results for [facility] (MRN [id number]) as of 1/2/2020 14:45   Ref   Range 1/1/2020 06:22   HEPATITIS A IGM ANTIBODY Latest Ref Range: Non-reactive, Equivocal-Suggest Recollect  Non-reactive   HEPATITIS B SURFACE ANTIGEN Latest Ref Range: Non-reactive, NonReactive - Confirmed  Non-reactive   HEPATITIS B CORE IGM ANTIBODY Latest Ref Range: Non-reactive  Non-reactive   HEPATITIS C ANTIBODY Latest Ref Range: Non-reactive  Non-reactive

## 2020-01-10 NOTE — ASSESSMENT & PLAN NOTE
· Initially treated with IV vancomycin and IV zosyn  · Completed a 5-day course of IV zosyn  · Completed an 8-day course of IV vancomycin    MRSA culture [[id number]] (Normal) Collected: 12/31/19 1925   Lab Status: Final result Specimen: Nares from Nose Updated: 01/02/20 1241    MRSA Culture Only No Methicillin Resistant Staphlyococcus aureus (MRSA) isolated

## 2020-01-10 NOTE — ASSESSMENT & PLAN NOTE
· With folic acid deficiency  · Received folic acid 1 mg IV x 1 dose on 01/05/2020, and continue folic acid 1 mg PO Qdaily  · Follow the CBC after discharge  · Transfuse for a hemoglobin less than 7 g/dl    Results for [facility] (MRN [id number]) as of 1/5/2020 10:22   Ref   Range 1/4/2020 04:55   Iron Latest Ref Range: 50 - 170 ug/dL 52   Ferritin Latest Ref Range: 8 - 388 ng/mL 54   Iron Saturation Latest Units: % 18   TIBC Latest Ref Range: 250 - 450 ug/dL 295   Folate Latest Ref Range: 3 1 - 17 5 ng/mL 2 4 (L)   Vitamin B-12 Latest Ref Range: 100 - 900 pg/mL 1,050 (H)

## 2020-01-10 NOTE — ASSESSMENT & PLAN NOTE
· Likely secondary to acute infection  · Outpatient Cardiology evaluation    Echo complete with contrast if indicated   Status: Final result   PACS Images      Show images for Echo complete with contrast if indicated   Study Result     [address]  [facility], [address] [place] [address]     Transthoracic Echocardiogram  2D, M-mode, Doppler, and Color Doppler     Study date:  2020     Patient: Charlane Pallas  MR number: [id number]  Account number: [de-identified]  : 1960  Age: 61 years  Gender: Female  Status: Inpatient  Location: [facility]  Height: 63 in  Weight: 426 lb  BP: 134/ 66 mmHg     Indications: MI     Diagnoses: I21 4 - Non-ST elevation (NSTEMI) myocardial infarction     Sonographer:  Herminia Kehr, RCS  Primary Physician:  Jaymie Reilly DO  Referring Physician:  Zachary Enriquez DO  Group:  [facility]  Interpreting Physician:  Uli Zhao DO     IMPRESSIONS:  Technically difficult study     SUMMARY     LEFT VENTRICLE:  Difficult to assess complete regional wal motion  Mildly abnormal septal motion, more consistant with conduction delay  Size was normal   Systolic function was normal  Ejection fraction was estimated in the range of 50 % to 55 %  This study was inadequate for the evaluation of regional wall motion  Wall thickness was mildly increased      TRICUSPID VALVE:  There was trace regurgitation      HISTORY: PRIOR HISTORY: Elevated troponins, morbid obesity     PROCEDURE: The study was performed in the [facility]  This was a routine study  The transthoracic approach was used  The study included complete 2D imaging, M-mode, complete spectral Doppler, and color Doppler  The heart  rate was 87 bpm, at the start of the study  Images were obtained from the parasternal, apical, subcostal, and suprasternal notch acoustic windows   Intravenous contrast ( 1 0 ml of Definity) was administered to opacify the left ventricle  Echocardiographic views were limited due to restricted patient mobility, poor patient compliance, poor acoustic window availability, and decreased penetration  This was a technically difficult study      LEFT VENTRICLE: Difficult to assess complete regional wal motion  Mildly abnormal septal motion, more consistant with conduction delay  Size was normal  Systolic function was normal  Ejection fraction was estimated in the range of 50 % to  55 %  This study was inadequate for the evaluation of regional wall motion  Wall thickness was mildly increased  DOPPLER: Left ventricular diastolic function parameters were normal for the patient's age      RIGHT VENTRICLE: The size was normal  Systolic function was normal      LEFT ATRIUM: Size was normal      RIGHT ATRIUM: Size was normal      MITRAL VALVE: Valve structure was normal  DOPPLER: There was no significant regurgitation      AORTIC VALVE: The valve was probably trileaflet  DOPPLER: There was no significant regurgitation      TRICUSPID VALVE: The valve structure was normal  DOPPLER: There was trace regurgitation      PULMONIC VALVE: DOPPLER: There was no evidence for stenosis  There was no regurgitation      PERICARDIUM: There was no pericardial effusion      AORTA: The root exhibited normal size      SYSTEM MEASUREMENT TABLES     2D  %FS: 29 38 %  AV Diam: 2 58 cm  EDV(Teich): 72 64 ml  EF(Teich): 56 81 %  ESV(Teich): 31 37 ml  IVSd: 1 35 cm  LVEDV MOD A4C: 97 9 ml  LVEF MOD A4C: 65 45 %  LVESV MOD A4C: 33 83 ml  LVIDd: 4 06 cm  LVIDs: 2 87 cm  LVLd A4C: 8 48 cm  LVLs A4C: 6 48 cm  LVPWd: 1 06 cm  RWT: 0 52  SV MOD A4C: 64 08 ml  SV(Teich): 41 27 ml     PW  E': 0 14 m/s  E/E': 7 27  MV A Philip: 0 93 m/s  MV Dec Snyder: 11 15 m/s2  MV DecT: 92 15 ms  MV E Philip: 1 03 m/s  MV E/A Ratio: 1 11     [employer] Accredited Echocardiography Laboratory     Prepared and electronically signed by  Dinh Sanchez DO  Signed 03-Jan-2020 10:27:55

## 2020-01-11 PROCEDURE — 99239 HOSP IP/OBS DSCHRG MGMT >30: CPT | Performed by: INTERNAL MEDICINE

## 2020-01-11 RX ADMIN — Medication: at 08:27

## 2020-01-11 RX ADMIN — MELATONIN 1000 UNITS: at 08:24

## 2020-01-11 RX ADMIN — LEVOTHYROXINE SODIUM 125 MCG: 125 TABLET ORAL at 05:31

## 2020-01-11 RX ADMIN — FOLIC ACID 1 MG: 1 TABLET ORAL at 08:24

## 2020-01-11 RX ADMIN — FLUOXETINE 20 MG: 20 CAPSULE ORAL at 08:24

## 2020-01-11 RX ADMIN — NYSTATIN: 100000 POWDER TOPICAL at 08:27

## 2020-01-11 RX ADMIN — ENOXAPARIN SODIUM 60 MG: 60 INJECTION SUBCUTANEOUS at 08:25

## 2020-01-11 NOTE — SOCIAL WORK
CM spoke with Bridgett Parrish from [facility] who sts Jasvir Mccollum is no longer available to transport pt  Bridgett Parrish has now arranged for [facility] to transport pt today at [phone] to [facility] in [facility] the crew will be equip with appropriate bariatric liter  Attending provider, nurse, patient and [facility] notified, all are in agreement

## 2020-01-11 NOTE — DISCHARGE INSTRUCTIONS
Bilateral lower legs: wash legs with soap and water and a towel avoiding wounds to remove dead skin build up  Irrigate wounds with normal saline and pat dry  Apply thick layer of Silvasorb gel directly to wound base or apply to gauze then place directly on wound  Cover with 4x4 gauze and an ABD then secure with Kerlix and paper tape  Change once daily or as needed for strike through drainage       Right medial thigh: Apply skin prep to periwound and allow to dry  Loosely tuck 1/4" Iodoform packing strip into wound and use a small piece of tape to secure tail to skin  Cover with Allevyn non bordered foam and change daily or as needed for strike through drainage       Right posterior knee: Wash with soap and water then pat dry  Apply Maxorb Ag Rope to to inner aspect of knee, Change daily or as needed for strikethrough drainage       Right Lower Abdomen: Irrigate with normal saline then pat dry  Line underside of pannus with Maxorb Ag Rope and change daily       Wash skin folds with soap and water then pat dry  Dust skin folds underneath breasts, bilateral groin and torso with Nystatin powder three times daily       Apply Lac-Hydrin (ammonium lactate) lotion to bilateral legs avoiding wound base one to two times a day      Skin care Plan:  1-Protect sacrum w/Allevyn foam, luis enrique with P, change q3d and PRN, check skin q-shift  2-Turn/reposition q2h or when medically stable for pressure re-distribution on skin   3-Elevate heels to offload pressure]  4-Moisturize skin daily with skin nourishing cream  5-Ehob cushion in chair when out of bed    6-Hydraguard to bilateral heels BID and PRN

## 2020-01-11 NOTE — NURSING NOTE
Patient discharged via stretcher with transport team in stable condition  4L O2 intact  Report called to receiving facility  Questions were encouraged and answered   Documentation sent with transport team

## 2020-01-17 ENCOUNTER — TELEPHONE (OUTPATIENT)
Dept: BARIATRICS | Facility: CLINIC | Age: 60
End: 2020-01-17

## 2020-01-17 NOTE — TELEPHONE ENCOUNTER
Called patient to schedule an appointment per Dr Victorina Natarajan  Patients 'sons girlfriend' answered the phone and stated that she was hospitalized      I asked her if she would provide Roula with our number to schedule an appointment upon her discharge from the hospital

## 2020-01-17 NOTE — DISCHARGE SUMMARY
Discharge- Mohit Naida 1960, 61 y o  female MRN: [id number]    Unit/Bed#: -01 Encounter: [id number]    Primary Care Provider: Chaim Arreaga DO   Date and time admitted to hospital: 12/28/2019 12:14 PM    * Cellulitis of right lower extremity  Assessment & Plan  · Initially treated with IV vancomycin and IV zosyn  · Completed a 5-day course of IV zosyn  · Completed an 8-day course of IV vancomycin  · Medically stable for discharge to     MRSA culture [[id number]] (Normal) Collected: 12/31/19 1925   Lab Status: Final result Specimen: Nares from Nose Updated: 01/02/20 1241    MRSA Culture Only No Methicillin Resistant Staphlyococcus aureus (MRSA) isolated     Acute metabolic encephalopathy  Assessment & Plan  · Resolved; Present on admission; highly suspect secondary to acute infection and secondary to hypoxia as well as hypercapnia    Lymphedema  Assessment & Plan  · Chronic lymphedema of the bilateral lower extremities  · Outpatient lymphedema therapy    Ambulatory dysfunction  Assessment & Plan  · The patient was evaluated by physical therapy and occupational therapy during the hospitalization  · The patient will be discharged to the [facility] at [address] [place] [address]    Acute respiratory failure with hypoxia and hypercapnia ([facility] [address] )  Assessment & Plan  · Probable undiagnosed obesity-hypoventilation syndrome and severe obstructive sleep apnea  · Initiated on Bipap QHS and anytime while sleeping for a goal oxygen saturation of 90% and above  · Utilize supplemental oxygen via the nasal cannula to maintain oxygen saturation levels at 90% and above  · Outpatient sleep study completed as soon as possible to check for obstructive sleep apnea  · The patient was seen in consultation by Pulmonology while inpatient  · Outpatient Pulmonology evaluation  · Outpatient Bariatric Surgery evaluation    Results for [facility] (MRN [id number]) as of 1/4/2020 13:47   Ref   Range 1/4/2020 10:09   pH, Arterial Latest Ref Range: 7 350 - 7 450  7 344 (L)   pCO2, Arterial Latest Ref Range: 36 0 - 44 0 mm Hg 70 3 (HH)   pO2, Arterial Latest Ref Range: 75 0 - 129 0 mm Hg 81 3   HCO3, Arterial Latest Ref Range: 22 0 - 28 0 mmol/L 37 4 (H)   Base Excess, Arterial Latest Units: mmol/L 9 6   O2 Content, Arterial Latest Ref Range: 16 0 - 23 0 mL/dL 14 7 (L)   O2 HGB,Arterial Latest Ref Range: 94 0 - 97 0 % 94 2   ABG SOURCE Unknown Radial, Left   NASAL CANNULA Unknown 3L       Left foot pain  Assessment & Plan  · Secondary to fall during the hospitalization    XR foot 2 vw left   Status: Final result   PACS Images      Show images for XR foot 2 vw left   Study Result     LEFT FOOT     INDICATION:   left foot pain, trauma, can be done portable      COMPARISON:  None     VIEWS:  XR FOOT 2 VW LEFT         FINDINGS:     There is no acute fracture or dislocation      No significant degenerative changes      No lytic or blastic lesions seen      Soft tissues are unremarkable      IMPRESSION:     No acute osseous abnormality         XR tibia fibula 2 vw left   Status: Final result   PACS Images      Show images for XR tibia fibula 2 vw left   Study Result     LEFT TIBIA AND FIBULA     INDICATION:   left ankle pain, can be done portable      COMPARISON:  None     VIEWS:  XR TIBIA FIBULA 2 VW LEFT         FINDINGS:     There is no acute fracture or dislocation      Advanced knee joint osteoarthritis is present      No lytic or blastic lesions are seen      Soft tissues are unremarkable      IMPRESSION:     No acute osseous abnormality         Skin breakdown  Assessment & Plan  · The patient was evaluated by the Wound Care nurse with the following impressions/recommendations:  Wound Care     Bilateral lower legs: wash legs with soap and water and a towel avoiding wounds to remove dead skin build up  Irrigate wounds with normal saline and pat dry   Apply thick layer of Silvasorb gel directly to wound base or apply to gauze then place directly on wound  Cover with 4x4 gauze and an ABD then secure with Kerlix and paper tape  Change once daily or as needed for strike through drainage       Right medial thigh: Apply skin prep to periwound and allow to dry  Loosely tuck 1/4" Iodoform packing strip into wound and use a small piece of tape to secure tail to skin  Cover with Allevyn non bordered foam and change daily or as needed for strike through drainage       Right posterior knee: Wash with soap and water then pat dry  Apply Maxorb Ag Rope to to inner aspect of knee, Change daily or as needed for strikethrough drainage       Right Lower Abdomen: Irrigate with normal saline then pat dry  Line underside of pannus with Maxorb Ag Rope and change daily       Wash skin folds with soap and water then pat dry  Dust skin folds underneath breasts, bilateral groin and torso with Nystatin powder three times daily       Apply Lac-Hydrin (ammonium lactate) lotion to bilateral legs avoiding wound base one to two times a day      Skin care Plan:  1-Protect sacrum w/Allevyn foam, luis enrique with P, change q3d and PRN, check skin q-shift  2-Turn/reposition q2h or when medically stable for pressure re-distribution on skin   3-Elevate heels to offload pressure]  4-Moisturize skin daily with skin nourishing cream  5-Ehob cushion in chair when out of bed  6-Hydraguard to bilateral heels BID and PRN        Morbid obesity with BMI of 70 and over, adult [facility])  Assessment & Plan  · Lifestyle modifications are recommended including weight loss, improving her diet, and increasing her amount of activity    · She will need an outpatient sleep study completed as soon as possible to check for obstructive sleep apnea  · Outpatient Bariatric Surgery evaluation      Discharging Physician / Practitioner: Lorayne Gaucher, MD  PCP: Trinidad Castillo DO  Admission Date:   Admission Orders (From admission, onward)     Ordered        12/28/19 1407  Inpatient Admission (expected length of stay for this patient Order details is greater than two midnights)  Once                   Discharge Date: 01/11/20    Disposition:      LTAC (Long Term Acute Care) at [facility]:   · Not Applicable to this Patient - Not Applicable to this Patient    Reason for Admission: cellulitis    Discharge Diagnoses:     Please see assessment and plan section above for further details regarding discharge diagnoses  Resolved Problems  Date Reviewed: 1/10/2020    None          Consultations During Hospital Stay:  [address]   IP CONSULT TO ACUTE CARE SURGERY  IP CONSULT TO PULMONOLOGY     Procedures Performed:   * No surgery found *      Xr Chest 2 Views    Result Date: 12/29/2019  Narrative: CHEST INDICATION: Altered mental status COMPARISON:  None EXAM PERFORMED/VIEWS:  XR CHEST PA & LATERAL Images: 2 FINDINGS: Cardiomediastinal silhouette appears unremarkable  Mild cardiomegaly and borderline vascular congestion  No pneumothorax or pleural effusion  Osseous structures appear within normal limits for patient age  Impression: Mild cardiomegaly and borderline vascular congestion No localized infiltrates identified Workstation performed: [id number]     Xr Tibia Fibula 2 Vw Left    Result Date: 1/2/2020  Narrative: LEFT TIBIA AND FIBULA INDICATION:   left ankle pain, can be done portable  COMPARISON:  None VIEWS:  XR TIBIA FIBULA 2 VW LEFT FINDINGS: There is no acute fracture or dislocation  Advanced knee joint osteoarthritis is present  No lytic or blastic lesions are seen  Soft tissues are unremarkable  Impression: No acute osseous abnormality  Workstation performed: [id number]     Xr Foot 2 Vw Left    Result Date: 1/2/2020  Narrative: LEFT FOOT INDICATION:   left foot pain, trauma, can be done portable  COMPARISON:  None VIEWS:  XR FOOT 2 VW LEFT FINDINGS: There is no acute fracture or dislocation  No significant degenerative changes   No lytic or blastic lesions seen  Soft tissues are unremarkable  Impression: No acute osseous abnormality  Workstation performed: [id number]     Ct Head Without Contrast    Result Date: 12/28/2019  Narrative: CT BRAIN - WITHOUT CONTRAST INDICATION:   Altered mental status   (pt's  called ems stating pt has been slurring speech and increased weakness for past couple days  pt being tx per leg wounds by VN  denies fevers/n/v/d) COMPARISON:  None  TECHNIQUE:  CT examination of the brain was performed  In addition to axial images, coronal 2D reformatted images were created and submitted for interpretation  Radiation dose length product (DLP) for this visit:  961 mGy-cm   This examination, like all CT scans performed in the [facility], was performed utilizing techniques to minimize radiation dose exposure, including the use of iterative reconstruction and automated exposure control  IMAGE QUALITY:  Diagnostic  FINDINGS: PARENCHYMA:  No intracranial mass, mass effect or midline shift  No CT signs of acute infarction  No acute parenchymal hemorrhage  VENTRICLES AND EXTRA-AXIAL SPACES:  Normal for the patient's age  VISUALIZED ORBITS AND PARANASAL SINUSES:  Unremarkable  CALVARIUM AND EXTRACRANIAL SOFT TISSUES:  Normal      Impression: No acute intracranial hemorrhage, mass effect or edema Workstation performed: [id number]     Ct Tibia Fibula Right W Contrast    Result Date: 12/29/2019  Narrative: CT right tibia-fibula with IV contrast INDICATION: Lower leg swelling/redness, cellulitis suspected  COMPARISON: None  TECHNIQUE: CT examination of the right tibia-fibula was performed  This examination, like all CT scans performed in the [facility], was performed utilizing techniques to minimize radiation dose exposure, including the use of iterative reconstruction and automated exposure control software    Sagittal and coronal two dimensional reconstructed images were also submitted for interpretation  IV Contrast: 100 mL of iohexol (OMNIPAQUE) Rad dose  1011 mGy-cm FINDINGS: OSSEOUS STRUCTURES:  No fracture, dislocation or destructive osseous lesion  Prominent degenerative change noted across the knee joint with productive spurring and joint space narrowing noted diffusely  VISUALIZED MUSCULATURE:  Unremarkable  SOFT TISSUES:  Extensive subcutaneous edema especially anteriorly and medially at the level of the knee consistent with nonspecific soft tissue cellulitis  Along the anteromedial soft tissues at the level of the proximal third tibia there is a small peripherally enhancing collection concerning for abscess formation measuring up to 4 5 x 2 4 x 3 6 cm best seen on series 602 image 57 and series 3 image 53  Extensive skin thickening is noted throughout the lower extremity, especially anteriorly  No additional suspected drainable collections identified  Baker's cyst noted  OTHER PERTINENT FINDINGS:  None  Impression: Extensive right lower extremity subcutaneous edema consistent with nonspecific cellulitis including a focal anteromedial subcutaneous 4 5 x 2 4 x 3 6 cm collection concerning for abscess formation  No underlying osseous destructive change to indicate osteomyelitis at this time  The study was marked in [facility] for immediate notification  Workstation performed: [id number]     Vas Lower Limb Venous Duplex Study, Complete Bilateral    Result Date: 12/30/2019  Narrative:  [facility] REPORT CLINICAL: Indications: Patient presents with chronic bilateral lower extremity edema  Operative History: Denies cardiovascular intervention Risk Factors The patient has history of Obesity  The patient current BMI is 75 45, Weight is 426 lb and height is 63 in  FINDINGS:  Segment  Right            Left              Impression       Impression       CFV      Normal (Patent)  Normal (Patent)     CONCLUSION: Impression: RIGHT LOWER LIMB: No evidence of acute or chronic deep vein thrombosis   No evidence of superficial thrombophlebitis noted  Doppler evaluation shows a normal response to augmentation maneuvers  Popliteal and posterior tibial arterial Doppler waveforms are triphasic  LEFT LOWER LIMB: No evidence of acute or chronic deep vein thrombosis  No evidence of superficial thrombophlebitis noted  Doppler evaluation shows a normal response to augmentation maneuvers  Popliteal and  posterior tibial arterial Doppler waveforms are biphasic  Technically difficult/limited study to body habitus, pitting edema and poor visualization of lower extremity veins  Technical findings were given to Dr Elizabeth Dumont  SIGNATURE: Electronically Signed by: Gloria Olivier on 2019-12-30 03:57:15 PM       Medication Adjustments and Discharge Medications:  · Summary of Medication Adjustments made as a result of this hospitalization: none  · Medication Dosing Tapers - Please refer to Discharge Medication List for details on any medication dosing tapers (if applicable to patient)  · Discharge Medication List: See after visit summary for reconciled discharge medications  Wound Care Recommendations:  When applicable, please see wound care section of After Visit Summary  Diet Recommendations at Discharge:  Diet -       Incidental Findings:   · none     Test Results Pending at Discharge (will require follow up):   · none         Hospital Course:     Justice Hendrickson is a 61 y o  female patient who originally presented to the hospital on 12/28/2019 due to cellulitis and ambulatory dysfubnction  Please see above on problem list for detail  Condition at Discharge: stable     Discharge Day Visit / Exam:     Subjective:  No compliants    Vitals: Blood Pressure: 116/71 (01/10/20 2256)  Pulse: 84 (01/10/20 2256)  Temperature: 98 4 °F (36 9 °C) (01/10/20 2256)  Temp Source: Oral (12/28/19 1610)  Respirations: 15 (01/10/20 2256)  Height: 5' 3" (160 cm) (12/30/19 1502)  Weight - Scale: (!) 194 kg (426 lb 9 6 oz) (12/28/19 1221)  SpO2: 96 % (01/10/20 8366)  Exam:     General appearance: alert  Head: Normocephalic, without obvious abnormality, atraumatic  Lungs: clear to auscultation bilaterally  Heart: regular rate and rhythm  Abdomen: soft, non-tender, positive bowel sounds   Back: negative  Extremities: Bilateral lower extremity edema and mild skin breakdown  Moderatly erythematous right lower extremitty more around the knee region posteriorly  Neurologic: Alert and oriented X 3, normal strength and tone  Normal symmetric reflexes  Normal coordination and gait      Discharge instructions/Information to patient and family:   See after visit summary section titled Discharge Instructions for information provided to patient and family  Planned Readmission: yes      Discharge Statement:  I spent 35 minutes discharging the patient  This time was spent on the day of discharge  I had direct contact with the patient on the day of discharge  Greater than 50% of the total time was spent examining patient, answering all patient questions, arranging and discussing plan of care with patient as well as directly providing post-discharge instructions  Additional time then spent on discharge activities      ** Please Note: This note has been constructed using a voice recognition system **

## 2020-01-17 NOTE — ASSESSMENT & PLAN NOTE
· The patient was evaluated by physical therapy and occupational therapy during the hospitalization  · The patient will be discharged to the [facility]

## 2020-01-17 NOTE — ASSESSMENT & PLAN NOTE
· Initially treated with IV vancomycin and IV zosyn  · Completed a 5-day course of IV zosyn  · Completed an 8-day course of IV vancomycin  · Medically stable for discharge to [facility]    MRSA culture [[id number]] (Normal) Collected: 12/31/19 1925   Lab Status: Final result Specimen: Nares from Nose Updated: 01/02/20 1241    MRSA Culture Only No Methicillin Resistant Staphlyococcus aureus (MRSA) isolated

## 2020-01-17 NOTE — ASSESSMENT & PLAN NOTE
· Probable undiagnosed obesity-hypoventilation syndrome and severe obstructive sleep apnea  · Initiated on Bipap QHS and anytime while sleeping for a goal oxygen saturation of 90% and above  · Utilize supplemental oxygen via the nasal cannula to maintain oxygen saturation levels at 90% and above  · Outpatient sleep study completed as soon as possible to check for obstructive sleep apnea  · The patient was seen in consultation by Pulmonology while inpatient  · Outpatient Pulmonology evaluation  · Outpatient Bariatric Surgery evaluation    Results for [facility], [place], operated by [facility] (MRN [id number]) as of 1/4/2020 13:47   Ref   Range 1/4/2020 10:09   pH, Arterial Latest Ref Range: 7 350 - 7 450  7 344 (L)   pCO2, Arterial Latest Ref Range: 36 0 - 44 0 mm Hg 70 3 (HH)   pO2, Arterial Latest Ref Range: 75 0 - 129 0 mm Hg 81 3   HCO3, Arterial Latest Ref Range: 22 0 - 28 0 mmol/L 37 4 (H)   Base Excess, Arterial Latest Units: mmol/L 9 6   O2 Content, Arterial Latest Ref Range: 16 0 - 23 0 mL/dL 14 7 (L)   O2 HGB,Arterial Latest Ref Range: 94 0 - 97 0 % 94 2   ABG SOURCE Unknown Radial, Left   NASAL CANNULA Unknown 3L

## 2020-01-31 DIAGNOSIS — G47.33 OSA (OBSTRUCTIVE SLEEP APNEA): Primary | ICD-10-CM

## 2020-02-21 ENCOUNTER — TELEPHONE (OUTPATIENT)
Dept: SLEEP CENTER | Facility: CLINIC | Age: 60
End: 2020-02-21

## 2020-04-13 ENCOUNTER — TELEPHONE (OUTPATIENT)
Dept: SLEEP CENTER | Facility: CLINIC | Age: 60
End: 2020-04-13

## 2020-04-24 ENCOUNTER — TELEPHONE (OUTPATIENT)
Dept: BARIATRICS | Facility: CLINIC | Age: 60
End: 2020-04-24

## 2020-06-01 ENCOUNTER — OFFICE VISIT (OUTPATIENT)
Dept: SLEEP CENTER | Facility: CLINIC | Age: 60
End: 2020-06-01
Payer: COMMERCIAL

## 2020-06-01 ENCOUNTER — TELEPHONE (OUTPATIENT)
Dept: SLEEP CENTER | Facility: CLINIC | Age: 60
End: 2020-06-01

## 2020-06-01 VITALS
OXYGEN SATURATION: 97 % | BODY MASS INDEX: 53.92 KG/M2 | HEIGHT: 62 IN | DIASTOLIC BLOOD PRESSURE: 76 MMHG | TEMPERATURE: 97.9 F | SYSTOLIC BLOOD PRESSURE: 134 MMHG | HEART RATE: 73 BPM | WEIGHT: 293 LBS

## 2020-06-01 DIAGNOSIS — Z99.89 OSA ON CPAP: Primary | ICD-10-CM

## 2020-06-01 DIAGNOSIS — J96.02 ACUTE RESPIRATORY FAILURE WITH HYPOXIA AND HYPERCAPNIA (HCC): ICD-10-CM

## 2020-06-01 DIAGNOSIS — J96.01 ACUTE RESPIRATORY FAILURE WITH HYPOXIA AND HYPERCAPNIA (HCC): ICD-10-CM

## 2020-06-01 DIAGNOSIS — G47.33 OSA ON CPAP: Primary | ICD-10-CM

## 2020-06-01 DIAGNOSIS — E66.01 MORBID OBESITY WITH BMI OF 70 AND OVER, ADULT (HCC): ICD-10-CM

## 2020-06-01 PROCEDURE — 99244 OFF/OP CNSLTJ NEW/EST MOD 40: CPT | Performed by: PSYCHIATRY & NEUROLOGY

## 2020-06-01 RX ORDER — FLUOXETINE HYDROCHLORIDE 20 MG/1
20 CAPSULE ORAL DAILY
COMMUNITY
Start: 2020-05-30

## 2020-06-01 RX ORDER — BUPROPION HYDROCHLORIDE 300 MG/1
300 TABLET ORAL EVERY MORNING
COMMUNITY
Start: 2020-05-19

## 2020-06-01 RX ORDER — SPIRONOLACTONE 50 MG/1
50 TABLET, FILM COATED ORAL DAILY
COMMUNITY
Start: 2020-05-30

## 2020-06-01 RX ORDER — HYDROCODONE BITARTRATE AND ACETAMINOPHEN 5; 325 MG/1; MG/1
1 TABLET ORAL EVERY 6 HOURS PRN
COMMUNITY

## 2020-06-01 RX ORDER — LEVOTHYROXINE SODIUM 0.12 MG/1
125 TABLET ORAL DAILY
COMMUNITY
End: 2020-06-01

## 2020-08-14 NOTE — RESPIRATORY THERAPY NOTE
RT Protocol Note  Lord Matos 61 y o  female MRN: [id number]  Unit/Bed#: -01 Encounter: [id number]    Assessment    Principal Problem:    Cellulitis of right lower extremity  Active Problems:    Lymphedema    Acute metabolic encephalopathy    Transaminitis    Morbid obesity with BMI of 70 and over, adult ([facility] [address] )    Skin breakdown    Elevated troponin level    Left foot pain    Acquired hypothyroidism      Home Pulmonary Medications:  none       Past Medical History:   Diagnosis Date    Disease of thyroid gland     Lymphedema     Morbid obesity due to excess calories ([facility] [address] )     Renal disorder      Social History     Socioeconomic History    Marital status: /Civil Union     Spouse name: None    Number of children: None    Years of education: None    Highest education level: None   Occupational History    None   Social Needs    Financial resource strain: None    Food insecurity:     Worry: None     Inability: None    Transportation needs:     Medical: None     Non-medical: None   Tobacco Use    Smoking status: Never Smoker    Smokeless tobacco: Never Used   Substance and Sexual Activity    Alcohol use: Not Currently    Drug use: Never    Sexual activity: Not Currently   Lifestyle    Physical activity:     Days per week: None     Minutes per session: None    Stress: None   Relationships    Social connections:     Talks on phone: None     Gets together: None     Attends Restorationist service: None     Active member of club or organization: None     Attends meetings of clubs or organizations: None     Relationship status: None    Intimate partner violence:     Fear of current or ex partner: None     Emotionally abused: None     Physically abused: None     Forced sexual activity: None   Other Topics Concern    None   Social History Narrative    None       Subjective         Objective    Physical Exam:   Assessment Type: Assess only  General Appearance: Alert, Awake  Respiratory Pattern: 7.9  Thrombocytopenia. Improving. Plt 131. Started on DVT Prophylaxis SubQ Heparin on 8/12/2020    PLAN:    · SCDs on at all times  · PT/OT. Ambulate as able in hallway.   · Heparin SubQ for DVT prophylaxis     Discussed with: Dr. Galindo, patient, RN    ERNESTO Kumar  8/14/2020  9:54 AM     Normal  Chest Assessment: Chest expansion symmetrical  Bilateral Breath Sounds: Diminished  Cough: None  O2 Device: none    Vitals:  Blood pressure 124/57, pulse 76, temperature 98 4 °F (36 9 °C), resp  rate 16, height 5' 3" (1 6 m), weight (!) 194 kg (426 lb 9 6 oz), SpO2 97 %  Imaging and other studies: Mild cardiomegaly and borderline vascular congestion     No localized infiltrates identified  O2 Device: none     Plan    Respiratory Plan: No distress/Pulmonary history        Resp Comments: patient is morbidly obese, denies respiratory medication use at home    Denies Home O2, denies smoking

## 2020-09-01 ENCOUNTER — HOSPITAL ENCOUNTER (OUTPATIENT)
Dept: SLEEP CENTER | Facility: HOSPITAL | Age: 60
Discharge: HOME | End: 2020-09-01
Attending: PSYCHIATRY & NEUROLOGY
Payer: COMMERCIAL

## 2020-09-01 DIAGNOSIS — J96.01 ACUTE RESPIRATORY FAILURE WITH HYPOXIA AND HYPERCAPNIA (HCC): ICD-10-CM

## 2020-09-01 DIAGNOSIS — J96.02 ACUTE RESPIRATORY FAILURE WITH HYPOXIA AND HYPERCAPNIA (HCC): ICD-10-CM

## 2020-09-01 DIAGNOSIS — Z99.89 OSA ON CPAP: ICD-10-CM

## 2020-09-01 DIAGNOSIS — E66.01 MORBID OBESITY WITH BMI OF 70 AND OVER, ADULT (HCC): ICD-10-CM

## 2020-09-01 DIAGNOSIS — G47.33 OSA ON CPAP: ICD-10-CM

## 2020-09-01 PROCEDURE — 95810 POLYSOM 6/> YRS 4/> PARAM: CPT | Performed by: PSYCHIATRY & NEUROLOGY

## 2020-09-01 PROCEDURE — 95810 POLYSOM 6/> YRS 4/> PARAM: CPT

## 2020-09-02 NOTE — PROGRESS NOTES
Sleep Study Documentation    Pre-Sleep Study       Sleep testing procedure explained to patient:YES    Patient napped prior to study:NO    Caffeine:Dayshift worker after 12PM   Caffeine use:NO    Alcohol:Dayshift workers after 5PM: Alcohol use:NO    Typical day for patient:YES       Study Documentation    Sleep Study Indications: Snore, EDS, Witnessed Apnea, Obesity    Sleep Study: Diagnostic   Snore:Severe  Supplemental O2: no    O2 flow rate (L/min) range NA   O2 flow rate (L/min) final NA  Minimum SaO2 54  Baseline SaO2 94        Mode of Therapy: NA    EKG abnormalities: no     EEG abnormalities: no    Sleep Study Recorded < 2 hours: N/A    Sleep Study Recorded > 2 hours but incomplete study: N/A    Sleep Study Recorded 6 hours but no sleep obtained: NO    Patient classification: disabled       Post-Sleep Study    Medication used at bedtime or during sleep study:NO    Patient reports time it took to fall asleep:20 to 30 minutes    Patient reports waking up during study:1 to 2 times  Patient reports returning to sleep in 10 to 30 minutes  Patient reports sleeping 6 to 8 hours without dreaming  Patient reports sleep during study:typical    Patient rated sleepiness: Somewhat sleepy or tired    PAP treatment:no

## 2020-09-04 ENCOUNTER — TELEPHONE (OUTPATIENT)
Dept: SLEEP CENTER | Facility: CLINIC | Age: 60
End: 2020-09-04

## 2020-09-04 NOTE — TELEPHONE ENCOUNTER
----- Message from Kayli Pate MD sent at 9/3/2020 12:03 PM EDT -----  PSG read  She has JESSE, this study should be submitted to DME and insurance  She should continue to use her CPAP

## 2020-09-04 NOTE — TELEPHONE ENCOUNTER
I spoke with patient, advised above    Patient states she does not know who she received the machine from (DME provider), I advised she should call medical records at [facility] to see who they get their machine's from and to call us back so we can get them the information needed      Patient agreeable and will call back with DME provider

## 2020-09-04 NOTE — TELEPHONE ENCOUNTER
Patient called back  Her DME provider is Zhen  Will ask zhen if they need diagnostic results     Jennifer please advise

## 2020-09-08 NOTE — TELEPHONE ENCOUNTER
We do not have a diagnostic on file because Patient's ABG's and LYLY qualified her for her machine  Diagnoses of COPD  Not JESSE

## 2021-03-07 ENCOUNTER — HOSPITAL ENCOUNTER (INPATIENT)
Facility: HOSPITAL | Age: 61
LOS: 5 days | Discharge: HOME HEALTH | DRG: 872 | End: 2021-03-12
Attending: EMERGENCY MEDICINE | Admitting: FAMILY MEDICINE
Payer: MEDICARE

## 2021-03-07 DIAGNOSIS — A41.9 SEPSIS (HCC): ICD-10-CM

## 2021-03-07 DIAGNOSIS — M79.605 BILATERAL LEG PAIN: ICD-10-CM

## 2021-03-07 DIAGNOSIS — E66.01 MORBID OBESITY WITH BMI OF 70 AND OVER, ADULT (HCC): ICD-10-CM

## 2021-03-07 DIAGNOSIS — R78.81 BACTEREMIA: ICD-10-CM

## 2021-03-07 DIAGNOSIS — L03.116 CELLULITIS OF LEFT LOWER EXTREMITY: ICD-10-CM

## 2021-03-07 DIAGNOSIS — L03.116 LEFT LEG CELLULITIS: Primary | ICD-10-CM

## 2021-03-07 DIAGNOSIS — I73.9 PVD (PERIPHERAL VASCULAR DISEASE) (HCC): ICD-10-CM

## 2021-03-07 DIAGNOSIS — M79.604 BILATERAL LEG PAIN: ICD-10-CM

## 2021-03-07 LAB
ALBUMIN SERPL BCP-MCNC: 2.8 G/DL (ref 3.5–5)
ALP SERPL-CCNC: 90 U/L (ref 46–116)
ALT SERPL W P-5'-P-CCNC: 22 U/L (ref 12–78)
ANION GAP SERPL CALCULATED.3IONS-SCNC: 9 MMOL/L (ref 4–13)
APTT PPP: 43 SECONDS (ref 23–37)
AST SERPL W P-5'-P-CCNC: 22 U/L (ref 5–45)
BASOPHILS # BLD MANUAL: 0.32 THOUSAND/UL (ref 0–0.1)
BASOPHILS NFR MAR MANUAL: 1 % (ref 0–1)
BILIRUB SERPL-MCNC: 0.84 MG/DL (ref 0.2–1)
BUN SERPL-MCNC: 17 MG/DL (ref 5–25)
CALCIUM ALBUM COR SERPL-MCNC: 9.3 MG/DL (ref 8.3–10.1)
CALCIUM SERPL-MCNC: 8.3 MG/DL (ref 8.3–10.1)
CHLORIDE SERPL-SCNC: 100 MMOL/L (ref 100–108)
CO2 SERPL-SCNC: 26 MMOL/L (ref 21–32)
CREAT SERPL-MCNC: 1.11 MG/DL (ref 0.6–1.3)
EOSINOPHIL # BLD MANUAL: 0 THOUSAND/UL (ref 0–0.4)
EOSINOPHIL NFR BLD MANUAL: 0 % (ref 0–6)
ERYTHROCYTE [DISTWIDTH] IN BLOOD BY AUTOMATED COUNT: 12.8 % (ref 11.6–15.1)
GFR SERPL CREATININE-BSD FRML MDRD: 54 ML/MIN/1.73SQ M
GLUCOSE SERPL-MCNC: 123 MG/DL (ref 65–140)
HCT VFR BLD AUTO: 36.7 % (ref 34.8–46.1)
HGB BLD-MCNC: 11.8 G/DL (ref 11.5–15.4)
INR PPP: 1.23 (ref 0.84–1.19)
LACTATE SERPL-SCNC: 1.6 MMOL/L (ref 0.5–2)
LIPASE SERPL-CCNC: 34 U/L (ref 73–393)
LYMPHOCYTES # BLD AUTO: 0.32 THOUSAND/UL (ref 0.6–4.47)
LYMPHOCYTES # BLD AUTO: 1 % (ref 14–44)
MCH RBC QN AUTO: 27.8 PG (ref 26.8–34.3)
MCHC RBC AUTO-ENTMCNC: 32.2 G/DL (ref 31.4–37.4)
MCV RBC AUTO: 87 FL (ref 82–98)
METAMYELOCYTES NFR BLD MANUAL: 1 % (ref 0–1)
MONOCYTES # BLD AUTO: 1.62 THOUSAND/UL (ref 0–1.22)
MONOCYTES NFR BLD: 5 % (ref 4–12)
NEUTROPHILS # BLD MANUAL: 29.76 THOUSAND/UL (ref 1.85–7.62)
NEUTS BAND NFR BLD MANUAL: 8 % (ref 0–8)
NEUTS SEG NFR BLD AUTO: 84 % (ref 43–75)
NRBC BLD AUTO-RTO: 0 /100 WBCS
PLATELET # BLD AUTO: 269 THOUSANDS/UL (ref 149–390)
PLATELET BLD QL SMEAR: ADEQUATE
PMV BLD AUTO: 10.1 FL (ref 8.9–12.7)
POTASSIUM SERPL-SCNC: 4 MMOL/L (ref 3.5–5.3)
PROT SERPL-MCNC: 7.6 G/DL (ref 6.4–8.2)
PROTHROMBIN TIME: 15.2 SECONDS (ref 11.6–14.5)
RBC # BLD AUTO: 4.24 MILLION/UL (ref 3.81–5.12)
RBC MORPH BLD: NORMAL
SODIUM SERPL-SCNC: 135 MMOL/L (ref 136–145)
TOTAL CELLS COUNTED SPEC: 100
TROPONIN I SERPL-MCNC: <0.02 NG/ML
WBC # BLD AUTO: 32.35 THOUSAND/UL (ref 4.31–10.16)
WBC TOXIC VACUOLES BLD QL SMEAR: PRESENT

## 2021-03-07 PROCEDURE — 83605 ASSAY OF LACTIC ACID: CPT | Performed by: EMERGENCY MEDICINE

## 2021-03-07 PROCEDURE — 96365 THER/PROPH/DIAG IV INF INIT: CPT

## 2021-03-07 PROCEDURE — 85007 BL SMEAR W/DIFF WBC COUNT: CPT | Performed by: EMERGENCY MEDICINE

## 2021-03-07 PROCEDURE — 99223 1ST HOSP IP/OBS HIGH 75: CPT | Performed by: FAMILY MEDICINE

## 2021-03-07 PROCEDURE — 93005 ELECTROCARDIOGRAM TRACING: CPT

## 2021-03-07 PROCEDURE — 94760 N-INVAS EAR/PLS OXIMETRY 1: CPT

## 2021-03-07 PROCEDURE — 87040 BLOOD CULTURE FOR BACTERIA: CPT | Performed by: EMERGENCY MEDICINE

## 2021-03-07 PROCEDURE — 94660 CPAP INITIATION&MGMT: CPT

## 2021-03-07 PROCEDURE — 99284 EMERGENCY DEPT VISIT MOD MDM: CPT

## 2021-03-07 PROCEDURE — 99285 EMERGENCY DEPT VISIT HI MDM: CPT | Performed by: EMERGENCY MEDICINE

## 2021-03-07 PROCEDURE — 87081 CULTURE SCREEN ONLY: CPT | Performed by: FAMILY MEDICINE

## 2021-03-07 PROCEDURE — 83690 ASSAY OF LIPASE: CPT | Performed by: EMERGENCY MEDICINE

## 2021-03-07 PROCEDURE — 85027 COMPLETE CBC AUTOMATED: CPT | Performed by: EMERGENCY MEDICINE

## 2021-03-07 PROCEDURE — 84484 ASSAY OF TROPONIN QUANT: CPT | Performed by: EMERGENCY MEDICINE

## 2021-03-07 PROCEDURE — 36415 COLL VENOUS BLD VENIPUNCTURE: CPT | Performed by: EMERGENCY MEDICINE

## 2021-03-07 PROCEDURE — 80053 COMPREHEN METABOLIC PANEL: CPT | Performed by: EMERGENCY MEDICINE

## 2021-03-07 PROCEDURE — 85730 THROMBOPLASTIN TIME PARTIAL: CPT | Performed by: EMERGENCY MEDICINE

## 2021-03-07 PROCEDURE — 85610 PROTHROMBIN TIME: CPT | Performed by: EMERGENCY MEDICINE

## 2021-03-07 RX ORDER — MELATONIN
2000 DAILY
Status: DISCONTINUED | OUTPATIENT
Start: 2021-03-08 | End: 2021-03-12 | Stop reason: HOSPADM

## 2021-03-07 RX ORDER — SODIUM CHLORIDE 9 MG/ML
125 INJECTION, SOLUTION INTRAVENOUS ONCE
Status: COMPLETED | OUTPATIENT
Start: 2021-03-07 | End: 2021-03-07

## 2021-03-07 RX ORDER — SPIRONOLACTONE 25 MG/1
50 TABLET ORAL DAILY
Status: DISCONTINUED | OUTPATIENT
Start: 2021-03-08 | End: 2021-03-12 | Stop reason: HOSPADM

## 2021-03-07 RX ORDER — FOLIC ACID 1 MG/1
1 TABLET ORAL DAILY
Status: DISCONTINUED | OUTPATIENT
Start: 2021-03-08 | End: 2021-03-12 | Stop reason: HOSPADM

## 2021-03-07 RX ORDER — HYDROCODONE BITARTRATE AND ACETAMINOPHEN 5; 325 MG/1; MG/1
1 TABLET ORAL EVERY 6 HOURS PRN
Status: DISCONTINUED | OUTPATIENT
Start: 2021-03-07 | End: 2021-03-12 | Stop reason: HOSPADM

## 2021-03-07 RX ORDER — LEVOTHYROXINE SODIUM 0.12 MG/1
125 TABLET ORAL
Status: DISCONTINUED | OUTPATIENT
Start: 2021-03-08 | End: 2021-03-12 | Stop reason: HOSPADM

## 2021-03-07 RX ORDER — FLUOXETINE HYDROCHLORIDE 20 MG/1
20 CAPSULE ORAL DAILY
Status: DISCONTINUED | OUTPATIENT
Start: 2021-03-08 | End: 2021-03-12 | Stop reason: HOSPADM

## 2021-03-07 RX ORDER — BUPROPION HYDROCHLORIDE 150 MG/1
300 TABLET ORAL DAILY
Status: DISCONTINUED | OUTPATIENT
Start: 2021-03-08 | End: 2021-03-12 | Stop reason: HOSPADM

## 2021-03-07 RX ADMIN — VANCOMYCIN HYDROCHLORIDE 1750 MG: 1 INJECTION, POWDER, LYOPHILIZED, FOR SOLUTION INTRAVENOUS at 18:42

## 2021-03-07 RX ADMIN — SODIUM CHLORIDE 125 ML/HR: 0.9 INJECTION, SOLUTION INTRAVENOUS at 21:44

## 2021-03-07 RX ADMIN — HYDROCODONE BITARTRATE AND ACETAMINOPHEN 1 TABLET: 5; 325 TABLET ORAL at 21:43

## 2021-03-07 RX ADMIN — PIPERACILLIN AND TAZOBACTAM 3.38 G: 36; 4.5 INJECTION, POWDER, FOR SOLUTION INTRAVENOUS at 23:33

## 2021-03-07 RX ADMIN — SODIUM CHLORIDE 2000 ML: 0.9 INJECTION, SOLUTION INTRAVENOUS at 17:25

## 2021-03-07 RX ADMIN — PIPERACILLIN AND TAZOBACTAM 3.38 G: 36; 4.5 INJECTION, POWDER, FOR SOLUTION INTRAVENOUS at 17:42

## 2021-03-07 NOTE — ASSESSMENT & PLAN NOTE
Upon arrival, patient was tachycardic, WBC is 32+, known source of infection-cellulitis  Lactic acid normal  Follow-up blood culture  Patient started on IV antibiotic with vancomycin and Zosyn-continue  Follow wound care consultation  Continue to monitor

## 2021-03-07 NOTE — H&P
H&P- Mohit Pascal 1960, 64 y o  female MRN: [id number]    Unit/Bed#: ED 11 Encounter: [id number]    Primary Care Provider: Chaim Arreaga DO   Date and time admitted to hospital: 3/7/2021  4:43 PM        Cellulitis of right lower extremity  Assessment & Plan  Continue IV antibiotic with vancomycin and Zosyn  Follow pharmacy consult  Just vancomycin  Follow wound care consultation    * Sepsis [facility])  Assessment & Plan  Upon arrival, patient was tachycardic, WBC is 32+, known source of infection-cellulitis  Lactic acid normal  Follow-up blood culture  Patient started on IV antibiotic with vancomycin and Zosyn-continue  Follow wound care consultation  Continue to monitor    JESSE (obstructive sleep apnea)  Assessment & Plan  Patient does use CPAP at home  Continue CPAP  Follow respiratory protocol    Acquired hypothyroidism  Assessment & Plan  Continue thyroid medication    Morbid obesity with BMI of 70 and over, adult [facility])  Assessment & Plan  Low-fat, low-salt diet  Patient already evaluated by Sleep Medicine, and diagnosed with severe sleep apnea, requiring CPAP  As per chart review, patient was referred to Bariatric surgery prior    Lymphedema  Assessment & Plan  Chronic in nature      VTE Prophylaxis: Enoxaparin (Lovenox)  / sequential compression device   Code Status:  Full code    Anticipated Length of Stay:  Patient will be admitted on an Inpatient basis with an anticipated length of stay of  > 2 midnights  Justification for Hospital Stay:  To monitor above condition    Total Time for Visit, including Counseling / Coordination of Care: 45 minutes  Greater than 50% of this total time spent on direct patient counseling and coordination of care  Chief Complaint:   Redness of lower extremity    History of Present Illness:    Mohit Pascal is a 64 y o  female who presents with redness of lower extremity    Patient also reports she is not eating well for past 1 days, and was having fever and chills at home   Denies any nausea vomiting, diarrhea  But associated with nausea  Patient reports all the symptoms started for last 1 day and is getting worse  Denies any trauma in the lower extremities  Review of Systems:    Review of Systems   Constitutional: Positive for activity change, appetite change, chills and fever  Negative for diaphoresis, fatigue and unexpected weight change  HENT: Negative for congestion, dental problem and sore throat  Respiratory: Negative for apnea, cough, chest tightness, shortness of breath and wheezing  Cardiovascular: Positive for leg swelling  Negative for chest pain and palpitations  Gastrointestinal: Negative for abdominal distention, abdominal pain and diarrhea  Genitourinary: Negative for difficulty urinating and dyspareunia  Musculoskeletal: Negative for arthralgias, joint swelling and neck stiffness  Skin: Positive for color change, rash and wound  Negative for pallor  Neurological: Negative for dizziness, tremors, facial asymmetry and weakness  Psychiatric/Behavioral: Negative for agitation and behavioral problems  All other systems reviewed and are negative  Past Medical and Surgical History:     Past Medical History:   Diagnosis Date    Disease of thyroid gland     Lymphedema     Morbid obesity due to excess calories ([facility] [address] )     Renal disorder        History reviewed  No pertinent surgical history  Meds/Allergies:    Prior to Admission medications    Medication Sig Start Date End Date Taking?  Authorizing Provider   buPROPion (WELLBUTRIN XL) 300 mg 24 hr tablet  5/19/20  Yes Historical Provider, MD   cholecalciferol (VITAMIN D3) 1,000 units tablet Take 2,000 Units by mouth daily   Yes Historical Provider, MD   FLUoxetine (PROzac) 20 mg capsule  5/30/20  Yes Historical Provider, MD   folic acid (FOLVITE) 1 mg tablet Take 1 tablet (1 mg total) by mouth daily 1/10/20  Yes [employer], DO   HYDROcodone-acetaminophen (NORCO) 5-325 mg per tablet Take 1 tablet by mouth every 6 (six) hours as needed for pain   Yes Historical Provider, MD   levothyroxine 125 mcg tablet Take 1 tablet (125 mcg total) by mouth daily in the early morning 1/10/20  Yes Golden White DO   spironolactone (ALDACTONE) 50 mg tablet  5/30/20  Yes Historical Provider, MD   acetaminophen (TYLENOL) 325 mg tablet Take 2 tablets (650 mg total) by mouth every 6 (six) hours as needed for mild pain, headaches or fever  Patient not taking: Reported on 3/7/2021 1/9/20   Golden White DO   albuterol (2 5 mg/3 mL) 0 083 % nebulizer solution Take 1 vial (2 5 mg total) by nebulization every 4 (four) hours as needed for wheezing or shortness of breath (hypoxia)  Patient not taking: Reported on 3/7/2021 1/9/20   Golden White DO     I have reviewed home medications with patient personally  Allergies: No Known Allergies    Social History:     Marital Status: /Civil Union   Occupation:  Unknown  Patient Pre-hospital Living Situation:  At home  Patient Pre-hospital Level of Mobility:  With assistance  Patient Pre-hospital Diet Restrictions:  Cardiac diet  Substance Use History:   Social History     Substance and Sexual Activity   Alcohol Use Not Currently     Social History     Tobacco Use   Smoking Status Never Smoker   Smokeless Tobacco Never Used     Social History     Substance and Sexual Activity   Drug Use Never       Family History:    Family History   Problem Relation Age of Onset    Diabetes Mother     Diabetes Father        Physical Exam:     Vitals:   Blood Pressure: 166/64 (03/07/21 1655)  Pulse: (!) 112 (03/07/21 1655)  Temperature: 98 8 °F (37 1 °C) (03/07/21 1655)  Respirations: 20 (03/07/21 1655)  Height: 5' 3" (160 cm) (03/07/21 1655)  Weight - Scale: (!) 183 kg (402 lb 9 oz) (03/07/21 1655)  SpO2: 95 % (03/07/21 1655)    Physical Exam  Vitals signs and nursing note reviewed  Constitutional:       Appearance: She is obese     HENT:      Nose: No congestion  Mouth/Throat:      Mouth: Mucous membranes are moist       Pharynx: No oropharyngeal exudate or posterior oropharyngeal erythema  Eyes:      General: No scleral icterus  Conjunctiva/sclera: Conjunctivae normal       Pupils: Pupils are equal, round, and reactive to light  Neck:      Musculoskeletal: Normal range of motion  Cardiovascular:      Rate and Rhythm: Tachycardia present  Heart sounds: No friction rub  No gallop  Pulmonary:      Effort: Pulmonary effort is normal  No respiratory distress  Breath sounds: No stridor  No wheezing  Abdominal:      General: Abdomen is flat  Bowel sounds are normal  There is no distension  Tenderness: There is no abdominal tenderness  Musculoskeletal:         General: Swelling and tenderness present  Right lower leg: Edema ( lymphedema) present  Left lower leg: Edema ( lymphedema) present  Lymphadenopathy:      Cervical: No cervical adenopathy  Skin:     General: Skin is warm  Findings: Erythema present  Neurological:      General: No focal deficit present  Mental Status: She is alert and oriented to person, place, and time  Cranial Nerves: No cranial nerve deficit  Sensory: No sensory deficit  Motor: No weakness  Coordination: Coordination normal    Psychiatric:         Mood and Affect: Mood normal        Additional Data:     Lab Results: I have personally reviewed pertinent reports        Results from last 7 days   Lab Units 03/07/21  1721   WBC Thousand/uL 32 35*   HEMOGLOBIN g/dL 11 8   HEMATOCRIT % 36 7   PLATELETS Thousands/uL 269   BANDS PCT % 8   LYMPHO PCT % 1*   MONO PCT % 5   EOS PCT % 0     Results from last 7 days   Lab Units 03/07/21  1721   SODIUM mmol/L 135*   POTASSIUM mmol/L 4 0   CHLORIDE mmol/L 100   CO2 mmol/L 26   BUN mg/dL 17   CREATININE mg/dL 1 11   ANION GAP mmol/L 9   CALCIUM mg/dL 8 3   ALBUMIN g/dL 2 8*   TOTAL BILIRUBIN mg/dL 0 84   ALK PHOS U/L 90   ALT U/L 22   AST U/L 22   GLUCOSE RANDOM mg/dL 123     Results from last 7 days   Lab Units 03/07/21  1721   INR  1 23*             Results from last 7 days   Lab Units 03/07/21  1721   LACTIC ACID mmol/L 1 6       Imaging: I have personally reviewed pertinent reports  No orders to display       EKG, Pathology, and Other Studies Reviewed on Admission:   · EKG: reviewed    Allscripts / Epic Records Reviewed: Yes     ** Please Note: This note has been constructed using a voice recognition system   **

## 2021-03-07 NOTE — ED PROVIDER NOTES
History  Chief Complaint   Patient presents with    Leg Pain     left leg pain that started last night, son changes her leg dressings  last times she stated she had cellulitis      Patient is morbidly obese  States she has not been feeling well for the past 1 day  Decreased appetite  Having subjective fevers and chills  Nothing taken prior to arrival   Patient was admitted here in December for right leg cellulitis  States her left leg is now hurting  Pain started about 1 day ago  Has increasing redness and pain to the leg  Has dressing changes done by her son every other day  History provided by:  Patient   used: No    Leg Pain  Lower extremity pain location: Left leg  Time since incident:  2 days  Injury: no    Pain details:     Quality:  Aching    Radiates to:  Does not radiate    Onset quality:  Gradual    Duration:  2 days    Timing:  Constant    Progression:  Worsening  Chronicity:  New  Dislocation: no    Prior injury to area:  No  Relieved by:  Nothing  Worsened by:  Nothing  Ineffective treatments:  None tried  Associated symptoms: fever and swelling    Associated symptoms: no back pain, no neck pain, no numbness and no tingling    Risk factors: obesity        Prior to Admission Medications   Prescriptions Last Dose Informant Patient Reported? Taking?    FLUoxetine (PROzac) 20 mg capsule   Yes Yes   HYDROcodone-acetaminophen (NORCO) 5-325 mg per tablet   Yes Yes   Sig: Take 1 tablet by mouth every 6 (six) hours as needed for pain   acetaminophen (TYLENOL) 325 mg tablet Not Taking at Unknown time  No No   Sig: Take 2 tablets (650 mg total) by mouth every 6 (six) hours as needed for mild pain, headaches or fever   Patient not taking: Reported on 3/7/2021   albuterol (2 5 mg/3 mL) 0 083 % nebulizer solution Not Taking at Unknown time  No No   Sig: Take 1 vial (2 5 mg total) by nebulization every 4 (four) hours as needed for wheezing or shortness of breath (hypoxia)   Patient not taking: Reported on 3/7/2021   buPROPion (WELLBUTRIN XL) 300 mg 24 hr tablet   Yes Yes   cholecalciferol (VITAMIN D3) 1,000 units tablet   Yes Yes   Sig: Take 2,000 Units by mouth daily   folic acid (FOLVITE) 1 mg tablet   No Yes   Sig: Take 1 tablet (1 mg total) by mouth daily   levothyroxine 125 mcg tablet   No Yes   Sig: Take 1 tablet (125 mcg total) by mouth daily in the early morning   spironolactone (ALDACTONE) 50 mg tablet   Yes Yes      Facility-Administered Medications: None       Past Medical History:   Diagnosis Date    Disease of thyroid gland     Lymphedema     Morbid obesity due to excess calories (HCC)     Renal disorder        History reviewed  No pertinent surgical history  Family History   Problem Relation Age of Onset    Diabetes Mother     Diabetes Father      I have reviewed and agree with the history as documented  E-Cigarette/Vaping    E-Cigarette Use Never User      E-Cigarette/Vaping Substances     Social History     Tobacco Use    Smoking status: Never Smoker    Smokeless tobacco: Never Used   Substance Use Topics    Alcohol use: Not Currently    Drug use: Never       Review of Systems   Constitutional: Positive for appetite change, chills and fever  HENT: Negative for ear pain, hearing loss, sore throat, trouble swallowing and voice change  Eyes: Negative for pain and discharge  Respiratory: Negative for cough, shortness of breath and wheezing  Cardiovascular: Negative for chest pain and palpitations  Gastrointestinal: Negative for abdominal pain, blood in stool, constipation, diarrhea, nausea and vomiting  Genitourinary: Negative for dysuria, flank pain, frequency and hematuria  Musculoskeletal: Positive for myalgias  Negative for back pain, joint swelling, neck pain and neck stiffness  Skin: Negative for rash and wound  Neurological: Positive for dizziness and weakness  Negative for seizures, syncope, facial asymmetry and headaches  Psychiatric/Behavioral: Negative for hallucinations, self-injury and suicidal ideas  All other systems reviewed and are negative  Physical Exam  Physical Exam  Vitals signs and nursing note reviewed  Constitutional:       General: She is not in acute distress  Appearance: She is well-developed  HENT:      Head: Normocephalic and atraumatic  Right Ear: External ear normal       Left Ear: External ear normal    Eyes:      Conjunctiva/sclera: Conjunctivae normal       Pupils: Pupils are equal, round, and reactive to light  Neck:      Musculoskeletal: Normal range of motion and neck supple  Cardiovascular:      Rate and Rhythm: Normal rate and regular rhythm  Heart sounds: Normal heart sounds  No murmur  Pulmonary:      Effort: Pulmonary effort is normal       Breath sounds: Normal breath sounds  No wheezing or rales  Abdominal:      General: Bowel sounds are normal  There is no distension  Palpations: Abdomen is soft  Tenderness: There is no abdominal tenderness  There is no guarding or rebound  Musculoskeletal: Normal range of motion  General: No deformity  Comments: Chronic sores on both lower extremities  However there is increased erythema and warmth to the left lower leg compared to the right  Extends up to the thigh  Skin:     General: Skin is warm and dry  Findings: No rash  Neurological:      General: No focal deficit present  Mental Status: She is alert and oriented to person, place, and time  Cranial Nerves: No cranial nerve deficit     Psychiatric:         Behavior: Behavior normal          Vital Signs  ED Triage Vitals [03/07/21 1655]   Temperature Pulse Respirations Blood Pressure SpO2   98 8 °F (37 1 °C) (!) 112 20 166/64 95 %      Temp src Heart Rate Source Patient Position - Orthostatic VS BP Location FiO2 (%)   -- Monitor Lying Left arm --      Pain Score       6           Vitals:    03/07/21 1655   BP: 166/64   Pulse: (!) 112   Patient Position - Orthostatic VS: Lying         Visual Acuity      ED Medications  Medications   sodium chloride 0 9 % bolus 2,000 mL (2,000 mL Intravenous New Bag 3/7/21 1725)   vancomycin (VANCOCIN) 2,750 mg in sodium chloride 0 9 % 500 mL IVPB (has no administration in time range)   piperacillin-tazobactam (ZOSYN) 3 375 g in sodium chloride 0 9 % 100 mL IVPB (3 375 g Intravenous New Bag 3/7/21 1742)       Diagnostic Studies  Results Reviewed     Procedure Component Value Units Date/Time    CBC and differential [[id number]]  (Abnormal) Collected: 03/07/21 1721    Lab Status: Final result Specimen: Blood from Arm, Left Updated: 03/07/21 1752     WBC 32 35 Thousand/uL      RBC 4 24 Million/uL      Hemoglobin 11 8 g/dL      Hematocrit 36 7 %      MCV 87 fL      MCH 27 8 pg      MCHC 32 2 g/dL      RDW 12 8 %      MPV 10 1 fL      Platelets 857 Thousands/uL      nRBC 0 /100 WBCs     Manual Differential(PHLEBS Do Not Order) [[id number]]  (Abnormal) Collected: 03/07/21 1721    Lab Status: Final result Specimen: Blood from Arm, Left Updated: 03/07/21 1752     Segmented % 84 %      Bands % 8 %      Lymphocytes % 1 %      Monocytes % 5 %      Eosinophils, % 0 %      Basophils % 1 %      Metamyelocytes% 1 %      Absolute Neutrophils 29 76 Thousand/uL      Lymphocytes Absolute 0 32 Thousand/uL      Monocytes Absolute 1 62 Thousand/uL      Eosinophils Absolute 0 00 Thousand/uL      Basophils Absolute 0 32 Thousand/uL      Total Counted 100     Toxic Vacuolated Neutrophils Present     RBC Morphology Normal     Platelet Estimate Adequate    Lactic acid [[id number]]  (Normal) Collected: 03/07/21 1721    Lab Status: Final result Specimen: Blood from Arm, Left Updated: 03/07/21 1752     LACTIC ACID 1 6 mmol/L     Narrative:      Result may be elevated if tourniquet was used during collection      Troponin I [[id number]]  (Normal) Collected: 03/07/21 1721    Lab Status: Final result Specimen: Blood from Arm, Left Updated: 03/07/21 1750     Troponin I <0 02 ng/mL     Comprehensive metabolic panel [[id number]]  (Abnormal) Collected: 03/07/21 1721    Lab Status: Final result Specimen: Blood from Arm, Left Updated: 03/07/21 1748     Sodium 135 mmol/L      Potassium 4 0 mmol/L      Chloride 100 mmol/L      CO2 26 mmol/L      ANION GAP 9 mmol/L      BUN 17 mg/dL      Creatinine 1 11 mg/dL      Glucose 123 mg/dL      Calcium 8 3 mg/dL      Corrected Calcium 9 3 mg/dL      AST 22 U/L      ALT 22 U/L      Alkaline Phosphatase 90 U/L      Total Protein 7 6 g/dL      Albumin 2 8 g/dL      Total Bilirubin 0 84 mg/dL      eGFR 54 ml/min/1 73sq m     Narrative:      [place] guidelines for Chronic Kidney Disease (CKD):     Stage 1 with normal or high GFR (GFR > 90 mL/min/1 73 square meters)    Stage 2 Mild CKD (GFR = 60-89 mL/min/1 73 square meters)    Stage 3A Moderate CKD (GFR = 45-59 mL/min/1 73 square meters)    Stage 3B Moderate CKD (GFR = 30-44 mL/min/1 73 square meters)    Stage 4 Severe CKD (GFR = 15-29 mL/min/1 73 square meters)    Stage 5 End Stage CKD (GFR <15 mL/min/1 73 square meters)  Note: GFR calculation is accurate only with a steady state creatinine    Lipase [[id number]]  (Abnormal) Collected: 03/07/21 1721    Lab Status: Final result Specimen: Blood from Arm, Left Updated: 03/07/21 1748     Lipase 34 u/L     Protime-INR [[id number]]  (Abnormal) Collected: 03/07/21 1721    Lab Status: Final result Specimen: Blood from Arm, Left Updated: 03/07/21 1745     Protime 15 2 seconds      INR 1 23    APTT [[id number]]  (Abnormal) Collected: 03/07/21 1721    Lab Status: Final result Specimen: Blood from Arm, Left Updated: 03/07/21 1745     PTT 43 seconds     Blood culture #2 [[id number]] Collected: 03/07/21 1721    Lab Status: In process Specimen: Blood from Arm, Left Updated: 03/07/21 1726    Blood culture #1 [[id number]] Collected: 03/07/21 1722    Lab Status:  In process Specimen: Blood from Arm, Right Updated: 03/07/21 1725                 No orders to display              Procedures  ECG 12 Lead Documentation Only    Date/Time: 3/7/2021 5:14 PM  Performed by: Ruchi Swan MD  Authorized by: Ruchi Swan MD     ECG reviewed by me, the ED Provider: yes    Patient location:  ED  Previous ECG:     Previous ECG:  Compared to current    Similarity:  No change  Rate:     ECG rate:  100  Rhythm:     Rhythm: sinus rhythm    Ectopy:     Ectopy: none    QRS:     QRS axis:  Normal             ED Course  ED Course as of Mar 07 1804   Lily De Leon Mar 07, 2021   1656 Patient is morbidly obese  Will use the ideal body weight for the IV fluid bolus  SBIRT 20yo+      Most Recent Value   SBIRT (22 yo +)   In order to provide better care to our patients, we are screening all of our patients for alcohol and drug use  Would it be okay to ask you these screening questions? Yes Filed at: 03/07/2021 1757   Initial Alcohol Screen: US AUDIT-C    1  How often do you have a drink containing alcohol?  0 Filed at: 03/07/2021 1757   2  How many drinks containing alcohol do you have on a typical day you are drinking? 0 Filed at: 03/07/2021 1757   3a  Male UNDER 65: How often do you have five or more drinks on one occasion? 0 Filed at: 03/07/2021 1757   3b  FEMALE Any Age, or MALE 65+: How often do you have 4 or more drinks on one occassion? 0 Filed at: 03/07/2021 1757   Audit-C Score  0 Filed at: 03/07/2021 1757   CHANDNI: How many times in the past year have you    Used an illegal drug or used a prescription medication for non-medical reasons?   Never Filed at: 03/07/2021 1757                    MDM  Number of Diagnoses or Management Options  Left leg cellulitis:               Disposition  Final diagnoses:   Left leg cellulitis   Sepsis ([address] )     Time reflects when diagnosis was documented in both MDM as applicable and the Disposition within this note     Time User Action Codes Description Comment    3/7/2021  4:59 PM Marcelo Sharp Add [M23 781] Left leg cellulitis     3/7/2021  6:04 PM Misael Hollins Add [A41 9] Sepsis [facility])       ED Disposition     ED Disposition Condition Date/Time Comment    Admit Stable Sun Mar 7, 2021  6:03 PM Case was discussed with Dr Yanique Amado and the patient's admission status was agreed to be to the service of Dr Yanique Manzanares    None         Patient's Medications   Discharge Prescriptions    No medications on file     No discharge procedures on file      PDMP Review     None          ED Provider  Electronically Signed by           Hayley Leggett MD  03/07/21 6737

## 2021-03-07 NOTE — ASSESSMENT & PLAN NOTE
Continue IV antibiotic with vancomycin and Zosyn  Follow pharmacy consult    Just vancomycin  Follow wound care consultation

## 2021-03-07 NOTE — ASSESSMENT & PLAN NOTE
Low-fat, low-salt diet  Patient already evaluated by Sleep Medicine, and diagnosed with severe sleep apnea, requiring CPAP  As per chart review, patient was referred to Bariatric surgery prior

## 2021-03-08 ENCOUNTER — APPOINTMENT (INPATIENT)
Dept: NON INVASIVE DIAGNOSTICS | Facility: HOSPITAL | Age: 61
DRG: 872 | End: 2021-03-08
Payer: MEDICARE

## 2021-03-08 PROBLEM — I73.9 PAD (PERIPHERAL ARTERY DISEASE) (HCC): Status: ACTIVE | Noted: 2021-03-08

## 2021-03-08 LAB
ALBUMIN SERPL BCP-MCNC: 2.3 G/DL (ref 3.5–5)
ALP SERPL-CCNC: 83 U/L (ref 46–116)
ALT SERPL W P-5'-P-CCNC: 19 U/L (ref 12–78)
ANION GAP SERPL CALCULATED.3IONS-SCNC: 5 MMOL/L (ref 4–13)
AST SERPL W P-5'-P-CCNC: 21 U/L (ref 5–45)
ATRIAL RATE: 101 BPM
BILIRUB SERPL-MCNC: 1.04 MG/DL (ref 0.2–1)
BUN SERPL-MCNC: 16 MG/DL (ref 5–25)
CALCIUM ALBUM COR SERPL-MCNC: 9.5 MG/DL (ref 8.3–10.1)
CALCIUM SERPL-MCNC: 8.1 MG/DL (ref 8.3–10.1)
CHLORIDE SERPL-SCNC: 102 MMOL/L (ref 100–108)
CO2 SERPL-SCNC: 28 MMOL/L (ref 21–32)
CREAT SERPL-MCNC: 1.13 MG/DL (ref 0.6–1.3)
ERYTHROCYTE [DISTWIDTH] IN BLOOD BY AUTOMATED COUNT: 13.1 % (ref 11.6–15.1)
GFR SERPL CREATININE-BSD FRML MDRD: 53 ML/MIN/1.73SQ M
GLUCOSE SERPL-MCNC: 108 MG/DL (ref 65–140)
HCT VFR BLD AUTO: 34.6 % (ref 34.8–46.1)
HGB BLD-MCNC: 10.9 G/DL (ref 11.5–15.4)
MCH RBC QN AUTO: 27.5 PG (ref 26.8–34.3)
MCHC RBC AUTO-ENTMCNC: 31.5 G/DL (ref 31.4–37.4)
MCV RBC AUTO: 87 FL (ref 82–98)
NRBC BLD AUTO-RTO: 0 /100 WBCS
P AXIS: 68 DEGREES
PLATELET # BLD AUTO: 209 THOUSANDS/UL (ref 149–390)
PMV BLD AUTO: 9.6 FL (ref 8.9–12.7)
POTASSIUM SERPL-SCNC: 3.6 MMOL/L (ref 3.5–5.3)
PR INTERVAL: 174 MS
PROCALCITONIN SERPL-MCNC: 2.44 NG/ML
PROT SERPL-MCNC: 6.7 G/DL (ref 6.4–8.2)
QRS AXIS: 85 DEGREES
QRSD INTERVAL: 108 MS
QT INTERVAL: 350 MS
QTC INTERVAL: 453 MS
RBC # BLD AUTO: 3.97 MILLION/UL (ref 3.81–5.12)
SODIUM SERPL-SCNC: 135 MMOL/L (ref 136–145)
T WAVE AXIS: 60 DEGREES
VENTRICULAR RATE: 101 BPM
WBC # BLD AUTO: 21.65 THOUSAND/UL (ref 4.31–10.16)

## 2021-03-08 PROCEDURE — 94760 N-INVAS EAR/PLS OXIMETRY 1: CPT

## 2021-03-08 PROCEDURE — 93922 UPR/L XTREMITY ART 2 LEVELS: CPT | Performed by: SURGERY

## 2021-03-08 PROCEDURE — 94660 CPAP INITIATION&MGMT: CPT

## 2021-03-08 PROCEDURE — 84145 PROCALCITONIN (PCT): CPT | Performed by: NURSE PRACTITIONER

## 2021-03-08 PROCEDURE — 93923 UPR/LXTR ART STDY 3+ LVLS: CPT

## 2021-03-08 PROCEDURE — 97163 PT EVAL HIGH COMPLEX 45 MIN: CPT

## 2021-03-08 PROCEDURE — 93925 LOWER EXTREMITY STUDY: CPT | Performed by: SURGERY

## 2021-03-08 PROCEDURE — 99233 SBSQ HOSP IP/OBS HIGH 50: CPT | Performed by: FAMILY MEDICINE

## 2021-03-08 PROCEDURE — 85027 COMPLETE CBC AUTOMATED: CPT | Performed by: FAMILY MEDICINE

## 2021-03-08 PROCEDURE — 80053 COMPREHEN METABOLIC PANEL: CPT | Performed by: FAMILY MEDICINE

## 2021-03-08 PROCEDURE — 93925 LOWER EXTREMITY STUDY: CPT

## 2021-03-08 RX ORDER — ASPIRIN 81 MG/1
81 TABLET ORAL DAILY
Status: DISCONTINUED | OUTPATIENT
Start: 2021-03-08 | End: 2021-03-12 | Stop reason: HOSPADM

## 2021-03-08 RX ORDER — ONDANSETRON 2 MG/ML
4 INJECTION INTRAMUSCULAR; INTRAVENOUS EVERY 4 HOURS PRN
Status: DISCONTINUED | OUTPATIENT
Start: 2021-03-08 | End: 2021-03-12 | Stop reason: HOSPADM

## 2021-03-08 RX ORDER — HEPARIN SODIUM 5000 [USP'U]/ML
5000 INJECTION, SOLUTION INTRAVENOUS; SUBCUTANEOUS EVERY 8 HOURS SCHEDULED
Status: DISCONTINUED | OUTPATIENT
Start: 2021-03-08 | End: 2021-03-12 | Stop reason: HOSPADM

## 2021-03-08 RX ADMIN — PIPERACILLIN AND TAZOBACTAM 3.38 G: 36; 4.5 INJECTION, POWDER, FOR SOLUTION INTRAVENOUS at 05:28

## 2021-03-08 RX ADMIN — VANCOMYCIN HYDROCHLORIDE 1750 MG: 5 INJECTION, POWDER, LYOPHILIZED, FOR SOLUTION INTRAVENOUS at 19:20

## 2021-03-08 RX ADMIN — SPIRONOLACTONE 50 MG: 25 TABLET, FILM COATED ORAL at 08:25

## 2021-03-08 RX ADMIN — FOLIC ACID 1 MG: 1 TABLET ORAL at 08:24

## 2021-03-08 RX ADMIN — LEVOTHYROXINE SODIUM 125 MCG: 125 TABLET ORAL at 05:27

## 2021-03-08 RX ADMIN — ASPIRIN 81 MG: 81 TABLET, COATED ORAL at 15:28

## 2021-03-08 RX ADMIN — PIPERACILLIN AND TAZOBACTAM 3.38 G: 36; 4.5 INJECTION, POWDER, FOR SOLUTION INTRAVENOUS at 18:27

## 2021-03-08 RX ADMIN — HEPARIN SODIUM 5000 UNITS: 5000 INJECTION INTRAVENOUS; SUBCUTANEOUS at 21:38

## 2021-03-08 RX ADMIN — BUPROPION 300 MG: 150 TABLET, EXTENDED RELEASE ORAL at 08:24

## 2021-03-08 RX ADMIN — HYDROCODONE BITARTRATE AND ACETAMINOPHEN 1 TABLET: 5; 325 TABLET ORAL at 16:23

## 2021-03-08 RX ADMIN — PIPERACILLIN AND TAZOBACTAM 3.38 G: 36; 4.5 INJECTION, POWDER, FOR SOLUTION INTRAVENOUS at 11:58

## 2021-03-08 RX ADMIN — FLUOXETINE 20 MG: 20 CAPSULE ORAL at 08:24

## 2021-03-08 RX ADMIN — HEPARIN SODIUM 5000 UNITS: 5000 INJECTION INTRAVENOUS; SUBCUTANEOUS at 15:30

## 2021-03-08 RX ADMIN — Medication 2000 UNITS: at 08:24

## 2021-03-08 RX ADMIN — VANCOMYCIN HYDROCHLORIDE 1750 MG: 5 INJECTION, POWDER, LYOPHILIZED, FOR SOLUTION INTRAVENOUS at 06:55

## 2021-03-08 RX ADMIN — HYDROCODONE BITARTRATE AND ACETAMINOPHEN 1 TABLET: 5; 325 TABLET ORAL at 05:27

## 2021-03-08 NOTE — PROGRESS NOTES
Progress Note - Bhargav Mays 1960, 64 y o  female MRN: [id number]    Unit/Bed#: -01 Encounter: [id number]    Primary Care Provider: Trniidad Castillo DO   Date and time admitted to hospital: 3/7/2021  4:43 PM        Cellulitis of right lower extremity  Assessment & Plan  Continue IV antibiotic with vancomycin and Zosyn  Follow pharmacy consult  Follow wound care consultation    * Sepsis [facility])  Assessment & Plan  Upon arrival, patient was tachycardic, WBC is 32+, known source of infection-cellulitis  Lactic acid normal  Follow-up blood culture, urine culture  Patient started on IV antibiotic with vancomycin and Zosyn-continue  Follow wound care consultation  Continue to monitor    JESSE (obstructive sleep apnea)  Assessment & Plan  Patient does use CPAP at home  Continue CPAP  Follow respiratory protocol    PAD (peripheral artery disease) ([facility])  Assessment & Plan  Diffuse disease noted as per venous duplex  Continue aspirin on outpatient basis    Acquired hypothyroidism  Assessment & Plan  Continue thyroid medication    Morbid obesity with BMI of 70 and over, adult [facility])  Assessment & Plan  Low-fat, low-salt diet  Patient already evaluated by Sleep Medicine, and diagnosed with severe sleep apnea, requiring CPAP  As per chart review, patient was referred to Bariatric surgery prior    Lymphedema  Assessment & Plan  Chronic in nature    VTE Pharmacologic Prophylaxis:   Pharmacologic: Heparin  Mechanical VTE Prophylaxis in Place: Yes    Patient Centered Rounds: I have performed bedside rounds with nursing staff today  Education and Discussions with Family / Patient:  Yes with patient    Time Spent for Care: 20 minutes  More than 50% of total time spent on counseling and coordination of care as described above      Current Length of Stay: 1 day(s)    Current Patient Status: Inpatient   Certification Statement: The patient will continue to require additional inpatient hospital stay due to Cellulitis, sepsis    Discharge Plan / Estimated Discharge Date: To be determined      Code Status: Level 1 - Full Code      Subjective:   Seen and evaluated during the round  No significant overnight issues  Objective:     Vitals:   Temp (24hrs), Av 9 °F (37 2 °C), Min:98 4 °F (36 9 °C), Max:99 4 °F (37 4 °C)    Temp:  [98 4 °F (36 9 °C)-99 4 °F (37 4 °C)] 99 4 °F (37 4 °C)  HR:  [] 91  Resp:  [18-20] 18  BP: (112-166)/(64-65) 116/65  SpO2:  [94 %-97 %] 94 %  Body mass index is 71 94 kg/m²  Input and Output Summary (last 24 hours): Intake/Output Summary (Last 24 hours) at 3/8/2021 1333  Last data filed at 3/8/2021 6816  Gross per 24 hour   Intake 2420 ml   Output 1300 ml   Net 1120 ml       Physical Exam:     Physical Exam  Vitals signs and nursing note reviewed  Exam conducted with a chaperone present  Constitutional:       Appearance: She is obese  She is not diaphoretic  HENT:      Mouth/Throat:      Pharynx: No oropharyngeal exudate  Eyes:      General: No scleral icterus  Conjunctiva/sclera: Conjunctivae normal       Pupils: Pupils are equal, round, and reactive to light  Cardiovascular:      Rate and Rhythm: Normal rate  Heart sounds: No friction rub  No gallop  Pulmonary:      Effort: Pulmonary effort is normal  No respiratory distress  Breath sounds: No rales  Abdominal:      General: Abdomen is flat  Bowel sounds are normal    Musculoskeletal:         General: Tenderness present  Right lower leg: Edema present  Left lower leg: Edema present  Lymphadenopathy:      Cervical: No cervical adenopathy  Skin:     Capillary Refill: Capillary refill takes less than 2 seconds  Findings: Erythema and rash present  Neurological:      Mental Status: She is alert and oriented to person, place, and time  Cranial Nerves: No cranial nerve deficit  Sensory: No sensory deficit  Motor: No weakness        Coordination: Coordination normal  Additional Data:     Labs:    Results from last 7 days   Lab Units 03/08/21  0613 03/07/21  1721   WBC Thousand/uL 21 65* 32 35*   HEMOGLOBIN g/dL 10 9* 11 8   HEMATOCRIT % 34 6* 36 7   PLATELETS Thousands/uL 209 269   LYMPHO PCT %  --  1*   MONO PCT %  --  5   EOS PCT %  --  0     Results from last 7 days   Lab Units 03/08/21  0515   POTASSIUM mmol/L 3 6   CHLORIDE mmol/L 102   CO2 mmol/L 28   BUN mg/dL 16   CREATININE mg/dL 1 13   CALCIUM mg/dL 8 1*   ALK PHOS U/L 83   ALT U/L 19   AST U/L 21     Results from last 7 days   Lab Units 03/07/21  1721   INR  1 23*       * I Have Reviewed All Lab Data Listed Above  * Additional Pertinent Lab Tests Reviewed: All Labs Within Last 24 Hours Reviewed      Recent Cultures (last 7 days):     Results from last 7 days   Lab Units 03/07/21  1722 03/07/21  1721   BLOOD CULTURE  Received in Microbiology Lab  Culture in Progress  Received in Microbiology Lab  Culture in Progress         Last 24 Hours Medication List:   Current Facility-Administered Medications   Medication Dose Route Frequency Provider Last Rate    buPROPion  300 mg Oral Daily Shirley Lyon MD      cholecalciferol  2,000 Units Oral Daily Shirley Lyon MD      FLUoxetine  20 mg Oral Daily Shirley Lyon MD      folic acid  1 mg Oral Daily Curtis Bustamante MD      heparin (porcine)  5,000 Units Subcutaneous Q8H [facility] Curtis Ricks MD      HYDROcodone-acetaminophen  1 tablet Oral Q6H PRN Shirley Lyon MD      levothyroxine  125 mcg Oral Early Morning Shirley Lyon MD      ondansetron  4 mg Intravenous Q4H PRN Shirley Lyon MD      piperacillin-tazobactam  3 375 g Intravenous Q6H Jacob Bustamante MD 3 375 g (03/08/21 7893)    spironolactone  50 mg Oral Daily Shirley Lyon MD      vancomycin  17 5 mg/kg (Adjusted) Intravenous Q12H Shirley Lyon MD 1,750 mg (03/08/21 8795)        Today, Patient Was Seen By: Shirley Lyon MD    ** Please Note: Dragon 360 Dictation voice to text software may have been used in the creation of this document   **

## 2021-03-08 NOTE — PHYSICAL THERAPY NOTE
PHYSICAL THERAPY EVALUATION  NAME:  Eleni Alaniz  DATE: 03/08/21    AGE:   64 y o  Mrn:   [id number]  ADMIT DX:  Shortness of breath [R06 02]  Left leg cellulitis [V61 743]  Sepsis ([facility] [address] ) [A41 9]    Past Medical History:   Diagnosis Date    Disease of thyroid gland     Lymphedema     Morbid obesity due to excess calories ([facility] [address] )     Renal disorder      Length Of Stay: 1  Performed at least 2 patient identifiers during session: Name and Birthday  PHYSICAL THERAPY EVALUATION :      03/08/21 1400   PT Last Visit   PT Visit Date 03/08/21   Note Type   Note type Evaluation   Pain Assessment   Pain Assessment Tool 0-10   Pain Score 8   Pain Location/Orientation Orientation: Left; Location: Leg   Home Living   Type of Home House  (1 small CORDELIA)   Home Layout One level   Bathroom Shower/Tub Tub/shower unit  (however reports sponge bathing  washes hair in sink)   Bathroom Toilet Raised   Bathroom Equipment Grab bars around toilet   [address] [facility]; Wheelchair-manual;Cane  (rollator, 2 canes)   Additional Comments Reports living in a 1Sh with 1 small CORDELIA  Reports using 2 spcs in the home usually, rollator walker for outside the home and doesn't use wheelchair unless "weak"  Reports sleeping in a twin bed  Prior Function   Level of [place] Independent with ADLs and functional mobility   Lives With Spouse; Son   Maurisio Help From Family   ADL Assistance Independent   IADLs Independent  (- driving, dtr takes her to store  uses scooter)   Falls in the last 6 months 0   Vocational   (2 sons)   Comments Reports being independent with 2 canes in the home and rollaotr outside the home  completes 1 CORDELIA with rollator with family standing by as she doesn't usually leave the house herself  Restrictions/Precautions   Other Precautions Chair Alarm; Bed Alarm; Fall Risk;Pain;Multiple lines   Cognition   Orientation Level Oriented X4   Following Commands Follows one step commands without difficulty   RLE Assessment   RLE Assessment WFL  (full ROM limited by body habitus)   Strength RLE   RLE Overall Strength 4-/5   LLE Assessment   LLE Assessment   (minimal limited by pain)   Strength LLE   LLE Overall Strength 3-/5   Light Touch   RLE Light Touch Grossly intact   LLE Light Touch Grossly intact  (hypersensitive)   Bed Mobility   Additional Comments bed positioned in chair position for foot exit for safe transfer attempts  Transfers   Sit to Stand 4  Minimal assistance   Additional items Assist x 1; Increased time required;Verbal cues  (SBA of another for safety)   Stand to Sit 4  Minimal assistance   Additional items Assist x 1; Increased time required;Verbal cues   Stand pivot 4  Minimal assistance   Additional items Assist x 1; Increased time required;Verbal cues   Additional Comments use of RW/ sit<>stand with minAx1 withcues for upright posture upon standing  spt with RW with miNAx1 with decreased stance L LE  wide GLADYS  cues for turning completely  Ambulation/Elevation   Gait pattern Wide GLADYS; Excessively slow; Short stride; Antalgic;Decreased L stance   Gait Assistance 4  Minimal assist   Additional items Assist x 1;Verbal cues   Assistive Device Rolling walker   Distance 4'x1 with RW with minAx1 with wide GLADYS, decreased stance L LE associated with pain  Balance   Static Sitting Fair   Dynamic Sitting Fair -   Static Standing Fair -   Dynamic Standing Poor +   Ambulatory Poor +   Endurance Deficit   Endurance Deficit Yes   Endurance Deficit Description min ALDANA, pain and fatigue   Activity Tolerance   Activity Tolerance Patient limited by fatigue;Patient limited by pain   Nurse Made Aware Karla JERONIMO   Assessment   Prognosis Good   Problem List Decreased strength;Decreased endurance; Impaired balance;Decreased range of motion;Decreased mobility; Decreased safety awareness; Obesity; Decreased skin integrity;Pain   Barriers to Discharge Inaccessible home environment   Barriers to Discharge Comments limited activity tolerance   Goals Patient Goals "Go home"   [facility] Expiration Date 03/18/21   PT Treatment Day 0   Plan   Treatment/Interventions Functional transfer training;LE strengthening/ROM; Therapeutic exercise; Endurance training;Patient/family training;Equipment eval/education; Bed mobility;Gait training; Compensatory technique education;Spoke to nursing;Spoke to case management;Elevations   PT Frequency Other (Comment)  (3-5x/week)   Recommendation   PT Discharge Recommendation Home with skilled therapy; Return to previous environment with social support  (HHPT pending progress and amb  if not, consider PAR  )   Equipment Recommended   (pt has rollator walker)   Additional Comments pt sitting in recliner chair with all needs within reach and posey alarm on   AM-PAC Basic Mobility Inpatient   Turning in Bed Without Bedrails 2   Lying on Back to Sitting on Edge of Flat Bed 2   Moving Bed to Chair 3   Standing Up From Chair 3   Walk in Room 3   Climb 3-5 Stairs 2   Basic Mobility Inpatient Raw Score 15   Basic Mobility Standardized Score 36 97     (Please find full objective findings from PT assessment regarding body systems outlined above)  Assessment: Pt is a 64 y o  female seen for PT evaluation s/p admission to [address] [place] [address] on 3/7/2021 with Sepsis [facility])  Order placed for PT services    Upon evaluation: Pt is presenting with impaired functional mobility due to pain, decreased strength, decreased ROM, decreased endurance, impaired balance, gait deviations, altered sensation, decreased safety awareness, impaired judgment, fall risk, LE edema and impaired skin integrity requiring minimal assistance for transfers and minimal assistance for ambulation with RW  Pt's clinical presentation is currently unstable/unpredictable given the functional mobility deficits above, especially weakness, decreased ROM, edema of extremities, decreased skin integrity, decreased endurance, gait deviations, pain, decreased activity tolerance, decreased functional mobility tolerance, altered sensation, decreased safety awareness, impaired judgement and SOB upon exertion, coupled with fall risks as indicated by AM-PAC 6-Clicks: 94/61 as well as impaired balance, polypharmacy, impaired judgement, decreased safety awareness and limited sensation/neuropathy and combined with medical complications of pain impacting overall mobility status, abnormal H&H, abnormal WBCs and abnormal sodium values  Pt's PMHx and comorbidities that may affect physical performance and progress include: hypothyroidism, lymphedema, renal disorder, morbidly obese  Personal factors affecting pt at time of IE include: inaccessible home environment, step(s) to enter environment, inability to perform IADLs, inability to perform ADLs, inability to navigate level surfaces without external assistance and inability to ambulate household distances  Pt will benefit from continued skilled PT services to address deficits as defined above and to maximize level of functional mobility to facilitate return toward PLOF and improved QOL  From PT/mobility standpoint, recommendation at time of d/c would be Home PT vs  STR, home with family support and with walker pending progress in order to reduce fall risk and maximize pt's functional independence and consistency with mobility in order to facilitate return to PLOF  Recommend ther ex next 1-2 sessions  The patient's AM-PAC Basic Mobility Inpatient Short Form Raw Score is 15, Standardized Score is 36 97  A standardized score less than 42 9 suggests the patient may benefit from discharge to post-acute rehabilitation services  Please also refer to the recommendation of the Physical Therapist for safe discharge planning  Anticipate progress with medical management for decreased pain to return to home with assistance from family prn  Goals: Pt will: Perform bed mobility tasks with modified I to reposition in bed and prepare for transfers   Pt will perform transfers with modified I to increase Indep in home environment, decrease burden of care, decrease risk for falls and improve activity tolerance and prepare for ambulation  Pt will ambulate with RW for >/= 48' with  Supervision  to increase Indep in home environment, decrease burden of care, decrease risk for falls and improve activity tolerance and to access home environment  Pt will complete >/= 1 step with RW with consistent min A of 1 to increase Indep in home environment, decrease burden of care, return to home with CORDELIA, decrease risk for falls and improve activity tolerance  Pt will increase B LE strength >/= 1/2 MMT grade to facilitate functional mobility        Mode Plascencia, PT,DPT

## 2021-03-08 NOTE — DISCHARGE INSTR - OTHER ORDERS
1-Cleanse buttocks with soap and water apply Calazine cream  2-Elevate heels to offload pressure  3-Ehob cushion in chair when out of bed  4-Moisturize skin daily with skin nourishing cream   5-Turn/reposition q2h or when medically stable for pressure re-distribution on skin  6-lower extremity chronic venous ulcers- Gently wash lower legs with soap and water, pat dry  Apply Remedy nourishing cream to intact skin then Apply Dermagran to open areas, cover with ABD then parish wrap  7- Keep area below breast clean and dry avoid skin to skin friction    8-P500 low air loss mattress

## 2021-03-08 NOTE — WOUND OSTOMY CARE
Consult Note - Wound   Edy Elisabeth 64 y o  female MRN: [id number]  Unit/Bed#: -01 Encounter: [id number]        History and Present Illness:  65 yo female left leg cellulitis  Hospitalized in Dec right lower extremity cellulitis  Morbidy obese  Alert orient, maximum assist with repositioning  Using pure-wik for bladder management   Pt son changes lower extremity wounds QOD at home  Assessment Findings:  -Bilateral heels dry callous    -Buttock Intertriginous Dermatitis-apply Calazime after cleansing with soap and water   -Left and right leg anterior pretibial- Left leg erythema warmth midthigh to knee  Left knee very painful on palpation  Pretibal area with 2 dry pink wounds  Surrounding skin with dry flaky skin  No drainage on dsd of either leg  Legs washed with soap and water, nourishing cream applied  Dermagran to open areas both legs, followed by Abd parish wrap  -Area below breasts  Pink moist skin intact  Interginious Dermatitis  Wounds:  Wound 03/07/21 Venous Ulcer Pretibial Right (Active)   Wound Image   03/08/21 1049   Wound Description Beefy red 03/07/21 2000   Edel-wound Assessment Callus; Erythema;Edema;Scaly 03/07/21 2000   Treatments Site care 03/07/21 2000   Dressing ABD 03/07/21 2000   Dressing Changed New 03/07/21 2000   Patient Tolerance Tolerated well 03/07/21 2000   Dressing Status Clean;Dry; Intact 03/07/21 2000       Wound 03/07/21 Venous Ulcer Pretibial Left (Active)   Wound Image   03/08/21 1047   Wound Description Beefy red 03/07/21 2000   Edel-wound Assessment Edema; Erythema; Hyperpigmented;Callus;Scaly 03/07/21 2000   Wound Length (cm) 11 cm 03/08/21 1047   Wound Width (cm) 7 cm 03/08/21 1047   Wound Surface Area (cm^2) 77 cm^2 03/08/21 1047   Treatments Site care 03/07/21 2000   Dressing ABD 03/07/21 2000   Dressing Changed New 03/07/21 2000   Patient Tolerance Tolerated well 03/07/21 2000   Dressing Status Clean;Dry; Intact 03/07/21 2000       Wound 03/07/21 Other (comment) Abdomen Anterior;Right (Active)   Wound Image   03/07/21 2000   Edel-wound Assessment Dry 03/07/21 2000   Treatments Cleansed 03/07/21 2000   Patient Tolerance Tolerated well 03/07/21 2000       Wound 03/07/21 Other (comment) Breast Left; Lower (Active)   Wound Image   03/08/21 1108   Edel-wound Assessment Intact 03/07/21 2000   Treatments Cleansed;Site care 03/07/21 2000   Patient Tolerance Tolerated well 03/07/21 2000       Wound 03/07/21 Other (comment) Breast Right; Lower (Active)   Wound Image   03/07/21 2000   Edel-wound Assessment Intact 03/07/21 2000   Treatments Cleansed 03/07/21 2000   Patient Tolerance Tolerated well 03/07/21 2000         Skin care plans:  1-Cleanse buttocks with soap and water apply Calazine cream  2-Elevate heels to offload pressure  3-Ehob cushion in chair when out of bed  4-Moisturize skin daily with skin nourishing cream   5-Turn/reposition q2h or when medically stable for pressure re-distribution on skin  6-lower extremity chronic venous ulcers- Gently wash lower legs with soap and water, pat dry  Apply Remedy nourishing cream to intact skin then Apply Dermagran to open areas, cover with ABD then parish wrap  7- Keep area below breast clean and dry avoid skin to skin friction  8-P500 low air loss mattress  Patient discussed with Viji Allison or ronel with any questions  Wound Care will continue to follow    Sean Black RN

## 2021-03-08 NOTE — PLAN OF CARE
Problem: Potential for Falls  Goal: Patient will remain free of falls  Description: INTERVENTIONS:  - Assess patient frequently for physical needs  -  Identify cognitive and physical deficits and behaviors that affect risk of falls    -  [place] fall precautions as indicated by assessment   - Educate patient/family on patient safety including physical limitations  - Instruct patient to call for assistance with activity based on assessment  - Modify environment to reduce risk of injury  - Consider OT/PT consult to assist with strengthening/mobility  Outcome: Progressing     Problem: Prexisting or High Potential for Compromised Skin Integrity  Goal: Skin integrity is maintained or improved  Description: INTERVENTIONS:  - Identify patients at risk for skin breakdown  - Assess and monitor skin integrity  - Assess and monitor nutrition and hydration status  - Monitor labs   - Assess for incontinence   - Turn and reposition patient  - Assist with mobility/ambulation  - Relieve pressure over bony prominences  - Avoid friction and shearing  - Provide appropriate hygiene as needed including keeping skin clean and dry  - Evaluate need for skin moisturizer/barrier cream  - Collaborate with interdisciplinary team   - Patient/family teaching  - Consider wound care consult   Outcome: Progressing     Problem: PAIN - ADULT  Goal: Verbalizes/displays adequate comfort level or baseline comfort level  Description: Interventions:  - Encourage patient to monitor pain and request assistance  - Assess pain using appropriate pain scale  - Administer analgesics based on type and severity of pain and evaluate response  - Implement non-pharmacological measures as appropriate and evaluate response  - Consider cultural and social influences on pain and pain management  - Notify physician/advanced practitioner if interventions unsuccessful or patient reports new pain  Outcome: Progressing     Problem: INFECTION - ADULT  Goal: Absence or prevention of progression during hospitalization  Description: INTERVENTIONS:  - Assess and monitor for signs and symptoms of infection  - Monitor lab/diagnostic results  - Monitor all insertion sites, i e  indwelling lines, tubes, and drains  - Monitor endotracheal if appropriate and nasal secretions for changes in amount and color  - [place] appropriate cooling/warming therapies per order  - Administer medications as ordered  - Instruct and encourage patient and family to use good hand hygiene technique  - Identify and instruct in appropriate isolation precautions for identified infection/condition  Outcome: Progressing  Goal: Absence of fever/infection during neutropenic period  Description: INTERVENTIONS:  - Monitor WBC    Outcome: Progressing     Problem: SAFETY ADULT  Goal: Patient will remain free of falls  Description: INTERVENTIONS:  - Assess patient frequently for physical needs  -  Identify cognitive and physical deficits and behaviors that affect risk of falls    -  [place] fall precautions as indicated by assessment   - Educate patient/family on patient safety including physical limitations  - Instruct patient to call for assistance with activity based on assessment  - Modify environment to reduce risk of injury  - Consider OT/PT consult to assist with strengthening/mobility  Outcome: Progressing  Goal: Maintain or return to baseline ADL function  Description: INTERVENTIONS:  -  Assess patient's ability to carry out ADLs; assess patient's baseline for ADL function and identify physical deficits which impact ability to perform ADLs (bathing, care of mouth/teeth, toileting, grooming, dressing, etc )  - Assess/evaluate cause of self-care deficits   - Assess range of motion  - Assess patient's mobility; develop plan if impaired  - Assess patient's need for assistive devices and provide as appropriate  - Encourage maximum independence but intervene and supervise when necessary  - Involve family in performance of ADLs  - Assess for home care needs following discharge   - Consider OT consult to assist with ADL evaluation and planning for discharge  - Provide patient education as appropriate  Outcome: Progressing  Goal: Maintain or return mobility status to optimal level  Description: INTERVENTIONS:  - Assess patient's baseline mobility status (ambulation, transfers, stairs, etc )    - Identify cognitive and physical deficits and behaviors that affect mobility  - Identify mobility aids required to assist with transfers and/or ambulation (gait belt, sit-to-stand, lift, walker, cane, etc )  - [place] fall precautions as indicated by assessment  - Record patient progress and toleration of activity level on Mobility SBAR; progress patient to next Phase/Stage  - Instruct patient to call for assistance with activity based on assessment  - Consider rehabilitation consult to assist with strengthening/weightbearing, etc   Outcome: Progressing     Problem: DISCHARGE PLANNING  Goal: Discharge to home or other facility with appropriate resources  Description: INTERVENTIONS:  - Identify barriers to discharge w/patient and caregiver  - Arrange for needed discharge resources and transportation as appropriate  - Identify discharge learning needs (meds, wound care, etc )  - Arrange for interpretive services to assist at discharge as needed  - Refer to Case Management Department for coordinating discharge planning if the patient needs post-hospital services based on physician/advanced practitioner order or complex needs related to functional status, cognitive ability, or social support system  Outcome: Progressing     Problem: Knowledge Deficit  Goal: Patient/family/caregiver demonstrates understanding of disease process, treatment plan, medications, and discharge instructions  Description: Complete learning assessment and assess knowledge base    Interventions:  - Provide teaching at level of understanding  - Provide teaching via preferred learning methods  Outcome: Progressing     Problem: Nutrition/Hydration-ADULT  Goal: Nutrient/Hydration intake appropriate for improving, restoring or maintaining nutritional needs  Description: Monitor and assess patient's nutrition/hydration status for malnutrition  Collaborate with interdisciplinary team and initiate plan and interventions as ordered  Monitor patient's weight and dietary intake as ordered or per policy  Utilize nutrition screening tool and intervene as necessary  Determine patient's food preferences and provide high-protein, high-caloric foods as appropriate       INTERVENTIONS:  - Monitor oral intake, urinary output, labs, and treatment plans  - Assess nutrition and hydration status and recommend course of action  - Evaluate amount of meals eaten  - Assist patient with eating if necessary   - Allow adequate time for meals  - Recommend/ encourage appropriate diets, oral nutritional supplements, and vitamin/mineral supplements  - Order, calculate, and assess calorie counts as needed  - Recommend, monitor, and adjust tube feedings and TPN/PPN based on assessed needs  - Assess need for intravenous fluids  - Provide specific nutrition/hydration education as appropriate  - Include patient/family/caregiver in decisions related to nutrition  Outcome: Progressing

## 2021-03-08 NOTE — RESPIRATORY THERAPY NOTE
RT Ventilator Management Note  Mohit Naida 64 y o  female MRN: [id number]  Unit/Bed#: -01 Encounter: [id number]      Daily Screen     No data found in the last 10 encounters  Physical Exam:   Assessment Type: Assess only  General Appearance: Alert, Awake  Respiratory Pattern: Normal  Chest Assessment: Chest expansion symmetrical  Bilateral Breath Sounds: Diminished, Clear  Subjective Data: placed patient on hours of sleep CPA at 2200  Patient remined stable on CPAP sleeping soundly until 0400 when she removed her mask and said she wanted a break          Resp Comments: found off CPAP

## 2021-03-08 NOTE — ASSESSMENT & PLAN NOTE
Upon arrival, patient was tachycardic, WBC is 32+, known source of infection-cellulitis  Lactic acid normal  Follow-up blood culture, urine culture  Patient started on IV antibiotic with vancomycin and Zosyn-continue  Follow wound care consultation  Continue to monitor

## 2021-03-08 NOTE — PLAN OF CARE
Problem: Potential for Falls  Goal: Patient will remain free of falls  Description: INTERVENTIONS:  - Assess patient frequently for physical needs  -  Identify cognitive and physical deficits and behaviors that affect risk of falls    -  [place] fall precautions as indicated by assessment   - Educate patient/family on patient safety including physical limitations  - Instruct patient to call for assistance with activity based on assessment  - Modify environment to reduce risk of injury  - Consider OT/PT consult to assist with strengthening/mobility  Outcome: Progressing     Problem: Prexisting or High Potential for Compromised Skin Integrity  Goal: Skin integrity is maintained or improved  Description: INTERVENTIONS:  - Identify patients at risk for skin breakdown  - Assess and monitor skin integrity  - Assess and monitor nutrition and hydration status  - Monitor labs   - Assess for incontinence   - Turn and reposition patient  - Assist with mobility/ambulation  - Relieve pressure over bony prominences  - Avoid friction and shearing  - Provide appropriate hygiene as needed including keeping skin clean and dry  - Evaluate need for skin moisturizer/barrier cream  - Collaborate with interdisciplinary team   - Patient/family teaching  - Consider wound care consult   Outcome: Progressing     Problem: PAIN - ADULT  Goal: Verbalizes/displays adequate comfort level or baseline comfort level  Description: Interventions:  - Encourage patient to monitor pain and request assistance  - Assess pain using appropriate pain scale  - Administer analgesics based on type and severity of pain and evaluate response  - Implement non-pharmacological measures as appropriate and evaluate response  - Consider cultural and social influences on pain and pain management  - Notify physician/advanced practitioner if interventions unsuccessful or patient reports new pain  Outcome: Progressing     Problem: INFECTION - ADULT  Goal: Absence or prevention of progression during hospitalization  Description: INTERVENTIONS:  - Assess and monitor for signs and symptoms of infection  - Monitor lab/diagnostic results  - Monitor all insertion sites, i e  indwelling lines, tubes, and drains  - Monitor endotracheal if appropriate and nasal secretions for changes in amount and color  - [place] appropriate cooling/warming therapies per order  - Administer medications as ordered  - Instruct and encourage patient and family to use good hand hygiene technique  - Identify and instruct in appropriate isolation precautions for identified infection/condition  Outcome: Progressing     Problem: SAFETY ADULT  Goal: Patient will remain free of falls  Description: INTERVENTIONS:  - Assess patient frequently for physical needs  -  Identify cognitive and physical deficits and behaviors that affect risk of falls    -  [place] fall precautions as indicated by assessment   - Educate patient/family on patient safety including physical limitations  - Instruct patient to call for assistance with activity based on assessment  - Modify environment to reduce risk of injury  - Consider OT/PT consult to assist with strengthening/mobility  Outcome: Progressing  Goal: Maintain or return to baseline ADL function  Description: INTERVENTIONS:  -  Assess patient's ability to carry out ADLs; assess patient's baseline for ADL function and identify physical deficits which impact ability to perform ADLs (bathing, care of mouth/teeth, toileting, grooming, dressing, etc )  - Assess/evaluate cause of self-care deficits   - Assess range of motion  - Assess patient's mobility; develop plan if impaired  - Assess patient's need for assistive devices and provide as appropriate  - Encourage maximum independence but intervene and supervise when necessary  - Involve family in performance of ADLs  - Assess for home care needs following discharge   - Consider OT consult to assist with ADL evaluation and planning for discharge  - Provide patient education as appropriate  Outcome: Progressing  Goal: Maintain or return mobility status to optimal level  Description: INTERVENTIONS:  - Assess patient's baseline mobility status (ambulation, transfers, stairs, etc )    - Identify cognitive and physical deficits and behaviors that affect mobility  - Identify mobility aids required to assist with transfers and/or ambulation (gait belt, sit-to-stand, lift, walker, cane, etc )  - [place] fall precautions as indicated by assessment  - Record patient progress and toleration of activity level on Mobility SBAR; progress patient to next Phase/Stage  - Instruct patient to call for assistance with activity based on assessment  - Consider rehabilitation consult to assist with strengthening/weightbearing, etc   Outcome: Progressing     Problem: DISCHARGE PLANNING  Goal: Discharge to home or other facility with appropriate resources  Description: INTERVENTIONS:  - Identify barriers to discharge w/patient and caregiver  - Arrange for needed discharge resources and transportation as appropriate  - Identify discharge learning needs (meds, wound care, etc )  - Arrange for interpretive services to assist at discharge as needed  - Refer to Case Management Department for coordinating discharge planning if the patient needs post-hospital services based on physician/advanced practitioner order or complex needs related to functional status, cognitive ability, or social support system  Outcome: Progressing     Problem: Knowledge Deficit  Goal: Patient/family/caregiver demonstrates understanding of disease process, treatment plan, medications, and discharge instructions  Description: Complete learning assessment and assess knowledge base    Interventions:  - Provide teaching at level of understanding  - Provide teaching via preferred learning methods  Outcome: Progressing     Problem: Nutrition/Hydration-ADULT  Goal: Nutrient/Hydration intake appropriate for improving, restoring or maintaining nutritional needs  Description: Monitor and assess patient's nutrition/hydration status for malnutrition  Collaborate with interdisciplinary team and initiate plan and interventions as ordered  Monitor patient's weight and dietary intake as ordered or per policy  Utilize nutrition screening tool and intervene as necessary  Determine patient's food preferences and provide high-protein, high-caloric foods as appropriate       INTERVENTIONS:  - Monitor oral intake, urinary output, labs, and treatment plans  - Assess nutrition and hydration status and recommend course of action  - Evaluate amount of meals eaten  - Assist patient with eating if necessary   - Allow adequate time for meals  - Recommend/ encourage appropriate diets, oral nutritional supplements, and vitamin/mineral supplements  - Order, calculate, and assess calorie counts as needed  - Recommend, monitor, and adjust tube feedings and TPN/PPN based on assessed needs  - Assess need for intravenous fluids  - Provide specific nutrition/hydration education as appropriate  - Include patient/family/caregiver in decisions related to nutrition  Outcome: Progressing

## 2021-03-08 NOTE — PLAN OF CARE
Problem: PHYSICAL THERAPY ADULT  Goal: Performs mobility at highest level of function for planned discharge setting  See evaluation for individualized goals  Description: Treatment/Interventions: Functional transfer training, LE strengthening/ROM, Therapeutic exercise, Endurance training, Patient/family training, Equipment eval/education, Bed mobility, Gait training, Compensatory technique education, Spoke to nursing, Spoke to case management, Elevations  Equipment Recommended: (pt has rollator walker)       See flowsheet documentation for full assessment, interventions and recommendations  Note: Prognosis: Good  Problem List: Decreased strength, Decreased endurance, Impaired balance, Decreased range of motion, Decreased mobility, Decreased safety awareness, Obesity, Decreased skin integrity, Pain  Assessment: Pt is a 64 y o  female seen for PT evaluation s/p admission to [address] [place] [address] on 3/7/2021 with Sepsis [facility])  Order placed for PT services    Upon evaluation: Pt is presenting with impaired functional mobility due to pain, decreased strength, decreased ROM, decreased endurance, impaired balance, gait deviations, altered sensation, decreased safety awareness, impaired judgment, fall risk, LE edema and impaired skin integrity requiring minimal assistance for transfers and minimal assistance for ambulation with RW  Pt's clinical presentation is currently unstable/unpredictable given the functional mobility deficits above, especially weakness, decreased ROM, edema of extremities, decreased skin integrity, decreased endurance, gait deviations, pain, decreased activity tolerance, decreased functional mobility tolerance, altered sensation, decreased safety awareness, impaired judgement and SOB upon exertion, coupled with fall risks as indicated by AM-PAC 6-Clicks: 13/13 as well as impaired balance, polypharmacy, impaired judgement, decreased safety awareness and limited sensation/neuropathy and combined with medical complications of pain impacting overall mobility status, abnormal H&H, abnormal WBCs and abnormal sodium values  Pt's PMHx and comorbidities that may affect physical performance and progress include: hypothyroidism, lymphedema, renal disorder, morbidly obese  Personal factors affecting pt at time of IE include: inaccessible home environment, step(s) to enter environment, inability to perform IADLs, inability to perform ADLs, inability to navigate level surfaces without external assistance and inability to ambulate household distances  Pt will benefit from continued skilled PT services to address deficits as defined above and to maximize level of functional mobility to facilitate return toward PLOF and improved QOL  From PT/mobility standpoint, recommendation at time of d/c would be Home PT vs  STR, home with family support and with walker pending progress in order to reduce fall risk and maximize pt's functional independence and consistency with mobility in order to facilitate return to PLOF  Recommend ther ex next 1-2 sessions  Barriers to Discharge: Inaccessible home environment  Barriers to Discharge Comments: limited activity tolerance  PT Discharge Recommendation: Home with skilled therapy, Return to previous environment with social support(HHPT pending progress and amb  if not, consider PAR  )          See flowsheet documentation for full assessment
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
## 2021-03-08 NOTE — PROGRESS NOTES
Vancomycin Assessment    Gerard Ghosh is a 64 y o  female who is currently receiving vancomycin 1750 mg IV once for skin-soft tissue infection  Relevant clinical data and objective history reviewed:  Creatinine   Date Value Ref Range Status   03/07/2021 1 11 0 60 - 1 30 mg/dL Final     Comment:     Standardized to IDMS reference method   01/09/2020 0 88 0 60 - 1 30 mg/dL Final     Comment:     Standardized to IDMS reference method   01/07/2020 0 88 0 60 - 1 30 mg/dL Final     Comment:     Standardized to IDMS reference method     /64 (BP Location: Left arm)   Pulse (!) 112   Temp 98 8 °F (37 1 °C)   Resp 20   Ht 5' 3" (1 6 m)   Wt (!) 183 kg (402 lb 9 oz)   SpO2 95%   BMI 71 31 kg/m²   I/O last 3 completed shifts: In: 2100 [IV Piggyback:2100]  Out: -   Lab Results   Component Value Date/Time    BUN 17 03/07/2021 05:21 PM    WBC 32 35 (HH) 03/07/2021 05:21 PM    HGB 11 8 03/07/2021 05:21 PM    HCT 36 7 03/07/2021 05:21 PM    MCV 87 03/07/2021 05:21 PM     03/07/2021 05:21 PM     Temp Readings from Last 3 Encounters:   03/07/21 98 8 °F (37 1 °C)   06/01/20 97 9 °F (36 6 °C)   01/10/20 98 4 °F (36 9 °C)     Vancomycin Days of Therapy: 1    Assessment/Plan  The patient is currently on vancomycin utilizing scheduled dosing based on adjusted body weight (due to obesity)  Baseline risks associated with therapy include: pre-existing renal impairment, concomitant nephrotoxic medications, advanced age, and dehydration  The patient is currently receiving 1750 mg IV once and after clinical evaluation will be changed to 1750 mg IV q 12 hrs  Pharmacy will also follow closely for s/sx of nephrotoxicity, infusion reactions, and appropriateness of therapy  BMP and CBC will be ordered per protocol  Plan for trough as patient approaches steady state, prior to the 5th  dose at approximately 1830 on 3/9/21  Due to infection severity, will target a trough of 15-20 (appropriate for most indications)   Pharmacy will continue to follow the patients culture results and clinical progress daily      Bill Grande, Pharmacist

## 2021-03-08 NOTE — RESPIRATORY THERAPY NOTE
RT Protocol Note  Renny Mitchell 64 y o  female MRN: [id number]  Unit/Bed#: -01 Encounter: [id number]    Assessment    Principal Problem:    Sepsis ([place] [address] )  Active Problems:    Cellulitis of right lower extremity    Lymphedema    Morbid obesity with BMI of 70 and over, adult ([place] [address] )    Acquired hypothyroidism    JESSE (obstructive sleep apnea)      Home Pulmonary Medications:  Albuterol MDI (doesn't uses)  Home Devices/Therapy: BiPAP/CPAP, Home O2    Past Medical History:   Diagnosis Date    Disease of thyroid gland     Lymphedema     Morbid obesity due to excess calories ([place] [address] )     Renal disorder      Social History     Socioeconomic History    Marital status: /Civil Union     Spouse name: None    Number of children: None    Years of education: None    Highest education level: None   Occupational History    None   Social Needs    Financial resource strain: None    Food insecurity     Worry: None     Inability: None    Transportation needs     Medical: None     Non-medical: None   Tobacco Use    Smoking status: Never Smoker    Smokeless tobacco: Never Used   Substance and Sexual Activity    Alcohol use: Not Currently    Drug use: Never    Sexual activity: Not Currently   Lifestyle    Physical activity     Days per week: None     Minutes per session: None    Stress: None   Relationships    Social connections     Talks on phone: None     Gets together: None     Attends [employer] service: None     Active member of club or organization: None     Attends meetings of clubs or organizations: None     Relationship status: None    Intimate partner violence     Fear of current or ex partner: None     Emotionally abused: None     Physically abused: None     Forced sexual activity: None   Other Topics Concern    None   Social History Narrative    None       Subjective    Subjective Data: Patient is non smoker who was admistted for non respiratory issues    She uses CPAP with 2L O2 at night and reports being complient for the past year  She has an albuterol MDI that she doesn't use  Patient is alaert and oriented    Objective    Physical Exam:   Assessment Type: Assess only  General Appearance: Alert, Awake  Respiratory Pattern: Normal  Chest Assessment: Chest expansion symmetrical  Bilateral Breath Sounds: Diminished, Clear    Vitals:  Blood pressure 112/64, pulse 103, temperature 98 4 °F (36 9 °C), resp  rate 20, height 5' 3" (1 6 m), weight (!) 183 kg (402 lb 9 oz), SpO2 94 %            Imaging and other studies: not performed          Plan    Respiratory Plan: Mild Distress pathway        Resp Comments: hours of sleep

## 2021-03-08 NOTE — ASSESSMENT & PLAN NOTE
Continue IV antibiotic with vancomycin and Zosyn  Follow pharmacy consult      Follow wound care consultation

## 2021-03-08 NOTE — PROGRESS NOTES
Vancomycin IV Pharmacy-to-Dose Consultation    Silke Julien is a 64 y o  female who is currently receiving Vancomycin IV with management by the Pharmacy Consult service  Assessment/Plan:  The patient was reviewed  Renal function is stable and no signs or symptoms of nephrotoxicity and/or infusion reactions were documented in the chart  Based on todays assessment, continue current vancomycin (day # 2) dosing of 1750 mg IV every 12 hours, with a plan for trough to be drawn at 1830 on 3/9/21  We will continue to follow the patients culture results and clinical progress daily      Michelle Mcneil, Pharmacist

## 2021-03-08 NOTE — UTILIZATION REVIEW
Initial Clinical Review    Admission: Date/Time/Statement:   Admission Orders (From admission, onward)     Ordered        03/07/21 1804  Inpatient Admission  Once                   Orders Placed This Encounter   Procedures    Inpatient Admission     Standing Status:   Standing     Number of Occurrences:   1     Order Specific Question:   Level of Care     Answer:   Med Surg [16]     Order Specific Question:   Estimated length of stay     Answer:   More than 2 Midnights     Order Specific Question:   Certification     Answer:   I certify that inpatient services are medically necessary for this patient for a duration of greater than two midnights  See H&P and MD Progress Notes for additional information about the patient's course of treatment  ED Arrival Information     Expected Arrival Acuity Means of Arrival Escorted By Service Admission Type    - 3/7/2021 16:43 Urgent Walk-In Self Hospitalist Urgent    Arrival Complaint    Shortness of Breath        Chief Complaint   Patient presents with    Leg Pain     left leg pain that started last night, son changes her leg dressings  last times she stated she had cellulitis      Assessment/Plan: 64year old female to the ED from home with complaints of left leg pain which started the night prior to arrival   Admitted in Dec 2020 for right leg cellulitis, now her left leg is bothering her  Admitted to inpatient for cellulitis left lower extremity and sepsis  H/O JESSE, morbid obesity with bmi over 70  She arrives tachycardic, WBCs elevated  Blood cultures pending  IV abx  Started  She has chronic lymphedema and erythema to bilateral lower extremities  PRocal elevated  IV fluids started  3/8 UPdate: Continue with IV abx        ED Triage Vitals   Temperature Pulse Respirations Blood Pressure SpO2   03/07/21 1655 03/07/21 1655 03/07/21 1655 03/07/21 1655 03/07/21 1655   98 8 °F (37 1 °C) (!) 112 20 166/64 95 %      Temp Source Heart Rate Source Patient Position - Orthostatic VS BP Location FiO2 (%)   03/07/21 2337 03/07/21 1655 03/07/21 1655 03/07/21 1655 --   Oral Monitor Lying Left arm       Pain Score       03/07/21 1655       6          Wt Readings from Last 1 Encounters:   03/08/21 (!) 184 kg (406 lb 1 4 oz)     Additional Vital Signs:   Time  Temp  Pulse  Resp  BP  MAP (mmHg)  SpO2  O2 Device  O2 Interface Device  Patient Position - Orthostatic VS   03/08/21 08:24:27  99 4 °F (37 4 °C)  91  18  116/65  82  94 %  --  --  --   03/08/21 0214  --  --  --  --  --  97 %  --  Face mask  --   03/07/21 23:37:44  98 4 °F (36 9 °C)  103  18  112/64  80  94 %  CPAP  --  Lying   03/07/21 2237  --  --  --  --  --  97 %  --  Face mask  --   03/07/21 1655  98 8 °F (37 1 °C)  112Abnormal   20  166/64  --  95 %  None (Room air)         Pertinent Labs/Diagnostic Test Results:   3/7 EKG Sinus tachycardia  Otherwise normal ECG  When compared with ECG of 07-JAN-2020 08:40,  No significant change was found      Results from last 7 days   Lab Units 03/08/21  0613 03/07/21  1721   WBC Thousand/uL 21 65* 32 35*   HEMOGLOBIN g/dL 10 9* 11 8   HEMATOCRIT % 34 6* 36 7   PLATELETS Thousands/uL 209 269   BANDS PCT %  --  8         Results from last 7 days   Lab Units 03/08/21  0515 03/07/21  1721   SODIUM mmol/L 135* 135*   POTASSIUM mmol/L 3 6 4 0   CHLORIDE mmol/L 102 100   CO2 mmol/L 28 26   ANION GAP mmol/L 5 9   BUN mg/dL 16 17   CREATININE mg/dL 1 13 1 11   EGFR ml/min/1 73sq m 53 54   CALCIUM mg/dL 8 1* 8 3     Results from last 7 days   Lab Units 03/08/21  0515 03/07/21  1721   AST U/L 21 22   ALT U/L 19 22   ALK PHOS U/L 83 90   TOTAL PROTEIN g/dL 6 7 7 6   ALBUMIN g/dL 2 3* 2 8*   TOTAL BILIRUBIN mg/dL 1 04* 0 84         Results from last 7 days   Lab Units 03/08/21  0515 03/07/21  1721   GLUCOSE RANDOM mg/dL 108 123       Results from last 7 days   Lab Units 03/07/21  1721   TROPONIN I ng/mL <0 02         Results from last 7 days   Lab Units 03/07/21  1721   PROTIME seconds 15 2*   INR 1 23*   PTT seconds 43*         Results from last 7 days   Lab Units 03/08/21  0515   PROCALCITONIN ng/ml 2 44*     Results from last 7 days   Lab Units 03/07/21  1721   LACTIC ACID mmol/L 1 6             Results from last 7 days   Lab Units 03/07/21  1721   LIPASE u/L 34*         Results from last 7 days   Lab Units 03/07/21  1722 03/07/21  1721   BLOOD CULTURE  Received in Microbiology Lab  Culture in Progress  Received in Microbiology Lab  Culture in Progress       Results from last 7 days   Lab Units 03/07/21  1721   TOTAL COUNTED  100     ED Treatment:   Medication Administration from 03/07/2021 1641 to 03/07/2021 1918       Date/Time Order Dose Route Action     03/07/2021 1725 sodium chloride 0 9 % bolus 2,000 mL 2,000 mL Intravenous New Bag     03/07/2021 1842 vancomycin (VANCOCIN) 1,750 mg in sodium chloride 0 9 % 500 mL IVPB 1,750 mg Intravenous New Bag     03/07/2021 1742 piperacillin-tazobactam (ZOSYN) 3 375 g in sodium chloride 0 9 % 100 mL IVPB 3 375 g Intravenous New Bag        Past Medical History:   Diagnosis Date    Disease of thyroid gland     Lymphedema     Morbid obesity due to excess calories (HCC)     Renal disorder      Admitting Diagnosis: Shortness of breath [R06 02]  Left leg cellulitis [L03 116]  Sepsis ([facility] [address] ) [A41 9]  Age/Sex: 64 y o  female  Admission Orders:  UP and OOB  Procal, wound culture    Scheduled Medications:  buPROPion, 300 mg, Oral, Daily  cholecalciferol, 2,000 Units, Oral, Daily  FLUoxetine, 20 mg, Oral, Daily  folic acid, 1 mg, Oral, Daily  levothyroxine, 125 mcg, Oral, Early Morning  piperacillin-tazobactam, 3 375 g, Intravenous, Q6H  spironolactone, 50 mg, Oral, Daily  vancomycin, 17 5 mg/kg (Adjusted), Intravenous, Q12H      Continuous IV Infusions:     PRN Meds:  HYDROcodone-acetaminophen, 1 tablet, Oral, Q6H PRN  ondansetron, 4 mg, Intravenous, Q4H PRN        IP CONSULT TO CASE MANAGEMENT  IP CONSULT TO PHARMACY    Network Utilization Review Department  ATTENTION: Please call with any questions or concerns to [phone] and carefully listen to the prompts so that you are directed to the right person  All voicemails are confidential   Sunitha Muss all requests for admission clinical reviews, approved or denied determinations and any other requests to dedicated fax number below belonging to the campus where the patient is receiving treatment   List of dedicated fax numbers for the Facilities:  [address] DENIALS (Administrative/Medical Necessity) [phone]   [address] (Maternity/NICU/Pediatrics) [phone] [address] Mendoza Stephenson 7831 (  Sumi Rangel "Elizabeth" 103) [address] [place] [address] [address] Traci Hopkins 14[address] [place] [address] [phone]

## 2021-03-09 ENCOUNTER — APPOINTMENT (INPATIENT)
Dept: CT IMAGING | Facility: HOSPITAL | Age: 61
DRG: 872 | End: 2021-03-09
Payer: MEDICARE

## 2021-03-09 ENCOUNTER — APPOINTMENT (INPATIENT)
Dept: NON INVASIVE DIAGNOSTICS | Facility: HOSPITAL | Age: 61
DRG: 872 | End: 2021-03-09
Payer: MEDICARE

## 2021-03-09 PROBLEM — L03.116 CELLULITIS OF LEFT LOWER EXTREMITY: Status: ACTIVE | Noted: 2019-12-28

## 2021-03-09 PROBLEM — R78.81 GRAM-POSITIVE COCCI BACTEREMIA: Status: ACTIVE | Noted: 2021-03-09

## 2021-03-09 LAB
ANION GAP SERPL CALCULATED.3IONS-SCNC: 8 MMOL/L (ref 4–13)
BASOPHILS # BLD AUTO: 0.02 THOUSANDS/ΜL (ref 0–0.1)
BASOPHILS NFR BLD AUTO: 0 % (ref 0–1)
BUN SERPL-MCNC: 15 MG/DL (ref 5–25)
CALCIUM SERPL-MCNC: 8.4 MG/DL (ref 8.3–10.1)
CHLORIDE SERPL-SCNC: 103 MMOL/L (ref 100–108)
CO2 SERPL-SCNC: 24 MMOL/L (ref 21–32)
CREAT SERPL-MCNC: 1.03 MG/DL (ref 0.6–1.3)
EOSINOPHIL # BLD AUTO: 0.14 THOUSAND/ΜL (ref 0–0.61)
EOSINOPHIL NFR BLD AUTO: 1 % (ref 0–6)
ERYTHROCYTE [DISTWIDTH] IN BLOOD BY AUTOMATED COUNT: 12.9 % (ref 11.6–15.1)
GFR SERPL CREATININE-BSD FRML MDRD: 59 ML/MIN/1.73SQ M
GLUCOSE SERPL-MCNC: 107 MG/DL (ref 65–140)
HCT VFR BLD AUTO: 37 % (ref 34.8–46.1)
HGB BLD-MCNC: 11.2 G/DL (ref 11.5–15.4)
IMM GRANULOCYTES # BLD AUTO: 0.07 THOUSAND/UL (ref 0–0.2)
IMM GRANULOCYTES NFR BLD AUTO: 1 % (ref 0–2)
LYMPHOCYTES # BLD AUTO: 0.89 THOUSANDS/ΜL (ref 0.6–4.47)
LYMPHOCYTES NFR BLD AUTO: 7 % (ref 14–44)
MCH RBC QN AUTO: 27.7 PG (ref 26.8–34.3)
MCHC RBC AUTO-ENTMCNC: 30.3 G/DL (ref 31.4–37.4)
MCV RBC AUTO: 92 FL (ref 82–98)
MONOCYTES # BLD AUTO: 0.72 THOUSAND/ΜL (ref 0.17–1.22)
MONOCYTES NFR BLD AUTO: 6 % (ref 4–12)
MRSA NOSE QL CULT: NORMAL
NEUTROPHILS # BLD AUTO: 10.73 THOUSANDS/ΜL (ref 1.85–7.62)
NEUTS SEG NFR BLD AUTO: 85 % (ref 43–75)
NRBC BLD AUTO-RTO: 0 /100 WBCS
PLATELET # BLD AUTO: 154 THOUSANDS/UL (ref 149–390)
PMV BLD AUTO: 10.4 FL (ref 8.9–12.7)
POTASSIUM SERPL-SCNC: 4.4 MMOL/L (ref 3.5–5.3)
PROCALCITONIN SERPL-MCNC: 1.26 NG/ML
RBC # BLD AUTO: 4.04 MILLION/UL (ref 3.81–5.12)
SODIUM SERPL-SCNC: 135 MMOL/L (ref 136–145)
VANCOMYCIN TROUGH SERPL-MCNC: 18.2 UG/ML (ref 10–20)
WBC # BLD AUTO: 12.57 THOUSAND/UL (ref 4.31–10.16)

## 2021-03-09 PROCEDURE — 97116 GAIT TRAINING THERAPY: CPT

## 2021-03-09 PROCEDURE — 97167 OT EVAL HIGH COMPLEX 60 MIN: CPT

## 2021-03-09 PROCEDURE — 94760 N-INVAS EAR/PLS OXIMETRY 1: CPT

## 2021-03-09 PROCEDURE — 84145 PROCALCITONIN (PCT): CPT | Performed by: NURSE PRACTITIONER

## 2021-03-09 PROCEDURE — 97530 THERAPEUTIC ACTIVITIES: CPT

## 2021-03-09 PROCEDURE — 93970 EXTREMITY STUDY: CPT

## 2021-03-09 PROCEDURE — 73701 CT LOWER EXTREMITY W/DYE: CPT

## 2021-03-09 PROCEDURE — 85025 COMPLETE CBC W/AUTO DIFF WBC: CPT | Performed by: FAMILY MEDICINE

## 2021-03-09 PROCEDURE — 93970 EXTREMITY STUDY: CPT | Performed by: SURGERY

## 2021-03-09 PROCEDURE — 99232 SBSQ HOSP IP/OBS MODERATE 35: CPT | Performed by: FAMILY MEDICINE

## 2021-03-09 PROCEDURE — 80202 ASSAY OF VANCOMYCIN: CPT | Performed by: FAMILY MEDICINE

## 2021-03-09 PROCEDURE — 80048 BASIC METABOLIC PNL TOTAL CA: CPT | Performed by: FAMILY MEDICINE

## 2021-03-09 PROCEDURE — 94660 CPAP INITIATION&MGMT: CPT

## 2021-03-09 RX ADMIN — HEPARIN SODIUM 5000 UNITS: 5000 INJECTION INTRAVENOUS; SUBCUTANEOUS at 06:07

## 2021-03-09 RX ADMIN — PIPERACILLIN AND TAZOBACTAM 3.38 G: 36; 4.5 INJECTION, POWDER, FOR SOLUTION INTRAVENOUS at 23:40

## 2021-03-09 RX ADMIN — VANCOMYCIN HYDROCHLORIDE 1750 MG: 5 INJECTION, POWDER, LYOPHILIZED, FOR SOLUTION INTRAVENOUS at 07:57

## 2021-03-09 RX ADMIN — Medication 2000 UNITS: at 08:00

## 2021-03-09 RX ADMIN — PIPERACILLIN AND TAZOBACTAM 3.38 G: 36; 4.5 INJECTION, POWDER, FOR SOLUTION INTRAVENOUS at 01:32

## 2021-03-09 RX ADMIN — PIPERACILLIN AND TAZOBACTAM 3.38 G: 36; 4.5 INJECTION, POWDER, FOR SOLUTION INTRAVENOUS at 17:19

## 2021-03-09 RX ADMIN — PIPERACILLIN AND TAZOBACTAM 3.38 G: 36; 4.5 INJECTION, POWDER, FOR SOLUTION INTRAVENOUS at 14:05

## 2021-03-09 RX ADMIN — IOHEXOL 100 ML: 350 INJECTION, SOLUTION INTRAVENOUS at 14:00

## 2021-03-09 RX ADMIN — ASPIRIN 81 MG: 81 TABLET, COATED ORAL at 08:00

## 2021-03-09 RX ADMIN — VANCOMYCIN HYDROCHLORIDE 1750 MG: 5 INJECTION, POWDER, LYOPHILIZED, FOR SOLUTION INTRAVENOUS at 18:49

## 2021-03-09 RX ADMIN — LEVOTHYROXINE SODIUM 125 MCG: 125 TABLET ORAL at 06:07

## 2021-03-09 RX ADMIN — HYDROCODONE BITARTRATE AND ACETAMINOPHEN 1 TABLET: 5; 325 TABLET ORAL at 16:48

## 2021-03-09 RX ADMIN — HEPARIN SODIUM 5000 UNITS: 5000 INJECTION INTRAVENOUS; SUBCUTANEOUS at 14:05

## 2021-03-09 RX ADMIN — HEPARIN SODIUM 5000 UNITS: 5000 INJECTION INTRAVENOUS; SUBCUTANEOUS at 21:00

## 2021-03-09 RX ADMIN — FOLIC ACID 1 MG: 1 TABLET ORAL at 08:00

## 2021-03-09 RX ADMIN — FLUOXETINE 20 MG: 20 CAPSULE ORAL at 08:00

## 2021-03-09 RX ADMIN — PIPERACILLIN AND TAZOBACTAM 3.38 G: 36; 4.5 INJECTION, POWDER, FOR SOLUTION INTRAVENOUS at 06:07

## 2021-03-09 RX ADMIN — SPIRONOLACTONE 50 MG: 25 TABLET, FILM COATED ORAL at 08:00

## 2021-03-09 RX ADMIN — BUPROPION 300 MG: 150 TABLET, EXTENDED RELEASE ORAL at 08:00

## 2021-03-09 NOTE — PLAN OF CARE
Problem: OCCUPATIONAL THERAPY ADULT  Goal: Performs self-care activities at highest level of function for planned discharge setting  See evaluation for individualized goals  Description: Treatment Interventions: ADL retraining, Functional transfer training, UE strengthening/ROM, Endurance training, Patient/family training, Equipment evaluation/education, Neuromuscular reeducation, Compensatory technique education, Continued evaluation, Energy conservation, Activityengagement          See flowsheet documentation for full assessment, interventions and recommendations  Note: Limitation: Decreased ADL status, Decreased UE strength, Decreased Safe judgement during ADL, Decreased endurance, Decreased self-care trans, Decreased high-level ADLs  Prognosis: Good  Assessment: Pt is a 63 y/o F admitted to [address] [place] [address] 3/7/2021 d/t experiencing increased redness and pain in lower extremity  Dx: sepsis  Pt with PMHx impacting performance during functional tasks including: lymphedema, renal disorder, morbid obesity  Pt reports living in a 1 story home with 1 CORDELIA  Pt reports completing ADLs, light IADLs, and functional ambulation @ Mod I  Pt has assistance for heavy IADLs and community mobility  Pt has a w/c, scooter, RW and 2 canes at home  Pt reports sponge bathing and washing hair in the sink  On evaluation, Pt A&Ox4  Pt completing UB dressing @ S after set-up  Grooming tasks @ Mod I after set-up  LB Dressing @ Min A  Pt completing STS and SPT @ Min A with use of RW for UB support  Pt BUE ROM and MS WFl (limited ROM I L shoulder although remains functional)  Pt presents with decrease activity tolerance, decrease standing balance, decrease performance during ADL tasks, decrease safety awareness , decrease UB MS, decrease generalized strength, decrease activity engagement and decrease performance during functional transfers   Pt would benefit from continued acute OT services to address deficits as well as HHOT upon d/c from [facility]   Recommendation: ([address] [place] [address]'S [place])  OT Discharge Recommendation: Home with skilled therapy

## 2021-03-09 NOTE — ASSESSMENT & PLAN NOTE
· 1/2 is positive for Gram-positive in clusters could be coagulase negative but could be true staff  She currently on vancomycin ready  Asked Infectious Disease in to evaluation as her left lower extremity cellulitis is actually worsening    Also will wait for final cultures as 1/2 bottles only

## 2021-03-09 NOTE — PLAN OF CARE
Problem: PHYSICAL THERAPY ADULT  Goal: Performs mobility at highest level of function for planned discharge setting  See evaluation for individualized goals  Description: Treatment/Interventions: Functional transfer training, LE strengthening/ROM, Therapeutic exercise, Endurance training, Patient/family training, Equipment eval/education, Bed mobility, Gait training, Compensatory technique education, Spoke to nursing, Spoke to case management, Elevations  Equipment Recommended: (pt has rollator walker)       See flowsheet documentation for full assessment, interventions and recommendations  2/9/4558 [id number] by Ammon Romero PT  Outcome: Progressing  Note: Prognosis: Good  Problem List: Decreased strength, Decreased endurance, Impaired balance, Decreased range of motion, Decreased mobility, Impaired judgement, Decreased safety awareness, Obesity, Decreased skin integrity, Pain  Assessment: Pt tolerated session well  She completed side exit from bed today completing bed mobility with supervision for safety and cues  She was able to achieve standing with decreasing assistance this date and demonstrated increased tolerance to mobility  She was able to ambulate increased distance with decreasing assistance  She requires cues for safety with mobility and cues for controlled descent upon sitting to prevent injury or fall  She requires increased sitting rest between activities  She is limited by decreased strength, balance, endurance and increased pain  She will continue to benefit from PT services to maximize LOF  Barriers to Discharge: None  Barriers to Discharge Comments: decreased activity tolerance, but able to ambulate functional distances  PT Discharge Recommendation: Home with skilled therapy, Return to previous environment with social support(HHPT)     PT - OK to Discharge: (when medically cleared)    See flowsheet documentation for full assessment

## 2021-03-09 NOTE — OCCUPATIONAL THERAPY NOTE
Occupational Therapy Evaluation     Patient Name: Mohit Pascal  PTISP'O Date: 3/9/2021  Problem List  Principal Problem:    Sepsis ([facility] )  Active Problems:    Cellulitis of right lower extremity    Lymphedema    Morbid obesity with BMI of 70 and over, adult ([facility] )    Acquired hypothyroidism    JESSE (obstructive sleep apnea)    PAD (peripheral artery disease) ([facility] )    Past Medical History  Past Medical History:   Diagnosis Date    Disease of thyroid gland     Lymphedema     Morbid obesity due to excess calories ([facility] )     Renal disorder      Past Surgical History  History reviewed  No pertinent surgical history  03/09/21 1115   Note Type   Note type Evaluation   Restrictions/Precautions   Other Precautions Chair Alarm; Bed Alarm; Fall Risk;Pain   Pain Assessment   Pain Assessment Tool 0-10   Pain Score   (0 at rest  6 with movement)   Pain Location/Orientation Orientation: Left; Location: Leg  (back of the leg)   Home Living   Type of Home House  (1 CORDELIA)   Home Layout One level   Bathroom Shower/Tub Tub/shower unit  (sponge bathes, washes hair in sink)   Bathroom Toilet Raised   Bathroom Equipment Grab bars around toilet   [address] [place] [address],[address]; Wheelchair-manual;Cane  (rollator and 2 canes)   Additional Comments Pt reports living in a 1 story home with 1 CORDELIA  ambulates short distances with 2 canes   Prior Function   Level of [place] Independent with ADLs and functional mobility   Lives With Spouse; Son   Ema Delay Help From Family   ADL Assistance Independent   IADLs Independent  (does not drive  uses scooter in comunity )   Falls in the last 6 months 0   Comments Pt reports completing ADLs, IADLs, and functional ambulation in home @ I   Pt has assistance for heavy IADLs and community mobility from family   ADL   Where Assessed Edge of bed   Grooming Assistance 6  Modified Independent   Grooming Deficit Steadying   UB Dressing Assistance 5  Supervision/Setup   UB Dressing Deficit Setup   LB Dressing Assistance 4  Minimal Assistance   LB Dressing Deficit Steadying; Requires assistive device for steadying;Verbal cueing;Supervision/safety; Increased time to complete; Don/doff R sock; Don/doff L sock; Thread RLE into pants; Thread LLE into pants;Pull up over hips   Additional Comments Pt completing ADL tasks while seated at EOB  UB dressing @ S after set-up  Grooming tasks @ Mod after set-up  LB Dressing @ Min A with use of AD PRN  Pt reports "i don't ever wear socks"   Bed Mobility   Supine to Sit 5  Supervision   Additional items Increased time required;Verbal cues   Additional Comments HOB flat, bed rails PRN and increased time   Transfers   Sit to Stand 4  Minimal assistance   Additional items Assist x 1; Increased time required;Verbal cues   Stand to Sit   [facility] Assist)   Additional items Assist x 1; Increased time required;Verbal cues   Stand pivot 4  Minimal assistance   Additional items Assist x 1; Increased time required;Verbal cues   Additional Comments Pt completing functional transfers with use of RW for UB support  Min A for STS and SPT   Balance   Static Sitting Fair +   Dynamic Sitting Fair   Static Standing Fair  (BUE supported)   Dynamic Standing Fair -   Activity Tolerance   Activity Tolerance Patient limited by fatigue   Medical Staff Made Aware Sp[facility] with RN, Philly Romero Assessment   RUE Assessment WFL   LUE Assessment   LUE Assessment WFL  (L shoulder ROM impaired, all other planes WFL)   Hand Function   Gross Motor Coordination Impaired  (L shoulder ROM impaired although functional )   Fine Motor Coordination Functional   Sensation   Light Touch No apparent deficits   Cognition   Overall Cognitive Status WFL   Arousal/Participation Responsive; Alert; Cooperative   Attention Attends with cues to redirect   Orientation Level Oriented X4   Memory Within functional limits   Following Commands Follows one step commands without difficulty   Assessment   Limitation Decreased ADL status; Decreased UE strength;Decreased Safe judgement during ADL;Decreased endurance;Decreased self-care trans;Decreased high-level ADLs   Prognosis Good   Assessment Pt is a 65 y/o F admitted to [address] [place] [address] 3/7/2021 d/t experiencing increased redness and pain in lower extremity  Dx: sepsis  Pt with PMHx impacting performance during functional tasks including: lymphedema, renal disorder, morbid obesity  Pt reports living in a 1 story home with 1 CORDELIA  Pt reports completing ADLs, light IADLs, and functional ambulation @ Mod I  Pt has assistance for heavy IADLs and community mobility  Pt has a w/c, scooter, RW and 2 canes at home  Pt reports sponge bathing and washing hair in the sink  On evaluation, Pt A&Ox4  Pt completing UB dressing @ S after set-up  Grooming tasks @ Mod I after set-up  LB Dressing @ Min A  Pt completing STS and SPT @ Min A with use of RW for UB support  Pt BUE ROM and MS WFl (limited ROM I L shoulder although remains functional)  Pt presents with decrease activity tolerance, decrease standing balance, decrease performance during ADL tasks, decrease safety awareness , decrease UB MS, decrease generalized strength, decrease activity engagement and decrease performance during functional transfers  Pt would benefit from continued acute OT services to address deficits as well as HHOT upon d/c from [address] [place] [address]  Plan   Treatment Interventions ADL retraining;Functional transfer training;UE strengthening/ROM; Endurance training;Patient/family training;Equipment evaluation/education; Neuromuscular reeducation; Compensatory technique education;Continued evaluation; Energy conservation; Activityengagement   Goal Expiration Date 03/19/21   OT Frequency 2-3x/wk   Recommendation   Recommendation   ([address] [place] [address])   OT Discharge Recommendation Home with skilled therapy   AM-PAC Daily Activity Inpatient   Lower Body Dressing 3   Bathing 2   Toileting 3   Upper Body Dressing 3   Grooming 4   Eating 4   Daily Activity Raw Score 19 Daily Activity Standardized Score (Calc for Raw Score >=11) 40 22   AM-PAC Applied Cognition Inpatient   Following a Speech/Presentation 4   Understanding Ordinary Conversation 4   Taking Medications 4   Remembering Where Things Are Placed or Put Away 4   Remembering List of 4-5 Errands 4   Taking Care of Complicated Tasks 4   Applied Cognition Raw Score 24   Applied Cognition Standardized Score 62 21       The patient's raw score on the AM-PAC Daily Activity inpatient short form is 19, standardized score is 40 22, greater than 39 4  Patients at this level are likely to benefit from DC to home  Please refer to the recommendation of the Occupational Therapist for safe DC planning  Pt goals to be met by 3/19/2021    1  Pt will demonstrate ability to complete LB dressing @ S with AD PRN in order to increase safety and independence during meaningful tasks  2  Pt will demonstrate ability to becky/doff socks/shoes while sitting EOB @ S with AD PRN in order to increase safety and independence during meaningful tasks  3  Pt will demonstrate ability to complete toileting tasks including CM and pericare @ S in order to increase safety and independence during meaningful tasks  4  Pt will demonstrate ability to complete EOB, chair, toilet/commode transfers @ S in order to increase performance and participation during functional tasks  5  Pt will demonstrate ability to stand for 5-6 minutes while maintaining G balance with use of RW for UB support PRN  6  Pt will demonstrate ability to tolerate 30-35 minute OT session with no vc'ing for deep breathing or use of energy conservation techniques in order to increase activity tolerance during functional tasks  7  Pt will demonstrate Good carryover of use of energy conservation/compensatory strategies during ADLs and functional tasks in order to increase safety and reduce risk for falls     8  Pt will demonstrate Good attention and participation in continued evaluation of functional ambulation house hold distances in order to assist with safe d/c planning  9  Pt will attend to continued cognitive assessments 100% of the time in order to provide most appropriate d/c recommendations  10  Pt will follow 100% simple 2-step commands and be A&O x4 consistently with environmental cues to increase participation in functional activities  11  Pt will identify 3 areas of interest/hobbies and 1 intervention on how to incorporate into daily life in order to increase interaction with environment and peers as well as increase participation in meaningful tasks  12  Pt will demonstrate 100% carryover of BUE HEP in order to increase BUE MS and increase performance during functional tasks upon d/c home      Celesta Hinds OTR/L

## 2021-03-09 NOTE — ASSESSMENT & PLAN NOTE
Chronic in nature she does have peripheral vascular disease so she does have underlying also venous stasis

## 2021-03-09 NOTE — ASSESSMENT & PLAN NOTE
Low-fat, low-salt diet  Patient already evaluated by Sleep Medicine, and diagnosed with severe sleep apnea, requiring CPAP  As per chart review, patient was referred to Bariatric surgery prior - - -

## 2021-03-09 NOTE — SOCIAL WORK
Current LOS 2 days  CM completed chart review  Pt case discussed with attending Dr Luke Burleson  Pt admitted with Sepsis, Cellulitis of R LE  Pt receiving IV ABX tx  Cultures pending  PT/OT completed evaluations today  Pt is recommended for [facility] at time of d/c      CM met with pt at bedside to discuss recommendations  A post acute care recommendation was made by your care team for [facility]  Discussed Freedom of Choice with patient  List of agencies given to patient via in person  patient aware the list is custom filtered for them by zip code location and that [facility] post acute providers are designated  Pt is requesting referral to [facility]  [address] [place] [address] Drive referral sent  CM will f/u up for acceptance  [facility]  CM will obtain additional choices from pt  CM will continue to follow pt medical course and assist with d/c planning as needed

## 2021-03-09 NOTE — PROGRESS NOTES
[address]  Progress Note - Justice Hendrickson 1960, 64 y o  female MRN: [id number]  Unit/Bed#: -01 Encounter: [id number]  Primary Care Provider: Get Canales DO   Date and time admitted to hospital: 3/7/2021  4:43 PM    Gram-positive cocci bacteremia  Assessment & Plan  · 1/2 is positive for Gram-positive in clusters could be coagulase negative but could be true staff  She currently on vancomycin ready  Asked Infectious Disease in to evaluation as her left lower extremity cellulitis is actually worsening  Also will wait for final cultures as 1/2 bottles only    PAD (peripheral artery disease) ([facility] [address] )  Assessment & Plan  Diffuse disease noted as per arterial duplex  Continue aspirin on outpatient basis    JESSE (obstructive sleep apnea)  Assessment & Plan  Patient does use CPAP at home  Continue CPAP  Follow respiratory protocol    Acquired hypothyroidism  Assessment & Plan  Continue thyroid medication    Morbid obesity with BMI of 70 and over, adult [facility])  Assessment & Plan  Low-fat, low-salt diet  Patient already evaluated by Sleep Medicine, and diagnosed with severe sleep apnea, requiring CPAP  As per chart review, patient was referred to Bariatric surgery prior    Lymphedema  Assessment & Plan  Chronic in nature she does have peripheral vascular disease so she does have underlying also venous stasis  Cellulitis of left lower extremity  Assessment & Plan  · Patient's cellulitis is actually worsening although her procalcitonin and WBC decreased her redness is actually spending a now be needs her thigh and it is very tender    · Has been on antibiotics of Zosyn and vancomycin since admission her MRSA is negative  · Will get a CT of left lower extremity to rule out any abscess  · Ask Infectious Disease in consultation for recommendation of antibiotics if any adjustments are needed  · Patient had the arterial duplex with positive PA D  · Will obtain a venous duplex to rule out any DVT    * Sepsis ([facility] [address] )  Assessment & Plan  Sepsis secondary to left lower extremity cellulitis resolved  VTE Pharmacologic Prophylaxis:   Pharmacologic: Heparin  Mechanical VTE Prophylaxis in Place: Yes    Patient Centered Rounds: I have performed bedside rounds with nursing staff today  Discussions with Specialists or Other Care Team Provider:  Discussed with nursing    Education and Discussions with Family / Patient:  Patient    Time Spent for Care: 30 minutes  More than 50% of total time spent on counseling and coordination of care as described above  Current Length of Stay: 2 day(s)    Current Patient Status: Inpatient   Certification Statement: The patient will continue to require additional inpatient hospital stay due to Left lower extremity cellulitis    Discharge Plan: At least 48 hours pending progress    Code Status: Level 1 - Full Code      Subjective:   Patient seen and examined complaining of left lower extremity pain    Objective:     Vitals:   Temp (24hrs), Av 8 °F (36 6 °C), Min:97 4 °F (36 3 °C), Max:98 2 °F (36 8 °C)    Temp:  [97 4 °F (36 3 °C)-98 2 °F (36 8 °C)] 97 4 °F (36 3 °C)  HR:  [86-89] 89  Resp:  [17] 17  BP: (103-129)/(55-62) 129/62  SpO2:  [93 %-98 %] 98 %  Body mass index is 66 82 kg/m²  Input and Output Summary (last 24 hours): Intake/Output Summary (Last 24 hours) at 3/9/2021 1349  Last data filed at 3/9/2021 4195  Gross per 24 hour   Intake 700 ml   Output 2900 ml   Net -2200 ml       Physical Exam:     Physical Exam  Vitals signs and nursing note reviewed  Constitutional:       General: She is not in acute distress  Appearance: She is well-developed  She is obese  HENT:      Head: Normocephalic and atraumatic  Eyes:      Conjunctiva/sclera: Conjunctivae normal    Neck:      Musculoskeletal: Neck supple  Cardiovascular:      Rate and Rhythm: Normal rate and regular rhythm  Heart sounds: No murmur     Pulmonary:      Effort: Pulmonary effort is normal  No respiratory distress  Breath sounds: Normal breath sounds  Abdominal:      General: Bowel sounds are normal  There is no distension  Palpations: Abdomen is soft  Tenderness: There is no abdominal tenderness  Musculoskeletal:         General: Swelling and tenderness present  Skin:     General: Skin is warm and dry  Findings: Erythema (Left lower extremity all the way from the ankle up to her thighs posterior aspect  She also had does have right lower extremity chronic redness which she does have venous stasis  ) present  Neurological:      Mental Status: She is alert  Additional Data:     Labs:    Results from last 7 days   Lab Units 03/09/21  0550  03/07/21  1721   WBC Thousand/uL 12 57*   < > 32 35*   HEMOGLOBIN g/dL 11 2*   < > 11 8   HEMATOCRIT % 37 0   < > 36 7   PLATELETS Thousands/uL 154   < > 269   BANDS PCT %  --   --  8   NEUTROS PCT % 85*  --   --    LYMPHS PCT % 7*  --   --    LYMPHO PCT %  --   --  1*   MONOS PCT % 6  --   --    MONO PCT %  --   --  5   EOS PCT % 1  --  0    < > = values in this interval not displayed  Results from last 7 days   Lab Units 03/09/21  0550 03/08/21  0515   SODIUM mmol/L 135* 135*   POTASSIUM mmol/L 4 4 3 6   CHLORIDE mmol/L 103 102   CO2 mmol/L 24 28   BUN mg/dL 15 16   CREATININE mg/dL 1 03 1 13   ANION GAP mmol/L 8 5   CALCIUM mg/dL 8 4 8 1*   ALBUMIN g/dL  --  2 3*   TOTAL BILIRUBIN mg/dL  --  1 04*   ALK PHOS U/L  --  83   ALT U/L  --  19   AST U/L  --  21   GLUCOSE RANDOM mg/dL 107 108     Results from last 7 days   Lab Units 03/07/21  1721   INR  1 23*             Results from last 7 days   Lab Units 03/09/21  0550 03/08/21  0515 03/07/21  1721   LACTIC ACID mmol/L  --   --  1 6   PROCALCITONIN ng/ml 1 26* 2 44*  --            * I Have Reviewed All Lab Data Listed Above  * Additional Pertinent Lab Tests Reviewed:  All Labs Within Last 24 Hours Reviewed    Imaging:    Imaging Reports Reviewed Today Include: I am awaiting for CT of the left lower extremity  Imaging Personally Reviewed by Myself Includes:      Recent Cultures (last 7 days):     Results from last 7 days   Lab Units 03/07/21  1722 03/07/21  1721   BLOOD CULTURE   --  No Growth at 24 hrs  GRAM STAIN RESULT  Gram positive cocci in clusters*  --        Last 24 Hours Medication List:   Current Facility-Administered Medications   Medication Dose Route Frequency Provider Last Rate    aspirin  81 mg Oral Daily Amna Peterson MD      buPROPion  300 mg Oral Daily Amna Peterson MD      cholecalciferol  2,000 Units Oral Daily Amna Peterson MD      FLUoxetine  20 mg Oral Daily Amna Peterson MD      folic acid  1 mg Oral Daily Curtis Bustamante MD      heparin (porcine)  5,000 Units Subcutaneous Q8H [address] Curtis Umaña MD      HYDROcodone-acetaminophen  1 tablet Oral Q6H PRN Amna Peterson MD      levothyroxine  125 mcg Oral Early Morning Curtis Bustamante MD      ondansetron  4 mg Intravenous Q4H PRN Amna Peterson MD      piperacillin-tazobactam  3 375 g Intravenous Q6H Mercedes Bustamante MD 3 375 g (03/09/21 1535)    spironolactone  50 mg Oral Daily Amna Peterson MD      vancomycin  17 5 mg/kg (Adjusted) Intravenous Q12H Amna Peterson MD 1,750 mg (03/09/21 8707)        Today, Patient Was Seen By: Marcella Gao MD    ** Please Note: Dictation voice to text software may have been used in the creation of this document   **

## 2021-03-09 NOTE — PHYSICAL THERAPY NOTE
PHYSICAL THERAPY TREATMENT NOTE  NAME:  Vannessa Phan  DATE: 03/09/21    Length Of Stay: 2  Performed at least 2 patient identifiers during session: Name and Birthday    TREATMENT:      03/09/21 1113   PT Last Visit   PT Visit Date 03/09/21   Note Type   Note Type Treatment   Pain Assessment   Pain Assessment Tool 0-10   Pain Score   (no pain at rest, 6/10 with movement)   Pain Location/Orientation Orientation: Left; Location: Leg  (thigh and leg, posterior medial)   Restrictions/Precautions   Other Precautions Chair Alarm; Bed Alarm; Fall Risk;Pain   General   Chart Reviewed Yes   Response to Previous Treatment Other (Comment)  (c/o pain in L LE sitting in recliner chair)   Cognition   Orientation Level Oriented X4   Following Commands Follows one step commands without difficulty   Subjective   Subjective "My leg hurt when I was sitting in that chair " offered bedside chair with arms to trial sitting OOB  pt agrreable to trial  verbalized understanding that chair does not recline or elevate LEs  Bed Mobility   Supine to Sit 5  Supervision   Additional items Increased time required;Verbal cues; Bedrails   Additional Comments HOB flat, use of bedrail  completed bed mobility with supervision with increased time with min cues for sequence and safety  Transfers   Sit to Stand   (minAx1-->stand by assistance)   Additional items Assist x 1; Increased time required;Verbal cues   Stand to Sit   (steadying assistance to stand by assistance)   Additional items Assist x 1;Verbal cues  (cues for controlled descent  )   Stand pivot   (minimal-->steadying assistance)   Additional items Assist x 1; Increased time required;Verbal cues   Additional Comments use of bariatric RW  sit<>stand initially with minAx1 and steadying assistance x1 respectively progressing to stand by assistance with cues for controlled descent upon sitting  spt with RW with minAx1 progressing to steadying assistance  wide GLADYS and decreased stance L LE  Ambulation/Elevation   Gait pattern Narrow GLADYS; Short stride;Decreased foot clearance  (increased sway to right, decreased L stance)   Gait Assistance   (steadying to stand by assistance)   Additional items Assist x 1;Verbal cues   Assistive Device Rolling walker   Distance 24'x2 with RW with steadying assistance 1st trial and steadying to stand by assistance 2nd trial  slow curly with decreased step length, foot clearance, decreased stance L LE and inc right lateral sway  Balance   Static Sitting Fair +   Dynamic Sitting Fair   Static Standing Fair -  (to fair with RW)   Dynamic Standing Fair -   Ambulatory Fair -   Endurance Deficit   Endurance Deficit Yes   Endurance Deficit Description min ALDANA with ambulation  SpO2 stable >/= 93% thorughout on room air  Activity Tolerance   Activity Tolerance Patient limited by fatigue;Patient limited by pain   Medical Staff Made Aware OTRhianna   Nurse Made Aware RNEarnest   Assessment   Prognosis Good   Problem List Decreased strength;Decreased endurance; Impaired balance;Decreased range of motion;Decreased mobility; Impaired judgement;Decreased safety awareness; Obesity; Decreased skin integrity;Pain   Barriers to Discharge None   Barriers to Discharge Comments decreased activity tolerance, but able to ambulate functional distances   Goals   Patient Goals "Get this leg better and go home "   PT Treatment Day 1   Plan   Treatment/Interventions Functional transfer training;LE strengthening/ROM; Therapeutic exercise; Endurance training;Patient/family training;Equipment eval/education; Bed mobility;Gait training; Compensatory technique education;Spoke to nursing;OT;Spoke to case management;Elevations   Progress Slow progress, decreased activity tolerance   PT Frequency Other (Comment)  (3-5x/week)   Recommendation   PT Discharge Recommendation Home with skilled therapy; Return to previous environment with social support  (HHPT)   Equipment Recommended   (walker-pt has)   PT - OK to Discharge   (when medically cleared)   Additional Comments Pt sitting in bedside chair with arms with all needs within reach and posey alarm on  educated to ring for assistance with all mobility  verbalized understanding  AM-PAC Basic Mobility Inpatient   Turning in Bed Without Bedrails 3   Lying on Back to Sitting on Edge of Flat Bed 3   Moving Bed to Chair 3   Standing Up From Chair 3   Walk in Room 3   Climb 3-5 Stairs 3   Basic Mobility Inpatient Raw Score 18   Basic Mobility Standardized Score 41 05       The patient's AM-PAC Basic Mobility Inpatient Short Form Raw Score is 18, Standardized Score is 41 05  A standardized score less than 42 9 suggests the patient may benefit from discharge to post-acute rehabilitation services  Please also refer to the recommendation of the Physical Therapist for safe discharge planning  Anticipate progression of mobility to return to home with HHPT and assistance from family prn  Pt tolerated session well  She completed side exit from bed today completing bed mobility with supervision for safety and cues  She was able to achieve standing with decreasing assistance this date and demonstrated increased tolerance to mobility  She was able to ambulate increased distance with decreasing assistance  She requires cues for safety with mobility and cues for controlled descent upon sitting to prevent injury or fall  She requires increased sitting rest between activities  She is limited by decreased strength, balance, endurance and increased pain  She will continue to benefit from PT services to maximize LOF         Lillian Jimenez, PT,DPT

## 2021-03-09 NOTE — PLAN OF CARE
Problem: Potential for Falls  Goal: Patient will remain free of falls  Description: INTERVENTIONS:  - Assess patient frequently for physical needs  -  Identify cognitive and physical deficits and behaviors that affect risk of falls    -  [place] fall precautions as indicated by assessment   - Educate patient/family on patient safety including physical limitations  - Instruct patient to call for assistance with activity based on assessment  - Modify environment to reduce risk of injury  - Consider OT/PT consult to assist with strengthening/mobility  Outcome: Progressing     Problem: Prexisting or High Potential for Compromised Skin Integrity  Goal: Skin integrity is maintained or improved  Description: INTERVENTIONS:  - Identify patients at risk for skin breakdown  - Assess and monitor skin integrity  - Assess and monitor nutrition and hydration status  - Monitor labs   - Assess for incontinence   - Turn and reposition patient  - Assist with mobility/ambulation  - Relieve pressure over bony prominences  - Avoid friction and shearing  - Provide appropriate hygiene as needed including keeping skin clean and dry  - Evaluate need for skin moisturizer/barrier cream  - Collaborate with interdisciplinary team   - Patient/family teaching  - Consider wound care consult   Outcome: Progressing     Problem: PAIN - ADULT  Goal: Verbalizes/displays adequate comfort level or baseline comfort level  Description: Interventions:  - Encourage patient to monitor pain and request assistance  - Assess pain using appropriate pain scale  - Administer analgesics based on type and severity of pain and evaluate response  - Implement non-pharmacological measures as appropriate and evaluate response  - Consider cultural and social influences on pain and pain management  - Notify physician/advanced practitioner if interventions unsuccessful or patient reports new pain  Outcome: Progressing     Problem: INFECTION - ADULT  Goal: Absence or prevention of progression during hospitalization  Description: INTERVENTIONS:  - Assess and monitor for signs and symptoms of infection  - Monitor lab/diagnostic results  - Monitor all insertion sites, i e  indwelling lines, tubes, and drains  - Monitor endotracheal if appropriate and nasal secretions for changes in amount and color  - [place] appropriate cooling/warming therapies per order  - Administer medications as ordered  - Instruct and encourage patient and family to use good hand hygiene technique  - Identify and instruct in appropriate isolation precautions for identified infection/condition  Outcome: Progressing     Problem: SAFETY ADULT  Goal: Patient will remain free of falls  Description: INTERVENTIONS:  - Assess patient frequently for physical needs  -  Identify cognitive and physical deficits and behaviors that affect risk of falls    -  [place] fall precautions as indicated by assessment   - Educate patient/family on patient safety including physical limitations  - Instruct patient to call for assistance with activity based on assessment  - Modify environment to reduce risk of injury  - Consider OT/PT consult to assist with strengthening/mobility  Outcome: Progressing  Goal: Maintain or return to baseline ADL function  Description: INTERVENTIONS:  -  Assess patient's ability to carry out ADLs; assess patient's baseline for ADL function and identify physical deficits which impact ability to perform ADLs (bathing, care of mouth/teeth, toileting, grooming, dressing, etc )  - Assess/evaluate cause of self-care deficits   - Assess range of motion  - Assess patient's mobility; develop plan if impaired  - Assess patient's need for assistive devices and provide as appropriate  - Encourage maximum independence but intervene and supervise when necessary  - Involve family in performance of ADLs  - Assess for home care needs following discharge   - Consider OT consult to assist with ADL evaluation and planning for discharge  - Provide patient education as appropriate  Outcome: Progressing  Goal: Maintain or return mobility status to optimal level  Description: INTERVENTIONS:  - Assess patient's baseline mobility status (ambulation, transfers, stairs, etc )    - Identify cognitive and physical deficits and behaviors that affect mobility  - Identify mobility aids required to assist with transfers and/or ambulation (gait belt, sit-to-stand, lift, walker, cane, etc )  - [place] fall precautions as indicated by assessment  - Record patient progress and toleration of activity level on Mobility SBAR; progress patient to next Phase/Stage  - Instruct patient to call for assistance with activity based on assessment  - Consider rehabilitation consult to assist with strengthening/weightbearing, etc   Outcome: Progressing     Problem: DISCHARGE PLANNING  Goal: Discharge to home or other facility with appropriate resources  Description: INTERVENTIONS:  - Identify barriers to discharge w/patient and caregiver  - Arrange for needed discharge resources and transportation as appropriate  - Identify discharge learning needs (meds, wound care, etc )  - Arrange for interpretive services to assist at discharge as needed  - Refer to Case Management Department for coordinating discharge planning if the patient needs post-hospital services based on physician/advanced practitioner order or complex needs related to functional status, cognitive ability, or social support system  Outcome: Progressing     Problem: Knowledge Deficit  Goal: Patient/family/caregiver demonstrates understanding of disease process, treatment plan, medications, and discharge instructions  Description: Complete learning assessment and assess knowledge base    Interventions:  - Provide teaching at level of understanding  - Provide teaching via preferred learning methods  Outcome: Progressing     Problem: Nutrition/Hydration-ADULT  Goal: Nutrient/Hydration intake appropriate for improving, restoring or maintaining nutritional needs  Description: Monitor and assess patient's nutrition/hydration status for malnutrition  Collaborate with interdisciplinary team and initiate plan and interventions as ordered  Monitor patient's weight and dietary intake as ordered or per policy  Utilize nutrition screening tool and intervene as necessary  Determine patient's food preferences and provide high-protein, high-caloric foods as appropriate       INTERVENTIONS:  - Monitor oral intake, urinary output, labs, and treatment plans  - Assess nutrition and hydration status and recommend course of action  - Evaluate amount of meals eaten  - Assist patient with eating if necessary   - Allow adequate time for meals  - Recommend/ encourage appropriate diets, oral nutritional supplements, and vitamin/mineral supplements  - Order, calculate, and assess calorie counts as needed  - Recommend, monitor, and adjust tube feedings and TPN/PPN based on assessed needs  - Assess need for intravenous fluids  - Provide specific nutrition/hydration education as appropriate  - Include patient/family/caregiver in decisions related to nutrition  Outcome: Progressing

## 2021-03-09 NOTE — ASSESSMENT & PLAN NOTE
· Patient's cellulitis is actually worsening although her procalcitonin and WBC decreased her redness is actually spending a now be needs her thigh and it is very tender    · Has been on antibiotics of Zosyn and vancomycin since admission her MRSA is negative  · Will get a CT of left lower extremity to rule out any abscess  · Ask Infectious Disease in consultation for recommendation of antibiotics if any adjustments are needed  · Patient had the arterial duplex with positive PA D  · Will obtain a venous duplex to rule out any DVT

## 2021-03-09 NOTE — PLAN OF CARE
Problem: Potential for Falls  Goal: Patient will remain free of falls  Description: INTERVENTIONS:  - Assess patient frequently for physical needs  -  Identify cognitive and physical deficits and behaviors that affect risk of falls    -  [place] fall precautions as indicated by assessment   - Educate patient/family on patient safety including physical limitations  - Instruct patient to call for assistance with activity based on assessment  - Modify environment to reduce risk of injury  - Consider OT/PT consult to assist with strengthening/mobility  Outcome: Progressing     Problem: Prexisting or High Potential for Compromised Skin Integrity  Goal: Skin integrity is maintained or improved  Description: INTERVENTIONS:  - Identify patients at risk for skin breakdown  - Assess and monitor skin integrity  - Assess and monitor nutrition and hydration status  - Monitor labs   - Assess for incontinence   - Turn and reposition patient  - Assist with mobility/ambulation  - Relieve pressure over bony prominences  - Avoid friction and shearing  - Provide appropriate hygiene as needed including keeping skin clean and dry  - Evaluate need for skin moisturizer/barrier cream  - Collaborate with interdisciplinary team   - Patient/family teaching  - Consider wound care consult   Outcome: Progressing     Problem: PAIN - ADULT  Goal: Verbalizes/displays adequate comfort level or baseline comfort level  Description: Interventions:  - Encourage patient to monitor pain and request assistance  - Assess pain using appropriate pain scale  - Administer analgesics based on type and severity of pain and evaluate response  - Implement non-pharmacological measures as appropriate and evaluate response  - Consider cultural and social influences on pain and pain management  - Notify physician/advanced practitioner if interventions unsuccessful or patient reports new pain  Outcome: Progressing     Problem: INFECTION - ADULT  Goal: Absence or prevention of progression during hospitalization  Description: INTERVENTIONS:  - Assess and monitor for signs and symptoms of infection  - Monitor lab/diagnostic results  - Monitor all insertion sites, i e  indwelling lines, tubes, and drains  - Monitor endotracheal if appropriate and nasal secretions for changes in amount and color  - [place] appropriate cooling/warming therapies per order  - Administer medications as ordered  - Instruct and encourage patient and family to use good hand hygiene technique  - Identify and instruct in appropriate isolation precautions for identified infection/condition  Outcome: Progressing  Goal: Absence of fever/infection during neutropenic period  Description: INTERVENTIONS:  - Monitor WBC    Outcome: Progressing     Problem: SAFETY ADULT  Goal: Patient will remain free of falls  Description: INTERVENTIONS:  - Assess patient frequently for physical needs  -  Identify cognitive and physical deficits and behaviors that affect risk of falls    -  [place] fall precautions as indicated by assessment   - Educate patient/family on patient safety including physical limitations  - Instruct patient to call for assistance with activity based on assessment  - Modify environment to reduce risk of injury  - Consider OT/PT consult to assist with strengthening/mobility  Outcome: Progressing  Goal: Maintain or return to baseline ADL function  Description: INTERVENTIONS:  -  Assess patient's ability to carry out ADLs; assess patient's baseline for ADL function and identify physical deficits which impact ability to perform ADLs (bathing, care of mouth/teeth, toileting, grooming, dressing, etc )  - Assess/evaluate cause of self-care deficits   - Assess range of motion  - Assess patient's mobility; develop plan if impaired  - Assess patient's need for assistive devices and provide as appropriate  - Encourage maximum independence but intervene and supervise when necessary  - Involve family in performance of ADLs  - Assess for home care needs following discharge   - Consider OT consult to assist with ADL evaluation and planning for discharge  - Provide patient education as appropriate  Outcome: Progressing  Goal: Maintain or return mobility status to optimal level  Description: INTERVENTIONS:  - Assess patient's baseline mobility status (ambulation, transfers, stairs, etc )    - Identify cognitive and physical deficits and behaviors that affect mobility  - Identify mobility aids required to assist with transfers and/or ambulation (gait belt, sit-to-stand, lift, walker, cane, etc )  - [place] fall precautions as indicated by assessment  - Record patient progress and toleration of activity level on Mobility SBAR; progress patient to next Phase/Stage  - Instruct patient to call for assistance with activity based on assessment  - Consider rehabilitation consult to assist with strengthening/weightbearing, etc   Outcome: Progressing     Problem: DISCHARGE PLANNING  Goal: Discharge to home or other facility with appropriate resources  Description: INTERVENTIONS:  - Identify barriers to discharge w/patient and caregiver  - Arrange for needed discharge resources and transportation as appropriate  - Identify discharge learning needs (meds, wound care, etc )  - Arrange for interpretive services to assist at discharge as needed  - Refer to Case Management Department for coordinating discharge planning if the patient needs post-hospital services based on physician/advanced practitioner order or complex needs related to functional status, cognitive ability, or social support system  Outcome: Progressing     Problem: Knowledge Deficit  Goal: Patient/family/caregiver demonstrates understanding of disease process, treatment plan, medications, and discharge instructions  Description: Complete learning assessment and assess knowledge base    Interventions:  - Provide teaching at level of understanding  - Provide teaching via preferred learning methods  Outcome: Progressing     Problem: Nutrition/Hydration-ADULT  Goal: Nutrient/Hydration intake appropriate for improving, restoring or maintaining nutritional needs  Description: Monitor and assess patient's nutrition/hydration status for malnutrition  Collaborate with interdisciplinary team and initiate plan and interventions as ordered  Monitor patient's weight and dietary intake as ordered or per policy  Utilize nutrition screening tool and intervene as necessary  Determine patient's food preferences and provide high-protein, high-caloric foods as appropriate       INTERVENTIONS:  - Monitor oral intake, urinary output, labs, and treatment plans  - Assess nutrition and hydration status and recommend course of action  - Evaluate amount of meals eaten  - Assist patient with eating if necessary   - Allow adequate time for meals  - Recommend/ encourage appropriate diets, oral nutritional supplements, and vitamin/mineral supplements  - Order, calculate, and assess calorie counts as needed  - Recommend, monitor, and adjust tube feedings and TPN/PPN based on assessed needs  - Assess need for intravenous fluids  - Provide specific nutrition/hydration education as appropriate  - Include patient/family/caregiver in decisions related to nutrition  Outcome: Progressing

## 2021-03-10 LAB
ANION GAP SERPL CALCULATED.3IONS-SCNC: 6 MMOL/L (ref 4–13)
BASOPHILS # BLD AUTO: 0.03 THOUSANDS/ΜL (ref 0–0.1)
BASOPHILS NFR BLD AUTO: 0 % (ref 0–1)
BUN SERPL-MCNC: 15 MG/DL (ref 5–25)
CALCIUM SERPL-MCNC: 8.4 MG/DL (ref 8.3–10.1)
CHLORIDE SERPL-SCNC: 103 MMOL/L (ref 100–108)
CO2 SERPL-SCNC: 28 MMOL/L (ref 21–32)
CREAT SERPL-MCNC: 1.02 MG/DL (ref 0.6–1.3)
EOSINOPHIL # BLD AUTO: 0.25 THOUSAND/ΜL (ref 0–0.61)
EOSINOPHIL NFR BLD AUTO: 2 % (ref 0–6)
ERYTHROCYTE [DISTWIDTH] IN BLOOD BY AUTOMATED COUNT: 12.9 % (ref 11.6–15.1)
GFR SERPL CREATININE-BSD FRML MDRD: 60 ML/MIN/1.73SQ M
GLUCOSE SERPL-MCNC: 104 MG/DL (ref 65–140)
HCT VFR BLD AUTO: 35.5 % (ref 34.8–46.1)
HGB BLD-MCNC: 11.1 G/DL (ref 11.5–15.4)
IMM GRANULOCYTES # BLD AUTO: 0.14 THOUSAND/UL (ref 0–0.2)
IMM GRANULOCYTES NFR BLD AUTO: 1 % (ref 0–2)
LYMPHOCYTES # BLD AUTO: 1.07 THOUSANDS/ΜL (ref 0.6–4.47)
LYMPHOCYTES NFR BLD AUTO: 9 % (ref 14–44)
MCH RBC QN AUTO: 27.5 PG (ref 26.8–34.3)
MCHC RBC AUTO-ENTMCNC: 31.3 G/DL (ref 31.4–37.4)
MCV RBC AUTO: 88 FL (ref 82–98)
MONOCYTES # BLD AUTO: 0.98 THOUSAND/ΜL (ref 0.17–1.22)
MONOCYTES NFR BLD AUTO: 8 % (ref 4–12)
NEUTROPHILS # BLD AUTO: 10.08 THOUSANDS/ΜL (ref 1.85–7.62)
NEUTS SEG NFR BLD AUTO: 80 % (ref 43–75)
NRBC BLD AUTO-RTO: 0 /100 WBCS
PLATELET # BLD AUTO: 211 THOUSANDS/UL (ref 149–390)
PMV BLD AUTO: 10.4 FL (ref 8.9–12.7)
POTASSIUM SERPL-SCNC: 3.6 MMOL/L (ref 3.5–5.3)
RBC # BLD AUTO: 4.04 MILLION/UL (ref 3.81–5.12)
SODIUM SERPL-SCNC: 137 MMOL/L (ref 136–145)
WBC # BLD AUTO: 12.55 THOUSAND/UL (ref 4.31–10.16)

## 2021-03-10 PROCEDURE — G0427 INPT/ED TELECONSULT70: HCPCS | Performed by: INTERNAL MEDICINE

## 2021-03-10 PROCEDURE — 80048 BASIC METABOLIC PNL TOTAL CA: CPT | Performed by: FAMILY MEDICINE

## 2021-03-10 PROCEDURE — 94660 CPAP INITIATION&MGMT: CPT

## 2021-03-10 PROCEDURE — 85025 COMPLETE CBC W/AUTO DIFF WBC: CPT | Performed by: FAMILY MEDICINE

## 2021-03-10 PROCEDURE — 99232 SBSQ HOSP IP/OBS MODERATE 35: CPT | Performed by: FAMILY MEDICINE

## 2021-03-10 PROCEDURE — 87040 BLOOD CULTURE FOR BACTERIA: CPT | Performed by: FAMILY MEDICINE

## 2021-03-10 PROCEDURE — 94760 N-INVAS EAR/PLS OXIMETRY 1: CPT

## 2021-03-10 RX ORDER — CEFTRIAXONE 2 G/50ML
2000 INJECTION, SOLUTION INTRAVENOUS EVERY 24 HOURS
Status: DISCONTINUED | OUTPATIENT
Start: 2021-03-10 | End: 2021-03-12

## 2021-03-10 RX ADMIN — HEPARIN SODIUM 5000 UNITS: 5000 INJECTION INTRAVENOUS; SUBCUTANEOUS at 05:11

## 2021-03-10 RX ADMIN — PIPERACILLIN AND TAZOBACTAM 3.38 G: 36; 4.5 INJECTION, POWDER, FOR SOLUTION INTRAVENOUS at 05:14

## 2021-03-10 RX ADMIN — HEPARIN SODIUM 5000 UNITS: 5000 INJECTION INTRAVENOUS; SUBCUTANEOUS at 15:02

## 2021-03-10 RX ADMIN — CEFTRIAXONE 2000 MG: 2 INJECTION, SOLUTION INTRAVENOUS at 15:03

## 2021-03-10 RX ADMIN — SPIRONOLACTONE 50 MG: 25 TABLET, FILM COATED ORAL at 08:36

## 2021-03-10 RX ADMIN — FLUOXETINE 20 MG: 20 CAPSULE ORAL at 08:36

## 2021-03-10 RX ADMIN — Medication 2000 UNITS: at 08:36

## 2021-03-10 RX ADMIN — FOLIC ACID 1 MG: 1 TABLET ORAL at 08:36

## 2021-03-10 RX ADMIN — ASPIRIN 81 MG: 81 TABLET, COATED ORAL at 08:36

## 2021-03-10 RX ADMIN — PIPERACILLIN AND TAZOBACTAM 3.38 G: 36; 4.5 INJECTION, POWDER, FOR SOLUTION INTRAVENOUS at 11:54

## 2021-03-10 RX ADMIN — BUPROPION 300 MG: 150 TABLET, EXTENDED RELEASE ORAL at 08:36

## 2021-03-10 RX ADMIN — LEVOTHYROXINE SODIUM 125 MCG: 125 TABLET ORAL at 05:11

## 2021-03-10 RX ADMIN — HEPARIN SODIUM 5000 UNITS: 5000 INJECTION INTRAVENOUS; SUBCUTANEOUS at 21:09

## 2021-03-10 RX ADMIN — VANCOMYCIN HYDROCHLORIDE 1750 MG: 5 INJECTION, POWDER, LYOPHILIZED, FOR SOLUTION INTRAVENOUS at 18:41

## 2021-03-10 RX ADMIN — VANCOMYCIN HYDROCHLORIDE 1750 MG: 5 INJECTION, POWDER, LYOPHILIZED, FOR SOLUTION INTRAVENOUS at 06:25

## 2021-03-10 NOTE — TELEMEDICINE
Consultation - Infectious Disease   Eugenio Baptiste 64 y o  female MRN: [id number]  Unit/Bed#: -01 Encounter: [id number]      Inpatient consult to Infectious Diseases  Consult performed by: Vasu Hines MD  Consult ordered by: Son Canales MD          REQUIRED DOCUMENTATION:     1  This service was provided via Telemedicine  2  Provider located at [facility]  3  TeleMed provider: Vasu Hines MD   4  Identify all parties in room with patient during tele consult:RN  5  After connecting through [email or web address], patient was identified by name and date of birth and assistant checked wristband  Patient was then informed that this was a Telemedicine visit and that the exam was being conducted confidentially over secure lines  My office door was closed  No one else was in the room  Patient acknowledged consent and understanding of privacy and security of the Telemedicine visit, and gave us permission to have the assistant stay in the room in order to assist with the history and to conduct the exam   I informed the patient that I have reviewed their record in Epic and presented the opportunity for them to ask any questions regarding the visit today  The patient agreed to participate  Assessment/Recommendations     1  Sepsis, present on admission with tachycardia, leukocytosis  - Source of sepsis is left leg cellulitis  - Clinically improving, afebrile with improving leukocytosis    · Management as below    2   Left lower extremity cellulitis, recurrent, in the setting of morbid obesity, chronic lymphedema  - patient has longstanding superficial open wounds that are likely portal of entry  - CT imaging without discrete abscess  - Initial clinical worsening now with slow improvement  - Suspect strep/staph/possible gram negatives (hx MSSA, Strep, Alcaligenes abscess in the past)     · Continue Vancomycin  · Discontinue pip-tazo and switch to ceftriaxone 2 q24h  · FU blood c/s  · Repeat blood cultures, check MRSA nasal culture  · Serial extremity exams  · Final choice and duration of antibiotics to be determined  · Discussed lower extremity skin care, management of venous edema with oral diuretics, compression stockings, limb elevation  Recommend outpatient fu with lymphedema and wound care clinic  · Recommend weight loss   · Discussed natural history of cellulitis and discussed with patient that he is at risk for recurrent cellulitis/bacteremia if underlying risk factors cannot be modified    3  Peripheral arterial disease, lymphedema, lower extremity wounds  - risk factor for recurrent infection    · Recommend risk-factor modification to prevent recurrent infection as noted above    4  Positive blood culture  - Suspect coagulase negative staph, suspect contaminant    · FU blood c/s, check repeat blood cultures    Thank you for involving me in the care of your patient  Please call with questions, change in clinical status or if tests recommended above are abnormal      Discussed with the primary service  History     Reason for Consult: Recurrent cellulitis  HPI: Miladis Leach is a 64y o  year old female with morbid obesity, chronic lymphedema and recurrent cellulitis of both lower (R more often than left) extremities  She also has a history of polymicrobial (MSSA, Group C strep, Alcaligenes) Ieg abscess in 2019  She has history of chronic lower extremity venous ulcers that had required wound care had healed until about 3 months ago when she noted areas of skin breakdown on both lower extremities  She was treating these wounds at home  One day prior to presentation she felt acutely ill with subjective fever, chills, poor appetite and severe that leg pain, redness and swelling  She presented to the ER on 03/07  In the ER, vitals were notable for tachycardia  Labs were notable for leukocytosis  EKG noted no acute ST changes  She was admitted and started on vancomycin and Zosyn    CT of the lower extremity showed no abscess  Dopplers noted diffuse peripheral arterial disease, no DVT  Blood cultures from admission, single set has grown Gram-positive cocci in clusters  She has clinically improved and is afebrile with improving leukocytosis and improving redness and pain in her left leg  Denies nausea, vomiting, diarrhea or rash and is tolerating antibiotics well  Infectious disease is being consulted for diagnostic work up and antibiotic management  Review of Systems  Pertinent positives and negatives as noted in HPI  Rest complete 12 point system-based review of systems is otherwise negative  PAST MEDICAL HISTORY:  Past Medical History:   Diagnosis Date    Disease of thyroid gland     Lymphedema     Morbid obesity due to excess calories ([address] )     Renal disorder      History reviewed  No pertinent surgical history  FAMILY HISTORY:  Non-contributory    SOCIAL HISTORY:  Social History   /Civil Union  Social History     Substance and Sexual Activity   Alcohol Use Not Currently     Social History     Substance and Sexual Activity   Drug Use Never     Social History     Tobacco Use   Smoking Status Never Smoker   Smokeless Tobacco Never Used       ALLERGIES:  No Known Allergies    MEDICATIONS:  All current active medications have been reviewed      Physical Exam     Temp:  [96 4 °F (35 8 °C)-99 9 °F (37 7 °C)] 96 4 °F (35 8 °C)  HR:  [78-94] 78  Resp:  [17-18] 17  BP: (112-149)/(56-65) 122/62  SpO2:  [93 %-95 %] 95 %  Temp (24hrs), Av 4 °F (36 9 °C), Min:96 4 °F (35 8 °C), Max:99 9 °F (37 7 °C)  Current: Temperature: (!) 96 4 °F (35 8 °C)    Intake/Output Summary (Last 24 hours) at 3/10/2021 1122  Last data filed at 3/10/2021 5985  Gross per 24 hour   Intake 720 ml   Output 800 ml   Net -80 ml       Physical exam findings reported by bedside and primary medical team staff    General Appearance:  Appearing well, nontoxic, and in no distress, appears stated age   Head:  Normocephalic, without obvious abnormality, atraumatic   Eyes:  PERRL, conjunctiva pink and sclera anicteric, both eyes   Nose: Nares normal, mucosa normal, no drainage   Throat: Oropharynx moist without lesions; lips, mucosa, and tongue normal; teeth and gums normal   Neck: Supple, symmetrical, trachea midline, no adenopathy, no tenderness/mass/nodules   Back:   Symmetric, no curvature, ROM normal, no CVA tenderness   Lungs:   Clear to auscultation bilaterally, no audible wheezes, rhonchi and rales, respirations unlabored   Chest Wall:  No tenderness or deformity   Heart:  Regular rate and rhythm, S1, S2 normal, no murmur, rub or gallop   Abdomen:   Soft, non-tender, non-distended, positive bowel sounds, no masses, no organomegaly    No CVA tenderness   Extremities: Extremities normal, atraumatic, no cyanosis, clubbing or edema   Skin: Erythema, warmth noted over left thigh and L>R lower legs with superficial ulcers over both shins   Lymph nodes: Cervical, supraclavicular, and axillary nodes normal   Neurologic: Alert and oriented times 3       Invasive Devices:   Peripheral IV 03/07/21 Left Antecubital (Active)   Site Assessment Dry; Intact 03/09/21 1600   Dressing Type Transparent 03/09/21 1600   Line Status Flushed 03/09/21 1600   Dressing Status Old drainage;Dry; Intact 03/09/21 1600   Dressing Change Due 03/11/21 03/09/21 1600   Reason Not Rotated Not due 03/09/21 1600       Peripheral IV 03/07/21 Right Hand (Active)   Site Assessment Clean;Dry; Intact 03/10/21 0836   Dressing Type Transparent 03/10/21 0836   Line Status Flushed;Saline locked 03/10/21 0836   Dressing Status Clean;Dry; Intact 03/10/21 0836   Dressing Change Due 03/11/21 03/09/21 1600   Reason Not Rotated Not due 03/08/21 1920       External Urinary Catheter (Active)   Collection Container Canister and suction tubing (For Female) 03/09/21 2350   Interventions Removed and skin assessed; Pericare performed;Device changed 03/10/21 0638   Output (mL) 800 mL 03/09/21 2350 Labs, Imaging, & Other Studies     Lab Results:    I have personally reviewed pertinent labs  Results from last 7 days   Lab Units 03/10/21  0556 03/09/21  0550 03/08/21  0613   WBC Thousand/uL 12 55* 12 57* 21 65*   HEMOGLOBIN g/dL 11 1* 11 2* 10 9*   PLATELETS Thousands/uL 211 154 209     Results from last 7 days   Lab Units 03/10/21  0556  03/08/21  0515 03/07/21  1721   POTASSIUM mmol/L 3 6   < > 3 6 4 0   CHLORIDE mmol/L 103   < > 102 100   CO2 mmol/L 28   < > 28 26   BUN mg/dL 15   < > 16 17   CREATININE mg/dL 1 02   < > 1 13 1 11   EGFR ml/min/1 73sq m 60   < > 53 54   CALCIUM mg/dL 8 4   < > 8 1* 8 3   AST U/L  --   --  21 22   ALT U/L  --   --  19 22   ALK PHOS U/L  --   --  83 90    < > = values in this interval not displayed  Results from last 7 days   Lab Units 03/07/21  2150 03/07/21  1722 03/07/21  1721   BLOOD CULTURE   --   --  No Growth at 48 hrs  GRAM STAIN RESULT   --  Gram positive cocci in clusters*  --    MRSA CULTURE ONLY  No Methicillin Resistant Staphlyococcus aureus (MRSA) isolated  --   --        Imaging Studies:   I have personally reviewed pertinent imaging study reports and images in PACS  EKG, Pathology, and Other Studies:   I have personally reviewed pertinent reports  Counseling/Coordination of care: Total 70 minutes communication with the patient via telehealth  Labs, medical tests and imaging studies were independently reviewed by me as noted above in HPI and old records were obtained and summarized as noted above in HPI

## 2021-03-10 NOTE — PROGRESS NOTES
[address]  Progress Note - Dillard Schlatter 1960, 64 y o  female MRN: [id number]  Unit/Bed#: -01 Encounter: [id number]  Primary Care Provider: Santos Rivera DO   Date and time admitted to hospital: 3/7/2021  4:43 PM    Gram-positive cocci bacteremia  Assessment & Plan  · 1/2 is positive for Gram-positive in clusters could be coagulase negative but could be true staff follow up sensitivities  She currently on vancomycin ready  · Discussed with ID  · Repeat bcx    PAD (peripheral artery disease) ([facility])  Assessment & Plan  Diffuse disease noted as per arterial duplex  Continue aspirin on outpatient basis    JESSE (obstructive sleep apnea)  Assessment & Plan  Patient does use CPAP at home  Continue CPAP  Follow respiratory protocol    Acquired hypothyroidism  Assessment & Plan  Continue thyroid medication    Morbid obesity with BMI of 70 and over, adult [facility])  Assessment & Plan  Low-fat, low-salt diet  Patient already evaluated by Sleep Medicine, and diagnosed with severe sleep apnea, requiring CPAP  As per chart review, patient was referred to Bariatric surgery prior    Lymphedema  Assessment & Plan  Chronic in nature she does have peripheral vascular disease so she does have underlying also venous stasis  Cellulitis of left lower extremity  Assessment & Plan  · Finally some improvement notice today of the left lower extremity cellulitis CT of the leg did not show any abscess  I did discuss with Infectious Disease continue vancomycin will discontinue Zosyn placed on Rocephin 2 g IV Q 24 hours final antibiotics will be determined later on and duration  Also filed blood culture  · Has been on antibiotics of Zosyn and vancomycin since admission her MRSA is negative  · Patient had the arterial duplex with positive PA D  · Venous duplex is negative for acute DVT    * Sepsis ([facility] [address] )  Assessment & Plan  Sepsis secondary to left lower extremity cellulitis resolved          VTE Pharmacologic Prophylaxis:   Pharmacologic: Heparin  Mechanical VTE Prophylaxis in Place: Yes    Patient Centered Rounds: I have performed bedside rounds with nursing staff today  Discussions with Specialists or Other Care Team Provider:  Discussed with infectious disease    Education and Discussions with Family / Patient:  Patient    Time Spent for Care: 30 minutes  More than 50% of total time spent on counseling and coordination of care as described above  Current Length of Stay: 3 day(s)    Current Patient Status: Inpatient   Certification Statement: The patient will continue to require additional inpatient hospital stay due to Left lower extremity cellulitis    Discharge Plan:  Anticipate 48 hours    Code Status: Level 1 - Full Code      Subjective:   Patient seen and examined left lower extremity pain is better no chest pain or shortness of breath    Objective:     Vitals:   Temp (24hrs), Av 4 °F (36 9 °C), Min:96 4 °F (35 8 °C), Max:99 9 °F (37 7 °C)    Temp:  [96 4 °F (35 8 °C)-99 9 °F (37 7 °C)] 96 4 °F (35 8 °C)  HR:  [78-94] 78  Resp:  [17-18] 17  BP: (112-149)/(56-65) 122/62  SpO2:  [93 %-95 %] 95 %  Body mass index is 66 kg/m²  Input and Output Summary (last 24 hours): Intake/Output Summary (Last 24 hours) at 3/10/2021 1321  Last data filed at 3/10/2021 1316  Gross per 24 hour   Intake 1080 ml   Output 800 ml   Net 280 ml       Physical Exam:     Physical Exam  Vitals signs and nursing note reviewed  Constitutional:       General: She is not in acute distress  Appearance: She is well-developed  She is obese  HENT:      Head: Normocephalic and atraumatic  Eyes:      Conjunctiva/sclera: Conjunctivae normal    Neck:      Musculoskeletal: Neck supple  Cardiovascular:      Rate and Rhythm: Normal rate and regular rhythm  Heart sounds: No murmur  Pulmonary:      Effort: Pulmonary effort is normal  No respiratory distress  Breath sounds: Normal breath sounds  Abdominal:      Palpations: Abdomen is soft  Tenderness: There is no abdominal tenderness  Musculoskeletal:         General: Swelling present  Skin:     General: Skin is warm and dry  Findings: Erythema (Left lower extremity up to the thigh ) present  Neurological:      Mental Status: She is alert  Additional Data:     Labs:    Results from last 7 days   Lab Units 03/10/21  0556  03/07/21  1721   WBC Thousand/uL 12 55*   < > 32 35*   HEMOGLOBIN g/dL 11 1*   < > 11 8   HEMATOCRIT % 35 5   < > 36 7   PLATELETS Thousands/uL 211   < > 269   BANDS PCT %  --   --  8   NEUTROS PCT % 80*   < >  --    LYMPHS PCT % 9*   < >  --    LYMPHO PCT %  --   --  1*   MONOS PCT % 8   < >  --    MONO PCT %  --   --  5   EOS PCT % 2   < > 0    < > = values in this interval not displayed  Results from last 7 days   Lab Units 03/10/21  0556  03/08/21  0515   SODIUM mmol/L 137   < > 135*   POTASSIUM mmol/L 3 6   < > 3 6   CHLORIDE mmol/L 103   < > 102   CO2 mmol/L 28   < > 28   BUN mg/dL 15   < > 16   CREATININE mg/dL 1 02   < > 1 13   ANION GAP mmol/L 6   < > 5   CALCIUM mg/dL 8 4   < > 8 1*   ALBUMIN g/dL  --   --  2 3*   TOTAL BILIRUBIN mg/dL  --   --  1 04*   ALK PHOS U/L  --   --  83   ALT U/L  --   --  19   AST U/L  --   --  21   GLUCOSE RANDOM mg/dL 104   < > 108    < > = values in this interval not displayed  Results from last 7 days   Lab Units 03/07/21  1721   INR  1 23*             Results from last 7 days   Lab Units 03/09/21  0550 03/08/21  0515 03/07/21  1721   LACTIC ACID mmol/L  --   --  1 6   PROCALCITONIN ng/ml 1 26* 2 44*  --            * I Have Reviewed All Lab Data Listed Above  * Additional Pertinent Lab Tests Reviewed:  All Labs Within Last 24 Hours Reviewed    Imaging:    Imaging Reports Reviewed Today Include: none today  Imaging Personally Reviewed by Myself Includes:      Recent Cultures (last 7 days):     Results from last 7 days   Lab Units 03/07/21  1722 03/07/21  1721 BLOOD CULTURE   --  No Growth at 48 hrs  GRAM STAIN RESULT  Gram positive cocci in clusters*  --        Last 24 Hours Medication List:   Current Facility-Administered Medications   Medication Dose Route Frequency Provider Last Rate    aspirin  81 mg Oral Daily Bruno Lynch MD      buPROPion  300 mg Oral Daily Curtis Bustamante MD      cefTRIAXone  2,000 mg Intravenous Q24H Son Canales MD      cholecalciferol  2,000 Units Oral Daily Burno Lynch MD      FLUoxetine  20 mg Oral Daily Bruno Lynch MD      folic acid  1 mg Oral Daily Bruno Lynch MD      heparin (porcine)  5,000 Units Subcutaneous Q8H [address] Curtis Dennis MD      HYDROcodone-acetaminophen  1 tablet Oral Q6H PRN Bruno Lynch MD      levothyroxine  125 mcg Oral Early Morning Curtis Bustamante MD      ondansetron  4 mg Intravenous Q4H PRN Bruno Lynch MD      spironolactone  50 mg Oral Daily Bruno Lynch MD      vancomycin  17 5 mg/kg (Adjusted) Intravenous Q12H Son Canales MD          Today, Patient Was Seen By: Son Canales MD    ** Please Note: Dictation voice to text software may have been used in the creation of this document   **

## 2021-03-10 NOTE — PROGRESS NOTES
Vancomycin IV Pharmacy-to-Dose Consultation    Rosibel Pa is a 64 y o  female who is currently receiving Vancomycin IV with management by the Pharmacy Consult service  Assessment/Plan:  The patient was reviewed  Renal function is stable and no signs or symptoms of nephrotoxicity and/or infusion reactions were documented in the chart  Based on todays assessment, continue current vancomycin (day # 4) dosing of 1750 mg iv q 12 hrs, with a plan for trough to be drawn at 1830 on 3/11/21  We will continue to follow the patients culture results and clinical progress daily      Tra Bruce, Pharmacist

## 2021-03-10 NOTE — UTILIZATION REVIEW
Notification of Inpatient Admission/Inpatient Authorization Request   This is a Notification of Inpatient Admission for [address]  Be advised that this patient was admitted to our facility under Inpatient Status  Contact Mayte Coleman at [phone] for additional admission information  [facility] DEPT  DEDICATED [phone]  Patient Name:   Carito Dyson   YOB: 1960       [address]   [address]:   [address]  Tax ID: [id number]  NPI: [id number] Attending Provider/NPI:  Phone:  Address: [facility] [[id number]]  [phone]  [facility] FACILITY   Place of Service Code: 24 Place of Service Name:  [address] [place] [address]   Start Date: 3/7/21 1804     Discharge Date & Time: No discharge date for patient encounter  Type of Admission: Inpatient Status Discharge Disposition (if discharged): LTAC Rehab   Patient Diagnoses: Shortness of breath [R06 02]  Left leg cellulitis [N77 810]  Sepsis ([facility] [address] ) [A41 9]     Orders: Admission Orders (From admission, onward)     Ordered        03/07/21 1804  Inpatient Admission  Once                    Assigned Utilization Review Contact: Mayte Coleman  Utilization   Network Utilization Review Department  Phone: [phone]; Fax [phone]  Email: [email or web address]   ATTENTION PAYERS: Please call the assigned Utilization  directly with any questions or concerns ALL voicemails in the department are confidential  Send all requests for admission clinical reviews, approved or denied determinations and any other requests to dedicated fax number belonging to the campus where the patient is receiving treatment

## 2021-03-10 NOTE — ASSESSMENT & PLAN NOTE
· Finally some improvement notice today of the left lower extremity cellulitis CT of the leg did not show any abscess  I did discuss with Infectious Disease continue vancomycin will discontinue Zosyn placed on Rocephin 2 g IV Q 24 hours final antibiotics will be determined later on and duration    Also filed blood culture  · Has been on antibiotics of Zosyn and vancomycin since admission her MRSA is negative  · Patient had the arterial duplex with positive PA D  · Venous duplex is negative for acute DVT

## 2021-03-10 NOTE — ASSESSMENT & PLAN NOTE
· 1/2 is positive for Gram-positive in clusters could be coagulase negative but could be true staff follow up sensitivities  She currently on vancomycin ready    · Discussed with ID  · Repeat bcx

## 2021-03-10 NOTE — PROGRESS NOTES
Vancomycin Assessment    Echo Peng is a 64 y o  female who is currently receiving vancomycin 1750 mg IV every 12 hours for skin-soft tissue infection  Relevant clinical data and objective history reviewed:  Creatinine   Date Value Ref Range Status   03/09/2021 1 03 0 60 - 1 30 mg/dL Final     Comment:     Standardized to IDMS reference method   03/08/2021 1 13 0 60 - 1 30 mg/dL Final     Comment:     Standardized to IDMS reference method   03/07/2021 1 11 0 60 - 1 30 mg/dL Final     Comment:     Standardized to IDMS reference method     /65   Pulse 94   Temp 99 9 °F (37 7 °C)   Resp 17   Ht 5' 3" (1 6 m)   Wt (!) 171 kg (377 lb 3 3 oz)   SpO2 93%   BMI 66 82 kg/m²   I/O last 3 completed shifts: In: 36 [P O :120; IV Piggyback:700]  Out: 3550 [Urine:3550]  Lab Results   Component Value Date/Time    BUN 15 03/09/2021 05:50 AM    WBC 12 57 (H) 03/09/2021 05:50 AM    HGB 11 2 (L) 03/09/2021 05:50 AM    HCT 37 0 03/09/2021 05:50 AM    MCV 92 03/09/2021 05:50 AM     03/09/2021 05:50 AM     Temp Readings from Last 3 Encounters:   03/09/21 99 9 °F (37 7 °C)   06/01/20 97 9 °F (36 6 °C)   01/10/20 98 4 °F (36 9 °C)     Vancomycin Days of Therapy: 3    Assessment/Plan  The patient is currently on vancomycin utilizing scheduled dosing  Baseline risks associated with therapy include: concomitant nephrotoxic medications  The patient is receiving 1750 mg IV every 12 hours with the most recent vancomycin level being at steady-state and therapeutic (18 2) based on a goal of 15-20 (appropriate for most indications) ; therefore, is clinically appropriate and dose will be continued  Pharmacy will continue to follow closely for s/sx of nephrotoxicity, infusion reactions, and appropriateness of therapy  BMP and CBC will be ordered per protocol  Plan for trough as patient approaches steady state, prior to the 4th  dose at approximately 31 75 62 on 3/11/21   Pharmacy will continue to follow the patients culture results and clinical progress daily      Aurelia Keith, Pharmacist

## 2021-03-10 NOTE — PLAN OF CARE
Problem: Potential for Falls  Goal: Patient will remain free of falls  Description: INTERVENTIONS:  - Assess patient frequently for physical needs  -  Identify cognitive and physical deficits and behaviors that affect risk of falls    -  [place] fall precautions as indicated by assessment   - Educate patient/family on patient safety including physical limitations  - Instruct patient to call for assistance with activity based on assessment  - Modify environment to reduce risk of injury  - Consider OT/PT consult to assist with strengthening/mobility  Outcome: Progressing     Problem: Prexisting or High Potential for Compromised Skin Integrity  Goal: Skin integrity is maintained or improved  Description: INTERVENTIONS:  - Identify patients at risk for skin breakdown  - Assess and monitor skin integrity  - Assess and monitor nutrition and hydration status  - Monitor labs   - Assess for incontinence   - Turn and reposition patient  - Assist with mobility/ambulation  - Relieve pressure over bony prominences  - Avoid friction and shearing  - Provide appropriate hygiene as needed including keeping skin clean and dry  - Evaluate need for skin moisturizer/barrier cream  - Collaborate with interdisciplinary team   - Patient/family teaching  - Consider wound care consult   Outcome: Progressing     Problem: PAIN - ADULT  Goal: Verbalizes/displays adequate comfort level or baseline comfort level  Description: Interventions:  - Encourage patient to monitor pain and request assistance  - Assess pain using appropriate pain scale  - Administer analgesics based on type and severity of pain and evaluate response  - Implement non-pharmacological measures as appropriate and evaluate response  - Consider cultural and social influences on pain and pain management  - Notify physician/advanced practitioner if interventions unsuccessful or patient reports new pain  Outcome: Progressing     Problem: INFECTION - ADULT  Goal: Absence or prevention of progression during hospitalization  Description: INTERVENTIONS:  - Assess and monitor for signs and symptoms of infection  - Monitor lab/diagnostic results  - Monitor all insertion sites, i e  indwelling lines, tubes, and drains  - Monitor endotracheal if appropriate and nasal secretions for changes in amount and color  - [place] appropriate cooling/warming therapies per order  - Administer medications as ordered  - Instruct and encourage patient and family to use good hand hygiene technique  - Identify and instruct in appropriate isolation precautions for identified infection/condition  Outcome: Progressing     Problem: SAFETY ADULT  Goal: Patient will remain free of falls  Description: INTERVENTIONS:  - Assess patient frequently for physical needs  -  Identify cognitive and physical deficits and behaviors that affect risk of falls    -  [place] fall precautions as indicated by assessment   - Educate patient/family on patient safety including physical limitations  - Instruct patient to call for assistance with activity based on assessment  - Modify environment to reduce risk of injury  - Consider OT/PT consult to assist with strengthening/mobility  Outcome: Progressing  Goal: Maintain or return to baseline ADL function  Description: INTERVENTIONS:  -  Assess patient's ability to carry out ADLs; assess patient's baseline for ADL function and identify physical deficits which impact ability to perform ADLs (bathing, care of mouth/teeth, toileting, grooming, dressing, etc )  - Assess/evaluate cause of self-care deficits   - Assess range of motion  - Assess patient's mobility; develop plan if impaired  - Assess patient's need for assistive devices and provide as appropriate  - Encourage maximum independence but intervene and supervise when necessary  - Involve family in performance of ADLs  - Assess for home care needs following discharge   - Consider OT consult to assist with ADL evaluation and planning for discharge  - Provide patient education as appropriate  Outcome: Progressing  Goal: Maintain or return mobility status to optimal level  Description: INTERVENTIONS:  - Assess patient's baseline mobility status (ambulation, transfers, stairs, etc )    - Identify cognitive and physical deficits and behaviors that affect mobility  - Identify mobility aids required to assist with transfers and/or ambulation (gait belt, sit-to-stand, lift, walker, cane, etc )  - [place] fall precautions as indicated by assessment  - Record patient progress and toleration of activity level on Mobility SBAR; progress patient to next Phase/Stage  - Instruct patient to call for assistance with activity based on assessment  - Consider rehabilitation consult to assist with strengthening/weightbearing, etc   Outcome: Progressing     Problem: DISCHARGE PLANNING  Goal: Discharge to home or other facility with appropriate resources  Description: INTERVENTIONS:  - Identify barriers to discharge w/patient and caregiver  - Arrange for needed discharge resources and transportation as appropriate  - Identify discharge learning needs (meds, wound care, etc )  - Arrange for interpretive services to assist at discharge as needed  - Refer to Case Management Department for coordinating discharge planning if the patient needs post-hospital services based on physician/advanced practitioner order or complex needs related to functional status, cognitive ability, or social support system  Outcome: Progressing     Problem: Knowledge Deficit  Goal: Patient/family/caregiver demonstrates understanding of disease process, treatment plan, medications, and discharge instructions  Description: Complete learning assessment and assess knowledge base    Interventions:  - Provide teaching at level of understanding  - Provide teaching via preferred learning methods  Outcome: Progressing     Problem: Nutrition/Hydration-ADULT  Goal: Nutrient/Hydration intake appropriate for improving, restoring or maintaining nutritional needs  Description: Monitor and assess patient's nutrition/hydration status for malnutrition  Collaborate with interdisciplinary team and initiate plan and interventions as ordered  Monitor patient's weight and dietary intake as ordered or per policy  Utilize nutrition screening tool and intervene as necessary  Determine patient's food preferences and provide high-protein, high-caloric foods as appropriate       INTERVENTIONS:  - Monitor oral intake, urinary output, labs, and treatment plans  - Assess nutrition and hydration status and recommend course of action  - Evaluate amount of meals eaten  - Assist patient with eating if necessary   - Allow adequate time for meals  - Recommend/ encourage appropriate diets, oral nutritional supplements, and vitamin/mineral supplements  - Order, calculate, and assess calorie counts as needed  - Recommend, monitor, and adjust tube feedings and TPN/PPN based on assessed needs  - Assess need for intravenous fluids  - Provide specific nutrition/hydration education as appropriate  - Include patient/family/caregiver in decisions related to nutrition  Outcome: Progressing

## 2021-03-10 NOTE — SOCIAL WORK
Current LOS 3 days  CM completed chart review  Pt case discussed with attending Dr Luke Burleson  Pt admitted with Sepsis, Cellulitis of R LE  Pt receiving IV ABX tx  Cultures pending  CM f/u with [facility] referral made to [facility] yesterday  Per Tyesha Freeman, they are not able to accept pt at this time d/t not having a nurse available  Additional referral to [facility]  Awaiting response  CM will continue to provide support for pt during medical management of symptoms  CM will continue to follow pt medical course and assist with d/c planning as needed

## 2021-03-11 LAB
ANION GAP SERPL CALCULATED.3IONS-SCNC: 6 MMOL/L (ref 4–13)
BASOPHILS # BLD MANUAL: 0 THOUSAND/UL (ref 0–0.1)
BASOPHILS NFR MAR MANUAL: 0 % (ref 0–1)
BUN SERPL-MCNC: 14 MG/DL (ref 5–25)
CALCIUM SERPL-MCNC: 8.6 MG/DL (ref 8.3–10.1)
CHLORIDE SERPL-SCNC: 103 MMOL/L (ref 100–108)
CO2 SERPL-SCNC: 28 MMOL/L (ref 21–32)
CREAT SERPL-MCNC: 1.02 MG/DL (ref 0.6–1.3)
EOSINOPHIL # BLD MANUAL: 0.5 THOUSAND/UL (ref 0–0.4)
EOSINOPHIL NFR BLD MANUAL: 4 % (ref 0–6)
ERYTHROCYTE [DISTWIDTH] IN BLOOD BY AUTOMATED COUNT: 12.9 % (ref 11.6–15.1)
GFR SERPL CREATININE-BSD FRML MDRD: 60 ML/MIN/1.73SQ M
GLUCOSE SERPL-MCNC: 105 MG/DL (ref 65–140)
HCT VFR BLD AUTO: 36.3 % (ref 34.8–46.1)
HGB BLD-MCNC: 11.3 G/DL (ref 11.5–15.4)
LYMPHOCYTES # BLD AUTO: 17 % (ref 14–44)
LYMPHOCYTES # BLD AUTO: 2.12 THOUSAND/UL (ref 0.6–4.47)
MCH RBC QN AUTO: 27.2 PG (ref 26.8–34.3)
MCHC RBC AUTO-ENTMCNC: 31.1 G/DL (ref 31.4–37.4)
MCV RBC AUTO: 88 FL (ref 82–98)
MONOCYTES # BLD AUTO: 0.5 THOUSAND/UL (ref 0–1.22)
MONOCYTES NFR BLD: 4 % (ref 4–12)
NEUTROPHILS # BLD MANUAL: 9.34 THOUSAND/UL (ref 1.85–7.62)
NEUTS SEG NFR BLD AUTO: 75 % (ref 43–75)
NRBC BLD AUTO-RTO: 0 /100 WBCS
PLATELET # BLD AUTO: 253 THOUSANDS/UL (ref 149–390)
PLATELET BLD QL SMEAR: ADEQUATE
PMV BLD AUTO: 9.9 FL (ref 8.9–12.7)
POLYCHROMASIA BLD QL SMEAR: PRESENT
POTASSIUM SERPL-SCNC: 3.8 MMOL/L (ref 3.5–5.3)
RBC # BLD AUTO: 4.15 MILLION/UL (ref 3.81–5.12)
SODIUM SERPL-SCNC: 137 MMOL/L (ref 136–145)
TOTAL CELLS COUNTED SPEC: 100
VANCOMYCIN TROUGH SERPL-MCNC: 21.4 UG/ML (ref 10–20)
WBC # BLD AUTO: 12.45 THOUSAND/UL (ref 4.31–10.16)

## 2021-03-11 PROCEDURE — 94760 N-INVAS EAR/PLS OXIMETRY 1: CPT

## 2021-03-11 PROCEDURE — 94660 CPAP INITIATION&MGMT: CPT

## 2021-03-11 PROCEDURE — 85007 BL SMEAR W/DIFF WBC COUNT: CPT | Performed by: FAMILY MEDICINE

## 2021-03-11 PROCEDURE — 85027 COMPLETE CBC AUTOMATED: CPT | Performed by: FAMILY MEDICINE

## 2021-03-11 PROCEDURE — 80202 ASSAY OF VANCOMYCIN: CPT | Performed by: FAMILY MEDICINE

## 2021-03-11 PROCEDURE — 99232 SBSQ HOSP IP/OBS MODERATE 35: CPT | Performed by: FAMILY MEDICINE

## 2021-03-11 PROCEDURE — 80048 BASIC METABOLIC PNL TOTAL CA: CPT | Performed by: FAMILY MEDICINE

## 2021-03-11 RX ADMIN — FOLIC ACID 1 MG: 1 TABLET ORAL at 08:07

## 2021-03-11 RX ADMIN — VANCOMYCIN HYDROCHLORIDE 1750 MG: 5 INJECTION, POWDER, LYOPHILIZED, FOR SOLUTION INTRAVENOUS at 06:37

## 2021-03-11 RX ADMIN — HEPARIN SODIUM 5000 UNITS: 5000 INJECTION INTRAVENOUS; SUBCUTANEOUS at 14:08

## 2021-03-11 RX ADMIN — CEFTRIAXONE 2000 MG: 2 INJECTION, SOLUTION INTRAVENOUS at 14:07

## 2021-03-11 RX ADMIN — FLUOXETINE 20 MG: 20 CAPSULE ORAL at 08:08

## 2021-03-11 RX ADMIN — BUPROPION 300 MG: 150 TABLET, EXTENDED RELEASE ORAL at 08:07

## 2021-03-11 RX ADMIN — VANCOMYCIN HYDROCHLORIDE 1750 MG: 5 INJECTION, POWDER, LYOPHILIZED, FOR SOLUTION INTRAVENOUS at 19:05

## 2021-03-11 RX ADMIN — Medication 2000 UNITS: at 08:07

## 2021-03-11 RX ADMIN — HEPARIN SODIUM 5000 UNITS: 5000 INJECTION INTRAVENOUS; SUBCUTANEOUS at 05:36

## 2021-03-11 RX ADMIN — HEPARIN SODIUM 5000 UNITS: 5000 INJECTION INTRAVENOUS; SUBCUTANEOUS at 22:03

## 2021-03-11 RX ADMIN — ASPIRIN 81 MG: 81 TABLET, COATED ORAL at 08:07

## 2021-03-11 RX ADMIN — LEVOTHYROXINE SODIUM 125 MCG: 125 TABLET ORAL at 05:36

## 2021-03-11 RX ADMIN — SPIRONOLACTONE 50 MG: 25 TABLET, FILM COATED ORAL at 08:07

## 2021-03-11 NOTE — PLAN OF CARE
Problem: Potential for Falls  Goal: Patient will remain free of falls  Description: INTERVENTIONS:  - Assess patient frequently for physical needs  -  Identify cognitive and physical deficits and behaviors that affect risk of falls    -  [place] fall precautions as indicated by assessment   - Educate patient/family on patient safety including physical limitations  - Instruct patient to call for assistance with activity based on assessment  - Modify environment to reduce risk of injury  - Consider OT/PT consult to assist with strengthening/mobility  Outcome: Progressing     Problem: Prexisting or High Potential for Compromised Skin Integrity  Goal: Skin integrity is maintained or improved  Description: INTERVENTIONS:  - Identify patients at risk for skin breakdown  - Assess and monitor skin integrity  - Assess and monitor nutrition and hydration status  - Monitor labs   - Assess for incontinence   - Turn and reposition patient  - Assist with mobility/ambulation  - Relieve pressure over bony prominences  - Avoid friction and shearing  - Provide appropriate hygiene as needed including keeping skin clean and dry  - Evaluate need for skin moisturizer/barrier cream  - Collaborate with interdisciplinary team   - Patient/family teaching  - Consider wound care consult   Outcome: Progressing     Problem: PAIN - ADULT  Goal: Verbalizes/displays adequate comfort level or baseline comfort level  Description: Interventions:  - Encourage patient to monitor pain and request assistance  - Assess pain using appropriate pain scale  - Administer analgesics based on type and severity of pain and evaluate response  - Implement non-pharmacological measures as appropriate and evaluate response  - Consider cultural and social influences on pain and pain management  - Notify physician/advanced practitioner if interventions unsuccessful or patient reports new pain  Outcome: Progressing     Problem: INFECTION - ADULT  Goal: Absence or prevention of progression during hospitalization  Description: INTERVENTIONS:  - Assess and monitor for signs and symptoms of infection  - Monitor lab/diagnostic results  - Monitor all insertion sites, i e  indwelling lines, tubes, and drains  - Monitor endotracheal if appropriate and nasal secretions for changes in amount and color  - [place] appropriate cooling/warming therapies per order  - Administer medications as ordered  - Instruct and encourage patient and family to use good hand hygiene technique  - Identify and instruct in appropriate isolation precautions for identified infection/condition  Outcome: Progressing     Problem: SAFETY ADULT  Goal: Patient will remain free of falls  Description: INTERVENTIONS:  - Assess patient frequently for physical needs  -  Identify cognitive and physical deficits and behaviors that affect risk of falls    -  [place] fall precautions as indicated by assessment   - Educate patient/family on patient safety including physical limitations  - Instruct patient to call for assistance with activity based on assessment  - Modify environment to reduce risk of injury  - Consider OT/PT consult to assist with strengthening/mobility  Outcome: Progressing  Goal: Maintain or return to baseline ADL function  Description: INTERVENTIONS:  -  Assess patient's ability to carry out ADLs; assess patient's baseline for ADL function and identify physical deficits which impact ability to perform ADLs (bathing, care of mouth/teeth, toileting, grooming, dressing, etc )  - Assess/evaluate cause of self-care deficits   - Assess range of motion  - Assess patient's mobility; develop plan if impaired  - Assess patient's need for assistive devices and provide as appropriate  - Encourage maximum independence but intervene and supervise when necessary  - Involve family in performance of ADLs  - Assess for home care needs following discharge   - Consider OT consult to assist with ADL evaluation and planning for discharge  - Provide patient education as appropriate  Outcome: Progressing  Goal: Maintain or return mobility status to optimal level  Description: INTERVENTIONS:  - Assess patient's baseline mobility status (ambulation, transfers, stairs, etc )    - Identify cognitive and physical deficits and behaviors that affect mobility  - Identify mobility aids required to assist with transfers and/or ambulation (gait belt, sit-to-stand, lift, walker, cane, etc )  - [place] fall precautions as indicated by assessment  - Record patient progress and toleration of activity level on Mobility SBAR; progress patient to next Phase/Stage  - Instruct patient to call for assistance with activity based on assessment  - Consider rehabilitation consult to assist with strengthening/weightbearing, etc   Outcome: Progressing     Problem: DISCHARGE PLANNING  Goal: Discharge to home or other facility with appropriate resources  Description: INTERVENTIONS:  - Identify barriers to discharge w/patient and caregiver  - Arrange for needed discharge resources and transportation as appropriate  - Identify discharge learning needs (meds, wound care, etc )  - Arrange for interpretive services to assist at discharge as needed  - Refer to Case Management Department for coordinating discharge planning if the patient needs post-hospital services based on physician/advanced practitioner order or complex needs related to functional status, cognitive ability, or social support system  Outcome: Progressing     Problem: Knowledge Deficit  Goal: Patient/family/caregiver demonstrates understanding of disease process, treatment plan, medications, and discharge instructions  Description: Complete learning assessment and assess knowledge base    Interventions:  - Provide teaching at level of understanding  - Provide teaching via preferred learning methods  Outcome: Progressing     Problem: Nutrition/Hydration-ADULT  Goal: Nutrient/Hydration intake appropriate for improving, restoring or maintaining nutritional needs  Description: Monitor and assess patient's nutrition/hydration status for malnutrition  Collaborate with interdisciplinary team and initiate plan and interventions as ordered  Monitor patient's weight and dietary intake as ordered or per policy  Utilize nutrition screening tool and intervene as necessary  Determine patient's food preferences and provide high-protein, high-caloric foods as appropriate       INTERVENTIONS:  - Monitor oral intake, urinary output, labs, and treatment plans  - Assess nutrition and hydration status and recommend course of action  - Evaluate amount of meals eaten  - Assist patient with eating if necessary   - Allow adequate time for meals  - Recommend/ encourage appropriate diets, oral nutritional supplements, and vitamin/mineral supplements  - Order, calculate, and assess calorie counts as needed  - Recommend, monitor, and adjust tube feedings and TPN/PPN based on assessed needs  - Assess need for intravenous fluids  - Provide specific nutrition/hydration education as appropriate  - Include patient/family/caregiver in decisions related to nutrition  Outcome: Progressing

## 2021-03-11 NOTE — ASSESSMENT & PLAN NOTE
· Finally some improvement notice today of the left lower extremity cellulitis CT of the leg did not show any abscess  I did discuss with Infectious Disease continue vancomycin will discontinue Zosyn placed on Rocephin 2 g IV Q 24 hours final antibiotics will be determined later on and duration    Also filed blood culture  · Has been on antibiotics of Zosyn and vancomycin since admission her MRSA is negative  · Patient had the arterial duplex with positive PA D  · Venous duplex is negative for acute DVT  · Left lower extremity cellulitis significantly improving

## 2021-03-11 NOTE — UTILIZATION REVIEW
Continued Stay Review    Date: 3/11                          Current Patient Class: INpatient   Current Level of Care: Med/surg    HPI:61 y o  female initially admitted on 3/7     Assessment/Plan:   Venous duplex neg for DVT  Improvement of LLE cellulitis today  Continue with IV abx  Lymphedema appears chronic in nature as she does have peripheral vascular disease and venous stasis  Await final results of blood cultures  3/10 ID consult: Follow up with C/S  Check MRSA, repeat blood cultures  Source of sepsis is left leg cellulitis  Improving leukocytosis  Pertinent Labs/Diagnostic Results:   3/9 CT LLE:   Diffuse subcutaneous edema with thickening of the overlying skin in the left lower extremity, most pronounced along the medial aspect of the thigh, in keeping with clinically noted cellulitis   Diffuse subcutaneous edema is also noted in the partially   imaged right medial thigh   Findings are in keeping with the clinically noted cellulitis   No localized localized fluid collection or drainable abscess  3/9 VAS lower limb venous: Impression:  RIGHT LOWER LIMB:  No evidence of acute or chronic deep vein thrombosis  No evidence of superficial thrombophlebitis noted  Doppler evaluation shows a normal response to augmentation maneuvers  LEFT LOWER LIMB:  No evidence of acute or chronic deep vein thrombosis  No evidence of superficial thrombophlebitis noted  Doppler evaluation shows a normal response to augmentation maneuvers  3/8 Vascular lower limb arterial:  CONCLUSION:  Impression:  RIGHT LOWER LIMB:  Diffuse disease noted throughout the femoral-popliteal arteries without  significant focal stenosis  LEFT LOWER LIMB:  Diffuse disease noted throughout the femoral-popliteal arteries without  significant focal stenosis  Limited study due to body habitus, lymphedema and low pain tolerance   Unable to  interrogate the left popliteal artery due to pain from cellulitis at the  popliteal fossa; unable to perform JOHNY's due to pain  Results from last 7 days   Lab Units 03/11/21  0602 03/10/21  0556 03/09/21  0550 03/08/21  0613 03/07/21  1721   WBC Thousand/uL 12 45* 12 55* 12 57* 21 65* 32 35*   HEMOGLOBIN g/dL 11 3* 11 1* 11 2* 10 9* 11 8   HEMATOCRIT % 36 3 35 5 37 0 34 6* 36 7   PLATELETS Thousands/uL 253 211 154 209 269   NEUTROS ABS Thousands/µL  --  10 08* 10 73*  --   --    BANDS PCT %  --   --   --   --  8         Results from last 7 days   Lab Units 03/11/21  0602 03/10/21  0556 03/09/21  0550 03/08/21  0515 03/07/21  1721   SODIUM mmol/L 137 137 135* 135* 135*   POTASSIUM mmol/L 3 8 3 6 4 4 3 6 4 0   CHLORIDE mmol/L 103 103 103 102 100   CO2 mmol/L 28 28 24 28 26   ANION GAP mmol/L 6 6 8 5 9   BUN mg/dL 14 15 15 16 17   CREATININE mg/dL 1 02 1 02 1 03 1 13 1 11   EGFR ml/min/1 73sq m 60 60 59 53 54   CALCIUM mg/dL 8 6 8 4 8 4 8 1* 8 3     Results from last 7 days   Lab Units 03/08/21  0515 03/07/21  1721   AST U/L 21 22   ALT U/L 19 22   ALK PHOS U/L 83 90   TOTAL PROTEIN g/dL 6 7 7 6   ALBUMIN g/dL 2 3* 2 8*   TOTAL BILIRUBIN mg/dL 1 04* 0 84         Results from last 7 days   Lab Units 03/11/21  0602 03/10/21  0556 03/09/21  0550 03/08/21  0515 03/07/21  1721   GLUCOSE RANDOM mg/dL 105 104 107 108 123       Results from last 7 days   Lab Units 03/07/21  1721   TROPONIN I ng/mL <0 02         Results from last 7 days   Lab Units 03/07/21  1721   PROTIME seconds 15 2*   INR  1 23*   PTT seconds 43*         Results from last 7 days   Lab Units 03/09/21  0550 03/08/21  0515   PROCALCITONIN ng/ml 1 26* 2 44*     Results from last 7 days   Lab Units 03/07/21  1721   LACTIC ACID mmol/L 1 6       Results from last 7 days   Lab Units 03/07/21  1721   LIPASE u/L 34*         Results from last 7 days   Lab Units 03/10/21  1337 03/07/21  1722 03/07/21  1721   BLOOD CULTURE  Received in Microbiology Lab  Culture in Progress  Received in Microbiology Lab  Culture in Progress   Gram-Positive Cocci Anaerobic (Group)* No Growth at 72 hrs  GRAM STAIN RESULT   --  Gram positive cocci in clusters*  --      Results from last 7 days   Lab Units 03/11/21  0602 03/07/21  1721   TOTAL COUNTED  100 100       Vital Signs:   Date/Time  Temp  Pulse  Resp  BP  MAP (mmHg)  SpO2  O2 Device  O2 Interface Device   03/11/21 0809  --  --  --  --  --  --  None (Room air)  --   03/11/21 06:39:49  98 1 °F (36 7 °C)  85  18  136/81  99  94 %  --  --   03/11/21 06:39:24  98 1 °F (36 7 °C)  85  18  136/81  99  93 %  --  --   03/10/21 2330  --  --  --  --  --  --  --  Face mask   03/10/21 22:32:59  98 9 °F (37 2 °C)  84  --  99/55  70  90 %         Medications:   Scheduled Medications:  aspirin, 81 mg, Oral, Daily  buPROPion, 300 mg, Oral, Daily  cefTRIAXone, 2,000 mg, Intravenous, Q24H  cholecalciferol, 2,000 Units, Oral, Daily  FLUoxetine, 20 mg, Oral, Daily  folic acid, 1 mg, Oral, Daily  heparin (porcine), 5,000 Units, Subcutaneous, Q8H ZOË  levothyroxine, 125 mcg, Oral, Early Morning  spironolactone, 50 mg, Oral, Daily  vancomycin, 17 5 mg/kg (Adjusted), Intravenous, Q12H      Continuous IV Infusions:     PRN Meds:  HYDROcodone-acetaminophen, 1 tablet, Oral, Q6H PRN  ondansetron, 4 mg, Intravenous, Q4H PRN        Discharge Plan: D    Network Utilization Review Department  ATTENTION: Please call with any questions or concerns to [phone] and carefully listen to the prompts so that you are directed to the right person  All voicemails are confidential   Dave Crowell all requests for admission clinical reviews, approved or denied determinations and any other requests to dedicated fax number below belonging to the campus where the patient is receiving treatment   List of dedicated fax numbers for the Facilities:  [address] DENIALS (Administrative/Medical Necessity) [phone]   [address] (Maternity/NICU/Pediatrics) [phone]   5000 [facility] [phone]   8049 [facility] [phone]   [place] [address] [address] [place] [address] [address] [phone]   [place] [id number] [phone]

## 2021-03-11 NOTE — PROGRESS NOTES
[address]  Progress Note - Renny Mitchell 1960, 64 y o  female MRN: [id number]  Unit/Bed#: -01 Encounter: [id number]  Primary Care Provider: Marianna Lehman DO   Date and time admitted to hospital: 3/7/2021  4:43 PM    Gram-positive cocci bacteremia  Assessment & Plan  · 1/2 is positive for Gram-positive in clusters could be coagulase negative but could be true staff follow up sensitivities  She currently on vancomycin ready  · Discussed with ID  · Repeat bcx    PAD (peripheral artery disease) ([facility])  Assessment & Plan  Diffuse disease noted as per arterial duplex  Continue aspirin on outpatient basis    JESSE (obstructive sleep apnea)  Assessment & Plan  Patient does use CPAP at home  Continue CPAP  Follow respiratory protocol    Acquired hypothyroidism  Assessment & Plan  Continue thyroid medication    Morbid obesity with BMI of 70 and over, adult [facility])  Assessment & Plan  Low-fat, low-salt diet  Patient already evaluated by Sleep Medicine, and diagnosed with severe sleep apnea, requiring CPAP  As per chart review, patient was referred to Bariatric surgery prior    Lymphedema  Assessment & Plan  Chronic in nature she does have peripheral vascular disease so she does have underlying also venous stasis  Cellulitis of left lower extremity  Assessment & Plan  · Finally some improvement notice today of the left lower extremity cellulitis CT of the leg did not show any abscess  I did discuss with Infectious Disease continue vancomycin will discontinue Zosyn placed on Rocephin 2 g IV Q 24 hours final antibiotics will be determined later on and duration    Also filed blood culture  · Has been on antibiotics of Zosyn and vancomycin since admission her MRSA is negative  · Patient had the arterial duplex with positive PA D  · Venous duplex is negative for acute DVT  · Left lower extremity cellulitis significantly improving    * Sepsis ([address] )  Assessment & Plan  Sepsis secondary to left lower extremity cellulitis resolved  VTE Pharmacologic Prophylaxis:   Pharmacologic: Heparin  Mechanical VTE Prophylaxis in Place: Yes    Patient Centered Rounds: I have performed bedside rounds with nursing staff today  Discussions with Specialists or Other Care Team Provider:  Discussed with nursing and case management    Education and Discussions with Family / Patient:  Patient    Time Spent for Care: 30 minutes  More than 50% of total time spent on counseling and coordination of care as described above  Current Length of Stay: 4 day(s)    Current Patient Status: Inpatient   Certification Statement: The patient will continue to require additional inpatient hospital stay due to Cellulitis bacteremia awaiting final results of cultures    Discharge Plan:  Awaiting had tomorrow if blood culture results are contaminant    Code Status: Level 1 - Full Code      Subjective:   Patient seen and examined her leg has improved significantly no other complaints    Objective:     Vitals:   Temp (24hrs), Av 3 °F (36 8 °C), Min:97 9 °F (36 6 °C), Max:98 9 °F (37 2 °C)    Temp:  [97 9 °F (36 6 °C)-98 9 °F (37 2 °C)] 98 1 °F (36 7 °C)  HR:  [84-90] 85  Resp:  [18] 18  BP: ()/(55-81) 136/81  SpO2:  [90 %-94 %] 94 %  Body mass index is 70 01 kg/m²  Input and Output Summary (last 24 hours): Intake/Output Summary (Last 24 hours) at 3/11/2021 1143  Last data filed at 3/11/2021 0900  Gross per 24 hour   Intake 660 ml   Output 600 ml   Net 60 ml       Physical Exam:     Physical Exam  Vitals signs and nursing note reviewed  Constitutional:       General: She is not in acute distress  Appearance: She is well-developed  She is obese  HENT:      Head: Normocephalic and atraumatic  Eyes:      Conjunctiva/sclera: Conjunctivae normal    Neck:      Musculoskeletal: Neck supple  Cardiovascular:      Rate and Rhythm: Normal rate and regular rhythm  Heart sounds: No murmur     Pulmonary: Effort: Pulmonary effort is normal  No respiratory distress  Breath sounds: Normal breath sounds  Abdominal:      Palpations: Abdomen is soft  Tenderness: There is no abdominal tenderness  Musculoskeletal:         General: Swelling present  Comments: Lymphedema with redness erythema significantly improving on the left lower extremity   Skin:     General: Skin is warm and dry  Neurological:      General: No focal deficit present  Mental Status: She is alert and oriented to person, place, and time  Psychiatric:         Mood and Affect: Mood normal            Additional Data:     Labs:    Results from last 7 days   Lab Units 03/11/21  0602 03/10/21  0556  03/07/21  1721   WBC Thousand/uL 12 45* 12 55*   < > 32 35*   HEMOGLOBIN g/dL 11 3* 11 1*   < > 11 8   HEMATOCRIT % 36 3 35 5   < > 36 7   PLATELETS Thousands/uL 253 211   < > 269   BANDS PCT %  --   --   --  8   NEUTROS PCT %  --  80*   < >  --    LYMPHS PCT %  --  9*   < >  --    LYMPHO PCT % 17  --   --  1*   MONOS PCT %  --  8   < >  --    MONO PCT % 4  --   --  5   EOS PCT % 4 2   < > 0    < > = values in this interval not displayed  Results from last 7 days   Lab Units 03/11/21  0602  03/08/21  0515   SODIUM mmol/L 137   < > 135*   POTASSIUM mmol/L 3 8   < > 3 6   CHLORIDE mmol/L 103   < > 102   CO2 mmol/L 28   < > 28   BUN mg/dL 14   < > 16   CREATININE mg/dL 1 02   < > 1 13   ANION GAP mmol/L 6   < > 5   CALCIUM mg/dL 8 6   < > 8 1*   ALBUMIN g/dL  --   --  2 3*   TOTAL BILIRUBIN mg/dL  --   --  1 04*   ALK PHOS U/L  --   --  83   ALT U/L  --   --  19   AST U/L  --   --  21   GLUCOSE RANDOM mg/dL 105   < > 108    < > = values in this interval not displayed       Results from last 7 days   Lab Units 03/07/21  1721   INR  1 23*             Results from last 7 days   Lab Units 03/09/21  0550 03/08/21  0515 03/07/21  1721   LACTIC ACID mmol/L  --   --  1 6   PROCALCITONIN ng/ml 1 26* 2 44*  --            * I Have Reviewed All Lab Data Listed Above  * Additional Pertinent Lab Tests Reviewed: All Labs Within Last 24 Hours Reviewed    Imaging:    Imaging Reports Reviewed Today Include: none today  Imaging Personally Reviewed by Myself Includes:      Recent Cultures (last 7 days):     Results from last 7 days   Lab Units 03/10/21  1337 03/07/21  1722 03/07/21  1721   BLOOD CULTURE  Received in Microbiology Lab  Culture in Progress  Received in Microbiology Lab  Culture in Progress  Gram-Positive Cocci Anaerobic (Group)* No Growth at 72 hrs  GRAM STAIN RESULT   --  Gram positive cocci in clusters*  --        Last 24 Hours Medication List:   Current Facility-Administered Medications   Medication Dose Route Frequency Provider Last Rate    aspirin  81 mg Oral Daily Roland Campoverde MD      buPROPion  300 mg Oral Daily Curtis Bustamante MD      cefTRIAXone  2,000 mg Intravenous Q24H Casandra Hoyt MD 2,000 mg (03/10/21 1503)    cholecalciferol  2,000 Units Oral Daily Roland Campoverde MD      FLUoxetine  20 mg Oral Daily Roland Campoverde MD      folic acid  1 mg Oral Daily Curtis Bustamante MD      heparin (porcine)  5,000 Units Subcutaneous Q8H [facility] [place] [facility] Curtis Calvin MD      HYDROcodone-acetaminophen  1 tablet Oral Q6H PRN Roland Campoverde MD      levothyroxine  125 mcg Oral Early Morning Curtis Bustamante MD      ondansetron  4 mg Intravenous Q4H PRN Roland Campoverde MD      spironolactone  50 mg Oral Daily Roland Campoverde MD      vancomycin  17 5 mg/kg (Adjusted) Intravenous Q12H Casandra Hoyt MD 1,750 mg (03/11/21 2122)        Today, Patient Was Seen By: Casandra Hoyt MD    ** Please Note: Dictation voice to text software may have been used in the creation of this document   **

## 2021-03-12 VITALS
BODY MASS INDEX: 51.91 KG/M2 | DIASTOLIC BLOOD PRESSURE: 61 MMHG | SYSTOLIC BLOOD PRESSURE: 113 MMHG | HEART RATE: 83 BPM | RESPIRATION RATE: 18 BRPM | OXYGEN SATURATION: 97 % | WEIGHT: 293 LBS | TEMPERATURE: 98.5 F | HEIGHT: 63 IN

## 2021-03-12 LAB
BACTERIA BLD CULT: ABNORMAL
BACTERIA BLD CULT: NORMAL
GRAM STN SPEC: ABNORMAL

## 2021-03-12 PROCEDURE — 99239 HOSP IP/OBS DSCHRG MGMT >30: CPT | Performed by: FAMILY MEDICINE

## 2021-03-12 PROCEDURE — 94760 N-INVAS EAR/PLS OXIMETRY 1: CPT

## 2021-03-12 RX ORDER — DOXYCYCLINE 100 MG/1
100 CAPSULE ORAL 2 TIMES DAILY
Qty: 10 CAPSULE | Refills: 0 | Status: SHIPPED | OUTPATIENT
Start: 2021-03-12 | End: 2021-03-17

## 2021-03-12 RX ORDER — CEPHALEXIN 500 MG/1
500 CAPSULE ORAL EVERY 6 HOURS SCHEDULED
Qty: 20 CAPSULE | Refills: 0 | Status: SHIPPED | OUTPATIENT
Start: 2021-03-12 | End: 2021-03-17

## 2021-03-12 RX ORDER — ASPIRIN 81 MG/1
81 TABLET ORAL DAILY
Qty: 30 TABLET | Refills: 0 | Status: SHIPPED | OUTPATIENT
Start: 2021-03-13

## 2021-03-12 RX ADMIN — FLUOXETINE 20 MG: 20 CAPSULE ORAL at 08:53

## 2021-03-12 RX ADMIN — Medication 2000 UNITS: at 08:53

## 2021-03-12 RX ADMIN — BUPROPION 300 MG: 150 TABLET, EXTENDED RELEASE ORAL at 08:53

## 2021-03-12 RX ADMIN — HEPARIN SODIUM 5000 UNITS: 5000 INJECTION INTRAVENOUS; SUBCUTANEOUS at 06:29

## 2021-03-12 RX ADMIN — VANCOMYCIN HYDROCHLORIDE 1500 MG: 1 INJECTION, POWDER, LYOPHILIZED, FOR SOLUTION INTRAVENOUS at 06:32

## 2021-03-12 RX ADMIN — ASPIRIN 81 MG: 81 TABLET, COATED ORAL at 08:54

## 2021-03-12 RX ADMIN — LEVOTHYROXINE SODIUM 125 MCG: 125 TABLET ORAL at 06:29

## 2021-03-12 RX ADMIN — FOLIC ACID 1 MG: 1 TABLET ORAL at 08:53

## 2021-03-12 RX ADMIN — SPIRONOLACTONE 50 MG: 25 TABLET, FILM COATED ORAL at 08:56

## 2021-03-12 NOTE — ASSESSMENT & PLAN NOTE
· 1/2 is positive for Gram-positive in clusters could be coagulase negative but could be true staff follow up sensitivities  Discussed with ID today most likely contaminant  · Repeat bcx a negative for 24 hours okay to discharge on p o  Antibiotics for the cellulitis

## 2021-03-12 NOTE — PLAN OF CARE
Problem: Potential for Falls  Goal: Patient will remain free of falls  Description: INTERVENTIONS:  - Assess patient frequently for physical needs  -  Identify cognitive and physical deficits and behaviors that affect risk of falls    -  [place] fall precautions as indicated by assessment   - Educate patient/family on patient safety including physical limitations  - Instruct patient to call for assistance with activity based on assessment  - Modify environment to reduce risk of injury  - Consider OT/PT consult to assist with strengthening/mobility  Outcome: Progressing     Problem: Prexisting or High Potential for Compromised Skin Integrity  Goal: Skin integrity is maintained or improved  Description: INTERVENTIONS:  - Identify patients at risk for skin breakdown  - Assess and monitor skin integrity  - Assess and monitor nutrition and hydration status  - Monitor labs   - Assess for incontinence   - Turn and reposition patient  - Assist with mobility/ambulation  - Relieve pressure over bony prominences  - Avoid friction and shearing  - Provide appropriate hygiene as needed including keeping skin clean and dry  - Evaluate need for skin moisturizer/barrier cream  - Collaborate with interdisciplinary team   - Patient/family teaching  - Consider wound care consult   Outcome: Progressing     Problem: PAIN - ADULT  Goal: Verbalizes/displays adequate comfort level or baseline comfort level  Description: Interventions:  - Encourage patient to monitor pain and request assistance  - Assess pain using appropriate pain scale  - Administer analgesics based on type and severity of pain and evaluate response  - Implement non-pharmacological measures as appropriate and evaluate response  - Consider cultural and social influences on pain and pain management  - Notify physician/advanced practitioner if interventions unsuccessful or patient reports new pain  Outcome: Progressing     Problem: INFECTION - ADULT  Goal: Absence or prevention of progression during hospitalization  Description: INTERVENTIONS:  - Assess and monitor for signs and symptoms of infection  - Monitor lab/diagnostic results  - Monitor all insertion sites, i e  indwelling lines, tubes, and drains  - Monitor endotracheal if appropriate and nasal secretions for changes in amount and color  - [place] appropriate cooling/warming therapies per order  - Administer medications as ordered  - Instruct and encourage patient and family to use good hand hygiene technique  - Identify and instruct in appropriate isolation precautions for identified infection/condition  Outcome: Progressing  Goal: Absence of fever/infection during neutropenic period  Description: INTERVENTIONS:  - Monitor WBC    Outcome: Progressing     Problem: SAFETY ADULT  Goal: Patient will remain free of falls  Description: INTERVENTIONS:  - Assess patient frequently for physical needs  -  Identify cognitive and physical deficits and behaviors that affect risk of falls    -  [place] fall precautions as indicated by assessment   - Educate patient/family on patient safety including physical limitations  - Instruct patient to call for assistance with activity based on assessment  - Modify environment to reduce risk of injury  - Consider OT/PT consult to assist with strengthening/mobility  Outcome: Progressing  Goal: Maintain or return to baseline ADL function  Description: INTERVENTIONS:  -  Assess patient's ability to carry out ADLs; assess patient's baseline for ADL function and identify physical deficits which impact ability to perform ADLs (bathing, care of mouth/teeth, toileting, grooming, dressing, etc )  - Assess/evaluate cause of self-care deficits   - Assess range of motion  - Assess patient's mobility; develop plan if impaired  - Assess patient's need for assistive devices and provide as appropriate  - Encourage maximum independence but intervene and supervise when necessary  - Involve family in performance of ADLs  - Assess for home care needs following discharge   - Consider OT consult to assist with ADL evaluation and planning for discharge  - Provide patient education as appropriate  Outcome: Progressing  Goal: Maintain or return mobility status to optimal level  Description: INTERVENTIONS:  - Assess patient's baseline mobility status (ambulation, transfers, stairs, etc )    - Identify cognitive and physical deficits and behaviors that affect mobility  - Identify mobility aids required to assist with transfers and/or ambulation (gait belt, sit-to-stand, lift, walker, cane, etc )  - [place] fall precautions as indicated by assessment  - Record patient progress and toleration of activity level on Mobility SBAR; progress patient to next Phase/Stage  - Instruct patient to call for assistance with activity based on assessment  - Consider rehabilitation consult to assist with strengthening/weightbearing, etc   Outcome: Progressing     Problem: DISCHARGE PLANNING  Goal: Discharge to home or other facility with appropriate resources  Description: INTERVENTIONS:  - Identify barriers to discharge w/patient and caregiver  - Arrange for needed discharge resources and transportation as appropriate  - Identify discharge learning needs (meds, wound care, etc )  - Arrange for interpretive services to assist at discharge as needed  - Refer to Case Management Department for coordinating discharge planning if the patient needs post-hospital services based on physician/advanced practitioner order or complex needs related to functional status, cognitive ability, or social support system  Outcome: Progressing     Problem: Knowledge Deficit  Goal: Patient/family/caregiver demonstrates understanding of disease process, treatment plan, medications, and discharge instructions  Description: Complete learning assessment and assess knowledge base    Interventions:  - Provide teaching at level of understanding  - Provide teaching via preferred learning methods  Outcome: Progressing     Problem: Nutrition/Hydration-ADULT  Goal: Nutrient/Hydration intake appropriate for improving, restoring or maintaining nutritional needs  Description: Monitor and assess patient's nutrition/hydration status for malnutrition  Collaborate with interdisciplinary team and initiate plan and interventions as ordered  Monitor patient's weight and dietary intake as ordered or per policy  Utilize nutrition screening tool and intervene as necessary  Determine patient's food preferences and provide high-protein, high-caloric foods as appropriate       INTERVENTIONS:  - Monitor oral intake, urinary output, labs, and treatment plans  - Assess nutrition and hydration status and recommend course of action  - Evaluate amount of meals eaten  - Assist patient with eating if necessary   - Allow adequate time for meals  - Recommend/ encourage appropriate diets, oral nutritional supplements, and vitamin/mineral supplements  - Order, calculate, and assess calorie counts as needed  - Recommend, monitor, and adjust tube feedings and TPN/PPN based on assessed needs  - Assess need for intravenous fluids  - Provide specific nutrition/hydration education as appropriate  - Include patient/family/caregiver in decisions related to nutrition  Outcome: Progressing

## 2021-03-12 NOTE — SOCIAL WORK
Current LOS 5 days  CM completed chart review  Pt case discussed with attending Dr Rudy Hansen  Pt admitted with Sepsis, Cellulitis of R LE  Pt receiving IV ABX tx  ID consulted  Pt will switch to PO ABX for 7-10 days  Pt medically stable for d/c      CM scheduled PCP f/u  Pt aware  Information added to AVS      CM updated [facility] of pt d/c today  AVS and DC summary faxed  Information added to AVS      CM confirmed pt has transportation home at time of d/c  CM will continue to follow pt care and remain available until d/c

## 2021-03-12 NOTE — PLAN OF CARE
Problem: Potential for Falls  Goal: Patient will remain free of falls  Description: INTERVENTIONS:  - Assess patient frequently for physical needs  -  Identify cognitive and physical deficits and behaviors that affect risk of falls    -  [place] fall precautions as indicated by assessment   - Educate patient/family on patient safety including physical limitations  - Instruct patient to call for assistance with activity based on assessment  - Modify environment to reduce risk of injury  - Consider OT/PT consult to assist with strengthening/mobility  Outcome: Progressing     Problem: Prexisting or High Potential for Compromised Skin Integrity  Goal: Skin integrity is maintained or improved  Description: INTERVENTIONS:  - Identify patients at risk for skin breakdown  - Assess and monitor skin integrity  - Assess and monitor nutrition and hydration status  - Monitor labs   - Assess for incontinence   - Turn and reposition patient  - Assist with mobility/ambulation  - Relieve pressure over bony prominences  - Avoid friction and shearing  - Provide appropriate hygiene as needed including keeping skin clean and dry  - Evaluate need for skin moisturizer/barrier cream  - Collaborate with interdisciplinary team   - Patient/family teaching  - Consider wound care consult   Outcome: Progressing     Problem: PAIN - ADULT  Goal: Verbalizes/displays adequate comfort level or baseline comfort level  Description: Interventions:  - Encourage patient to monitor pain and request assistance  - Assess pain using appropriate pain scale  - Administer analgesics based on type and severity of pain and evaluate response  - Implement non-pharmacological measures as appropriate and evaluate response  - Consider cultural and social influences on pain and pain management  - Notify physician/advanced practitioner if interventions unsuccessful or patient reports new pain  Outcome: Progressing     Problem: INFECTION - ADULT  Goal: Absence or prevention of progression during hospitalization  Description: INTERVENTIONS:  - Assess and monitor for signs and symptoms of infection  - Monitor lab/diagnostic results  - Monitor all insertion sites, i e  indwelling lines, tubes, and drains  - Monitor endotracheal if appropriate and nasal secretions for changes in amount and color  - [place] appropriate cooling/warming therapies per order  - Administer medications as ordered  - Instruct and encourage patient and family to use good hand hygiene technique  - Identify and instruct in appropriate isolation precautions for identified infection/condition  Outcome: Progressing  Goal: Absence of fever/infection during neutropenic period  Description: INTERVENTIONS:  - Monitor WBC    Outcome: Progressing     Problem: SAFETY ADULT  Goal: Patient will remain free of falls  Description: INTERVENTIONS:  - Assess patient frequently for physical needs  -  Identify cognitive and physical deficits and behaviors that affect risk of falls    -  [place] fall precautions as indicated by assessment   - Educate patient/family on patient safety including physical limitations  - Instruct patient to call for assistance with activity based on assessment  - Modify environment to reduce risk of injury  - Consider OT/PT consult to assist with strengthening/mobility  Outcome: Progressing  Goal: Maintain or return to baseline ADL function  Description: INTERVENTIONS:  -  Assess patient's ability to carry out ADLs; assess patient's baseline for ADL function and identify physical deficits which impact ability to perform ADLs (bathing, care of mouth/teeth, toileting, grooming, dressing, etc )  - Assess/evaluate cause of self-care deficits   - Assess range of motion  - Assess patient's mobility; develop plan if impaired  - Assess patient's need for assistive devices and provide as appropriate  - Encourage maximum independence but intervene and supervise when necessary  - Involve family in performance of ADLs  - Assess for home care needs following discharge   - Consider OT consult to assist with ADL evaluation and planning for discharge  - Provide patient education as appropriate  Outcome: Progressing  Goal: Maintain or return mobility status to optimal level  Description: INTERVENTIONS:  - Assess patient's baseline mobility status (ambulation, transfers, stairs, etc )    - Identify cognitive and physical deficits and behaviors that affect mobility  - Identify mobility aids required to assist with transfers and/or ambulation (gait belt, sit-to-stand, lift, walker, cane, etc )  - [place] fall precautions as indicated by assessment  - Record patient progress and toleration of activity level on Mobility SBAR; progress patient to next Phase/Stage  - Instruct patient to call for assistance with activity based on assessment  - Consider rehabilitation consult to assist with strengthening/weightbearing, etc   Outcome: Progressing     Problem: DISCHARGE PLANNING  Goal: Discharge to home or other facility with appropriate resources  Description: INTERVENTIONS:  - Identify barriers to discharge w/patient and caregiver  - Arrange for needed discharge resources and transportation as appropriate  - Identify discharge learning needs (meds, wound care, etc )  - Arrange for interpretive services to assist at discharge as needed  - Refer to Case Management Department for coordinating discharge planning if the patient needs post-hospital services based on physician/advanced practitioner order or complex needs related to functional status, cognitive ability, or social support system  Outcome: Progressing     Problem: Knowledge Deficit  Goal: Patient/family/caregiver demonstrates understanding of disease process, treatment plan, medications, and discharge instructions  Description: Complete learning assessment and assess knowledge base    Interventions:  - Provide teaching at level of understanding  - Provide teaching via preferred learning methods  Outcome: Progressing     Problem: Nutrition/Hydration-ADULT  Goal: Nutrient/Hydration intake appropriate for improving, restoring or maintaining nutritional needs  Description: Monitor and assess patient's nutrition/hydration status for malnutrition  Collaborate with interdisciplinary team and initiate plan and interventions as ordered  Monitor patient's weight and dietary intake as ordered or per policy  Utilize nutrition screening tool and intervene as necessary  Determine patient's food preferences and provide high-protein, high-caloric foods as appropriate       INTERVENTIONS:  - Monitor oral intake, urinary output, labs, and treatment plans  - Assess nutrition and hydration status and recommend course of action  - Evaluate amount of meals eaten  - Assist patient with eating if necessary   - Allow adequate time for meals  - Recommend/ encourage appropriate diets, oral nutritional supplements, and vitamin/mineral supplements  - Order, calculate, and assess calorie counts as needed  - Recommend, monitor, and adjust tube feedings and TPN/PPN based on assessed needs  - Assess need for intravenous fluids  - Provide specific nutrition/hydration education as appropriate  - Include patient/family/caregiver in decisions related to nutrition  Outcome: Progressing

## 2021-03-12 NOTE — PROGRESS NOTES
Vancomycin Assessment    Marine Marcelino is a 64 y o  female who is currently receiving vancomycin 1750 mg IV q 12 hrs for skin-soft tissue infection     Relevant clinical data and objective history reviewed:  Creatinine   Date Value Ref Range Status   03/11/2021 1 02 0 60 - 1 30 mg/dL Final     Comment:     Standardized to IDMS reference method   03/10/2021 1 02 0 60 - 1 30 mg/dL Final     Comment:     Standardized to IDMS reference method   03/09/2021 1 03 0 60 - 1 30 mg/dL Final     Comment:     Standardized to IDMS reference method     /80   Pulse 85   Temp 97 9 °F (36 6 °C)   Resp 18   Ht 5' 3" (1 6 m)   Wt (!) 179 kg (395 lb 3 2 oz)   SpO2 96%   BMI 70 01 kg/m²   I/O last 3 completed shifts: In: 840 [P O :840]  Out: 600 [Urine:600]  Lab Results   Component Value Date/Time    BUN 14 03/11/2021 06:02 AM    WBC 12 45 (H) 03/11/2021 06:02 AM    HGB 11 3 (L) 03/11/2021 06:02 AM    HCT 36 3 03/11/2021 06:02 AM    MCV 88 03/11/2021 06:02 AM     03/11/2021 06:02 AM     Temp Readings from Last 3 Encounters:   03/11/21 97 9 °F (36 6 °C)   06/01/20 97 9 °F (36 6 °C)   01/10/20 98 4 °F (36 9 °C)     Vancomycin Days of Therapy: 5    Assessment/Plan  The patient is currently on vancomycin utilizing pulse dosing  Baseline risks associated with therapy include: pre-existing renal impairment, concomitant nephrotoxic medications, advanced age, and dehydration  The patient is receiving 1750 mg IV q 12 hrs with the most recent vancomycin level being at steady-state and supratherapeutic based on a goal of 15-20 (appropriate for most indications) ; therefore, after clinical evaluation will be changed to 1500 mg IV q 12 hrs   Pharmacy will continue to follow closely for s/sx of nephrotoxicity, infusion reactions, and appropriateness of therapy  BMP and CBC will be ordered per protocol  Plan for trough as patient approaches steady state, prior to the 4th  dose at approximately 1830 on 3/13/21   Pharmacy will continue to follow the patients culture results and clinical progress daily      Jose Manuel Bell, Pharmacist

## 2021-03-12 NOTE — RESPIRATORY THERAPY NOTE
RT Ventilator Management Note  Mohit Naida 64 y o  female MRN: [id number]  Unit/Bed#: -01 Encounter: [id number]      Daily Screen     No data found in the last 10 encounters  Physical Exam:   Subjective Data: Patient wore CPAP for hours of sleep at 2200  Mask was replaced a ssafety clips were misplaced    Patient remainded stable throughout the night, tolerating CPAP well      Resp Comments: off CPAP

## 2021-03-12 NOTE — DISCHARGE SUMMARY
[address]  Discharge- Zully [place] 1960, 64 y o  female MRN: [id number]  Unit/Bed#: -01 Encounter: [id number]  Primary Care Provider: Franki Tierney DO   Date and time admitted to hospital: 3/7/2021  4:43 PM    Gram-positive cocci bacteremia  Assessment & Plan  · 1/2 is positive for Gram-positive in clusters could be coagulase negative but could be true staff follow up sensitivities  Discussed with ID today most likely contaminant  · Repeat bcx a negative for 24 hours okay to discharge on p o  Antibiotics for the cellulitis  PAD (peripheral artery disease) ([facility])  Assessment & Plan  Diffuse disease noted as per arterial duplex  Continue aspirin on outpatient basis    JESSE (obstructive sleep apnea)  Assessment & Plan  Patient does use CPAP at home  Continue CPAP  Follow respiratory protocol    Acquired hypothyroidism  Assessment & Plan  Continue thyroid medication    Morbid obesity with BMI of 70 and over, adult [facility])  Assessment & Plan  Low-fat, low-salt diet  Patient already evaluated by Sleep Medicine, and diagnosed with severe sleep apnea, requiring CPAP  As per chart review, patient was referred to Bariatric surgery prior    Lymphedema  Assessment & Plan  Chronic in nature she does have peripheral vascular disease so she does have underlying also venous stasis  Cellulitis of left lower extremity  Assessment & Plan  · Finally some improvement notice today of the left lower extremity cellulitis CT of the leg did not show any abscess  · MRSA is negative  · Patient had the arterial duplex with positive PA D  · Venous duplex is negative for acute DVT  · Left lower extremity cellulitis significantly improving  · Discharge with Infectious Disease will discharge on doxycycline 100 mg p o  B i d  X5 more days of Keflex 500 mg p o  Q 6 hours x5 days    * Sepsis ([address] )  Assessment & Plan  Sepsis secondary to left lower extremity cellulitis resolved          Discharging Physician / Practitioner: Ryley Llanes MD  PCP: Elaine Gresham DO  Admission Date:   Admission Orders (From admission, onward)     Ordered        03/07/21 1804  Inpatient Admission  Once                   Discharge Date: 03/12/21    Resolved Problems  Date Reviewed: 3/12/2021    None          Consultations During Hospital Stay:  · Infectious disease    Procedures Performed:   · None    Significant Findings / Test Results:   · Arterial duplex shows evidence of peripheral vascular disease-   · CT lower extremity with contrast left evidence of cellulitis but no abscess  · Venous duplex is negative for acute DVT    Incidental Findings:   · None     Test Results Pending at Discharge (will require follow up): · None     Outpatient Tests Requested:  · None    Complications:  None    Reason for Admission:     Hospital Course:     Blaze Lozoya is a 64 y o  female patient who originally presented to the hospital on 3/7/2021 due to cellulitis of the left lower extremity  Initially placed on Zosyn and vancomycin no improvement 48 hours I did do a CT of the leg without any abscess or deeper infection I did ask Infectious Disease to evaluate antibiotics were adjusted and she had negative DVT just on the arterial duplex peripheral vascular disease  Her cellulitis finally started to improve she did have 1/2 blood cultures positive for Gram-positive in clusters discussed with infectious disease repeat a negative for 24 hours suspect this is contamination patient is medically clear to be discharged on final antibiotics of doxycycline 100 mg p o  B i d  x5 more days and Keflex 500 mg p o  Q 6 hours for 5 more days it was discussed with her the importance of hygiene  Follow-up with primary care physician 5-7 days post discharge  Please see above list of diagnoses and related plan for additional information       Condition at Discharge: stable     Discharge Day Visit / Exam:     Subjective:  Patient seen and examined she feels great wants to go home  Vitals: Blood Pressure: 113/61 (03/12/21 0854)  Pulse: 83 (03/12/21 0854)  Temperature: 98 5 °F (36 9 °C) (03/11/21 2237)  Temp Source: Axillary (03/11/21 2237)  Respirations: 18 (03/11/21 2237)  Height: 5' 3" (160 cm) (03/07/21 1655)  Weight - Scale: (!) 180 kg (395 lb 12 8 oz) (03/12/21 0600)  SpO2: 97 % (03/12/21 0854)  Exam:   Physical Exam  Vitals signs and nursing note reviewed  Constitutional:       General: She is not in acute distress  Appearance: She is well-developed  She is obese  HENT:      Head: Normocephalic and atraumatic  Eyes:      Conjunctiva/sclera: Conjunctivae normal    Neck:      Musculoskeletal: Neck supple  Cardiovascular:      Rate and Rhythm: Normal rate and regular rhythm  Heart sounds: No murmur  Pulmonary:      Effort: Pulmonary effort is normal  No respiratory distress  Breath sounds: Normal breath sounds  Abdominal:      Palpations: Abdomen is soft  Tenderness: There is no abdominal tenderness  Musculoskeletal:         General: Swelling present  Comments: Left lower extremity redness and cellulitis is significantly improving   Skin:     General: Skin is warm and dry  Neurological:      General: No focal deficit present  Mental Status: She is alert and oriented to person, place, and time  Mental status is at baseline  Psychiatric:         Mood and Affect: Mood normal          Discussion with Family: no    Discharge instructions/Information to patient and family:   See after visit summary for information provided to patient and family  Provisions for Follow-Up Care:  See after visit summary for information related to follow-up care and any pertinent home health orders        Disposition:     Home with VNA Services (Reminder: Complete face to face encounter)    For Discharges to [facility]:   · Not Applicable to this Patient - Not Applicable to this Patient    Planned Readmission: no Discharge Statement:  I spent >35 minutes discharging the patient  This time was spent on the day of discharge  I had direct contact with the patient on the day of discharge  Greater than 50% of the total time was spent examining patient, answering all patient questions, arranging and discussing plan of care with patient as well as directly providing post-discharge instructions  Additional time then spent on discharge activities  Discharge Medications:  See after visit summary for reconciled discharge medications provided to patient and family        ** Please Note: This note has been constructed using a voice recognition system **

## 2021-03-12 NOTE — PROGRESS NOTES
Vancomycin IV Pharmacy-to-Dose Consultation    Roman Granados is a 64 y o  female who is currently receiving Vancomycin IV with management by the Pharmacy Consult service  Assessment/Plan:  The patient was reviewed  Renal function is stable and no signs or symptoms of nephrotoxicity and/or infusion reactions were documented in the chart  Based on todays assessment, continue current vancomycin (day # 6) dosing of 1500 mg iv q 12 hrs, with a plan for trough to be drawn at 1830  on 3/13/21  We will continue to follow the patients culture results and clinical progress daily      Stephanie Davis, Pharmacist

## 2021-03-12 NOTE — ASSESSMENT & PLAN NOTE
· Finally some improvement notice today of the left lower extremity cellulitis CT of the leg did not show any abscess     · MRSA is negative  · Patient had the arterial duplex with positive PA D  · Venous duplex is negative for acute DVT  · Left lower extremity cellulitis significantly improving  · Discharge with Infectious Disease will discharge on doxycycline 100 mg p o  B i d  X5 more days of Keflex 500 mg p o  Q 6 hours x5 days

## 2021-03-12 NOTE — CONSULTS
The patient's Vancomycin therapy has been completed  Thank you for this consult  Pharmacy will sign-off now

## 2021-03-15 LAB
BACTERIA BLD CULT: NORMAL
BACTERIA BLD CULT: NORMAL

## 2021-03-18 NOTE — UTILIZATION REVIEW
Notification of Discharge  This is a Notification of Discharge from our facility [address]  Please be advised that this patient has been discharge from our facility  Below you will find the admission and discharge date and time including the patients disposition  PRESENTATION DATE: 3/7/2021  4:43 PM  OBS ADMISSION DATE:   IP ADMISSION DATE: 3/7/21 1804   DISCHARGE DATE: 3/12/2021  1:04 PM  DIS[facility]ITION[facility] [address] [facility]   Admission Orders listed below:  Admission Orders (From admission, onward)     Ordered        03/07/21 1804  Inpatient Admission  Once                   Please contact the UR Department if additional information is required to close this patient's authorization/case  [address] Utilization Review Department  Main: [phone] x carefully listen to the prompts  All voicemails are confidential   [email or web address]  Send all requests for admission clinical reviews, approved or denied determinations and any other requests to dedicated fax number below belonging to the campus where the patient is receiving treatment   List of dedicated fax numbers:  [address] DENIALS (Administrative/Medical Necessity) [phone]   [address] (Maternity/NICU/Pediatrics) [phone]   Aundra Small [phone]   [facility] [phone]   Marvin Loud [phone]   [address] [phone]   [facility]  [phone]   [address] [facility] [address]  [address] [address] [address] [facility] [phone]

## 2021-06-02 NOTE — PHYSICAL THERAPY NOTE
PHYSICAL THERAPY TREATMENT NOTE  NAME:  Jd Nobles  DATE: 01/08/20    Length Of Stay: 11  Performed at least 2 patient identifiers during session: Name and Birthday    TREATMENT:      01/08/20 0948   Pain Assessment   Pain Assessment 0-10   Pain Score No Pain   Restrictions/Precautions   Other Precautions Chair Alarm; Bed Alarm;O2;Fall Risk   General   Chart Reviewed Yes   Response to Previous Treatment Patient with no complaints from previous session  Cognition   Orientation Level Oriented X4   Subjective   Subjective "I think I can do better with my cane "   Bed Mobility   Rolling R 4  Minimal assistance   Additional items Assist x 1; Increased time required;Verbal cues;LE management  (VCs for hand placement)   Rolling L 3  Moderate assistance   Additional items Assist x 1; Increased time required;LE management   Supine to Sit 3  Moderate assistance   Additional items Assist x 2; Increased time required;LE management;Verbal cues  (trunk management, cues for tehcnique, sequence)   Sit to Supine 2  Maximal assistance   Additional items Assist x 2;LE management; Increased time required  (trunk management)   Additional Comments HOB elevated 15 degrees  completed bed mobility with cues for technique and increased time   Transfers   Sit to Stand   (maxAx2 just barely cleared buttock; side exit, front maxAx4 )   Additional items Increased time required;Verbal cues  (VCs for technique, sequence)   Additional Comments completed Marilyn's Egress test at EOB via side exit with pt just clearing buttock from EOB with maxAx2  unable to achieve further standing with transfer trial from side of bed  returned to supine  patient then positioned in chair position to trial transfers via foot exit  Marilyn's egress test completed with patient using bedrails to facilitate transfer  able to clear buttock with maxAx4  attempted sit<>stand 3x with B knees and feet blocked with B hands on RW and cues for anterior weight shift   able to clear buttock with maxAx4, but significant hip and turnk flexion  unable to achieve upright posture despite maxAx4  Balance   Static Sitting Fair +   Dynamic Sitting Fair -   Endurance Deficit   Endurance Deficit   (SpO2 desaturated to 88% with bed mobility, inc to 98% 2')   Activity Tolerance   Activity Tolerance Patient limited by fatigue   Medical Staff Made Aware Roselyn DE LEÓN, [facility]   Nurse Made Aware RNLuh   Assessment   Prognosis Fair   Problem List Decreased strength;Decreased range of motion;Decreased endurance; Impaired balance;Decreased mobility; Impaired judgement;Decreased safety awareness; Obesity; Decreased skin integrity   Goals   Patient Goals "See my skunk "   PT Treatment Day 3   Plan   Treatment/Interventions Functional transfer training;LE strengthening/ROM; Therapeutic exercise; Endurance training;Patient/family training;Equipment eval/education; Bed mobility;Gait training;Spoke to nursing;OT;Spoke to case management; Family   Progress Slow progress, decreased activity tolerance   PT Frequency Other (Comment)  (3-5x/week)   Recommendation   Recommendation Short-term skilled PT   Equipment Recommended Wheelchair;Walker   PT - OK to Discharge Yes  (when medically cleared)     Pt tolerated session fairly  She tolerated increased transfer trials and was able to just barely clear buttock from EOB via side exit and fully clear buttock via foot exit  She is able to scoot forward via foot exit with use of bedrails and UEs, but requires assistance of 2 people each side to scoot backward  She continues to require increased assistance with bed mobility due to decreased strength, balance and endurance  She was more participative this date with transfer trials being able to clear buttock  She desaturated to 88% 1x with bed mobility, then remained form 93-98% on 2 lpm throughout  She will continue to benefit from PT services to increase mobility as able to facilitate standing and progression to ambulation  Continue to recommend STR to maximize LOF       Charles Liang, PT,DPT stated

## 2021-06-04 NOTE — RESPIRATORY THERAPY NOTE
Patient placed on nocturnal pulsox at 2100 on room air per physician order  Nurse was notified as patient was previously on 4L nasal cannula throughout the day  At @ 2145 nurse paged respiratory to patient's room as SpO2 was in 60% range  4L nasal cannula was immediately replaced on patient  Patient's SpO2 retuend to 94% with 1 minute  Patient's heart rate never climbed above 97 while therapist wa sin her room    RICK was then notified of situation and agreed with replacing nasal cannula on patient [place] Pregnancy And Lactation Text: This medication is not safe during pregnancy. It is unknown if this medication is excreted in breast milk.

## 2021-12-21 ENCOUNTER — APPOINTMENT (EMERGENCY)
Dept: NON INVASIVE DIAGNOSTICS | Facility: HOSPITAL | Age: 61
DRG: 603 | End: 2021-12-21
Payer: COMMERCIAL

## 2021-12-21 ENCOUNTER — HOSPITAL ENCOUNTER (INPATIENT)
Facility: HOSPITAL | Age: 61
LOS: 1 days | Discharge: HOME | DRG: 603 | End: 2021-12-22
Attending: EMERGENCY MEDICINE | Admitting: FAMILY MEDICINE
Payer: COMMERCIAL

## 2021-12-21 DIAGNOSIS — R60.0 LEG EDEMA, RIGHT: ICD-10-CM

## 2021-12-21 DIAGNOSIS — L03.115 CELLULITIS OF RIGHT LOWER EXTREMITY: Primary | ICD-10-CM

## 2021-12-21 PROBLEM — G93.41 ACUTE METABOLIC ENCEPHALOPATHY: Status: RESOLVED | Noted: 2019-12-28 | Resolved: 2021-12-21

## 2021-12-21 PROBLEM — A41.9 SEPSIS (HCC): Status: RESOLVED | Noted: 2021-03-07 | Resolved: 2021-12-21

## 2021-12-21 PROBLEM — R78.81 GRAM-POSITIVE COCCI BACTEREMIA: Status: RESOLVED | Noted: 2021-03-09 | Resolved: 2021-12-21

## 2021-12-21 LAB
ANION GAP SERPL CALCULATED.3IONS-SCNC: 11 MMOL/L (ref 4–13)
APTT PPP: 39 SECONDS (ref 23–37)
BASOPHILS # BLD MANUAL: 0 THOUSAND/UL (ref 0–0.1)
BASOPHILS NFR MAR MANUAL: 0 % (ref 0–1)
BUN SERPL-MCNC: 18 MG/DL (ref 5–25)
CALCIUM SERPL-MCNC: 9.3 MG/DL (ref 8.3–10.1)
CHLORIDE SERPL-SCNC: 96 MMOL/L (ref 100–108)
CO2 SERPL-SCNC: 27 MMOL/L (ref 21–32)
CREAT SERPL-MCNC: 0.99 MG/DL (ref 0.6–1.3)
EOSINOPHIL # BLD MANUAL: 0 THOUSAND/UL (ref 0–0.4)
EOSINOPHIL NFR BLD MANUAL: 0 % (ref 0–6)
ERYTHROCYTE [DISTWIDTH] IN BLOOD BY AUTOMATED COUNT: 13.1 % (ref 11.6–15.1)
GFR SERPL CREATININE-BSD FRML MDRD: 61 ML/MIN/1.73SQ M
GLUCOSE SERPL-MCNC: 109 MG/DL (ref 65–140)
HCT VFR BLD AUTO: 42.9 % (ref 34.8–46.1)
HGB BLD-MCNC: 13.6 G/DL (ref 11.5–15.4)
INR PPP: 1.12 (ref 0.84–1.19)
LYMPHOCYTES # BLD AUTO: 1.63 THOUSAND/UL (ref 0.6–4.47)
LYMPHOCYTES # BLD AUTO: 6 % (ref 14–44)
MCH RBC QN AUTO: 28 PG (ref 26.8–34.3)
MCHC RBC AUTO-ENTMCNC: 31.7 G/DL (ref 31.4–37.4)
MCV RBC AUTO: 89 FL (ref 82–98)
MONOCYTES # BLD AUTO: 1.09 THOUSAND/UL (ref 0–1.22)
MONOCYTES NFR BLD: 4 % (ref 4–12)
NEUTROPHILS # BLD MANUAL: 24.43 THOUSAND/UL (ref 1.85–7.62)
NEUTS SEG NFR BLD AUTO: 90 % (ref 43–75)
PLATELET # BLD AUTO: 305 THOUSANDS/UL (ref 149–390)
PLATELET BLD QL SMEAR: ADEQUATE
PMV BLD AUTO: 9.6 FL (ref 8.9–12.7)
POTASSIUM SERPL-SCNC: 4.2 MMOL/L (ref 3.5–5.3)
PROTHROMBIN TIME: 14.3 SECONDS (ref 11.6–14.5)
RBC # BLD AUTO: 4.85 MILLION/UL (ref 3.81–5.12)
RBC MORPH BLD: NORMAL
SODIUM SERPL-SCNC: 134 MMOL/L (ref 136–145)
TSH SERPL DL<=0.05 MIU/L-ACNC: 1.14 UIU/ML (ref 0.36–3.74)
WBC # BLD AUTO: 27.14 THOUSAND/UL (ref 4.31–10.16)

## 2021-12-21 PROCEDURE — 85007 BL SMEAR W/DIFF WBC COUNT: CPT | Performed by: EMERGENCY MEDICINE

## 2021-12-21 PROCEDURE — 36415 COLL VENOUS BLD VENIPUNCTURE: CPT | Performed by: EMERGENCY MEDICINE

## 2021-12-21 PROCEDURE — 94664 DEMO&/EVAL PT USE INHALER: CPT

## 2021-12-21 PROCEDURE — 84443 ASSAY THYROID STIM HORMONE: CPT | Performed by: FAMILY MEDICINE

## 2021-12-21 PROCEDURE — 99223 1ST HOSP IP/OBS HIGH 75: CPT | Performed by: FAMILY MEDICINE

## 2021-12-21 PROCEDURE — 85610 PROTHROMBIN TIME: CPT | Performed by: EMERGENCY MEDICINE

## 2021-12-21 PROCEDURE — 93971 EXTREMITY STUDY: CPT

## 2021-12-21 PROCEDURE — 85027 COMPLETE CBC AUTOMATED: CPT | Performed by: EMERGENCY MEDICINE

## 2021-12-21 PROCEDURE — 85730 THROMBOPLASTIN TIME PARTIAL: CPT | Performed by: EMERGENCY MEDICINE

## 2021-12-21 PROCEDURE — 80048 BASIC METABOLIC PNL TOTAL CA: CPT | Performed by: EMERGENCY MEDICINE

## 2021-12-21 PROCEDURE — 93971 EXTREMITY STUDY: CPT | Performed by: SURGERY

## 2021-12-21 PROCEDURE — 90682 RIV4 VACC RECOMBINANT DNA IM: CPT | Performed by: FAMILY MEDICINE

## 2021-12-21 PROCEDURE — 87040 BLOOD CULTURE FOR BACTERIA: CPT | Performed by: EMERGENCY MEDICINE

## 2021-12-21 PROCEDURE — 85025 COMPLETE CBC W/AUTO DIFF WBC: CPT | Performed by: EMERGENCY MEDICINE

## 2021-12-21 PROCEDURE — 99285 EMERGENCY DEPT VISIT HI MDM: CPT | Performed by: EMERGENCY MEDICINE

## 2021-12-21 PROCEDURE — 94760 N-INVAS EAR/PLS OXIMETRY 1: CPT

## 2021-12-21 PROCEDURE — 99285 EMERGENCY DEPT VISIT HI MDM: CPT

## 2021-12-21 PROCEDURE — 90471 IMMUNIZATION ADMIN: CPT | Performed by: FAMILY MEDICINE

## 2021-12-21 RX ORDER — ASPIRIN 81 MG/1
81 TABLET ORAL DAILY
Status: DISCONTINUED | OUTPATIENT
Start: 2021-12-21 | End: 2021-12-22 | Stop reason: HOSPADM

## 2021-12-21 RX ORDER — LEVOTHYROXINE SODIUM 0.12 MG/1
125 TABLET ORAL
Status: DISCONTINUED | OUTPATIENT
Start: 2021-12-22 | End: 2021-12-22 | Stop reason: HOSPADM

## 2021-12-21 RX ORDER — SODIUM CHLORIDE 9 MG/ML
100 INJECTION, SOLUTION INTRAVENOUS CONTINUOUS
Status: DISCONTINUED | OUTPATIENT
Start: 2021-12-21 | End: 2021-12-22

## 2021-12-21 RX ORDER — CEFTRIAXONE 1 G/50ML
1000 INJECTION, SOLUTION INTRAVENOUS ONCE
Status: COMPLETED | OUTPATIENT
Start: 2021-12-21 | End: 2021-12-21

## 2021-12-21 RX ORDER — MELATONIN
2000 DAILY
Status: DISCONTINUED | OUTPATIENT
Start: 2021-12-21 | End: 2021-12-22 | Stop reason: HOSPADM

## 2021-12-21 RX ORDER — ACETAMINOPHEN 325 MG/1
650 TABLET ORAL EVERY 6 HOURS PRN
Status: DISCONTINUED | OUTPATIENT
Start: 2021-12-21 | End: 2021-12-22 | Stop reason: HOSPADM

## 2021-12-21 RX ORDER — NYSTATIN 100000 [USP'U]/G
POWDER TOPICAL 2 TIMES DAILY
Status: DISCONTINUED | OUTPATIENT
Start: 2021-12-21 | End: 2021-12-22 | Stop reason: HOSPADM

## 2021-12-21 RX ORDER — FOLIC ACID 1 MG/1
1 TABLET ORAL DAILY
Status: DISCONTINUED | OUTPATIENT
Start: 2021-12-21 | End: 2021-12-22 | Stop reason: HOSPADM

## 2021-12-21 RX ORDER — SPIRONOLACTONE 25 MG/1
50 TABLET ORAL DAILY
Status: DISCONTINUED | OUTPATIENT
Start: 2021-12-21 | End: 2021-12-22 | Stop reason: HOSPADM

## 2021-12-21 RX ORDER — FLUOXETINE HYDROCHLORIDE 20 MG/1
20 CAPSULE ORAL DAILY
Status: DISCONTINUED | OUTPATIENT
Start: 2021-12-21 | End: 2021-12-22 | Stop reason: HOSPADM

## 2021-12-21 RX ORDER — POLYETHYLENE GLYCOL 3350 17 G/17G
17 POWDER, FOR SOLUTION ORAL DAILY
Status: DISCONTINUED | OUTPATIENT
Start: 2021-12-21 | End: 2021-12-22 | Stop reason: HOSPADM

## 2021-12-21 RX ORDER — DOCUSATE SODIUM 100 MG/1
100 CAPSULE, LIQUID FILLED ORAL 2 TIMES DAILY
Status: DISCONTINUED | OUTPATIENT
Start: 2021-12-21 | End: 2021-12-22 | Stop reason: HOSPADM

## 2021-12-21 RX ORDER — ONDANSETRON 2 MG/ML
4 INJECTION INTRAMUSCULAR; INTRAVENOUS EVERY 6 HOURS PRN
Status: DISCONTINUED | OUTPATIENT
Start: 2021-12-21 | End: 2021-12-22 | Stop reason: HOSPADM

## 2021-12-21 RX ORDER — BUPROPION HYDROCHLORIDE 150 MG/1
300 TABLET ORAL DAILY
Status: DISCONTINUED | OUTPATIENT
Start: 2021-12-21 | End: 2021-12-22 | Stop reason: HOSPADM

## 2021-12-21 RX ADMIN — VANCOMYCIN HYDROCHLORIDE 2000 MG: 1 INJECTION, POWDER, LYOPHILIZED, FOR SOLUTION INTRAVENOUS at 12:40

## 2021-12-21 RX ADMIN — CEFTRIAXONE 1000 MG: 1 INJECTION, SOLUTION INTRAVENOUS at 10:30

## 2021-12-21 RX ADMIN — INFLUENZA A VIRUS A/WISCONSIN/588/2019 (H1N1) RECOMBINANT HEMAGGLUTININ ANTIGEN, INFLUENZA A VIRUS A/TASMANIA/503/2020 (H3N2) RECOMBINANT HEMAGGLUTININ ANTIGEN, INFLUENZA B VIRUS B/WASHINGTON/02/2019 RECOMBINANT HEMAGGLUTININ ANTIGEN, AND INFLUENZA B VIRUS B/PHUKET/3073/2013 RECOMBINANT HEMAGGLUTININ ANTIGEN 0.5 ML: 45; 45; 45; 45 INJECTION INTRAMUSCULAR at 21:21

## 2021-12-21 RX ADMIN — NYSTATIN: 100000 POWDER TOPICAL at 23:34

## 2021-12-21 RX ADMIN — SODIUM CHLORIDE 100 ML/HR: 0.9 INJECTION, SOLUTION INTRAVENOUS at 20:00

## 2021-12-21 RX ADMIN — VANCOMYCIN HYDROCHLORIDE 2000 MG: 5 INJECTION, POWDER, LYOPHILIZED, FOR SOLUTION INTRAVENOUS at 23:51

## 2021-12-22 ENCOUNTER — APPOINTMENT (EMERGENCY)
Dept: RADIOLOGY | Facility: HOSPITAL | Age: 61
End: 2021-12-22
Payer: COMMERCIAL

## 2021-12-22 ENCOUNTER — HOSPITAL ENCOUNTER (EMERGENCY)
Facility: HOSPITAL | Age: 61
Discharge: HOME | End: 2021-12-22
Attending: EMERGENCY MEDICINE
Payer: COMMERCIAL

## 2021-12-22 VITALS
HEIGHT: 62 IN | TEMPERATURE: 98.6 F | HEART RATE: 95 BPM | OXYGEN SATURATION: 91 % | DIASTOLIC BLOOD PRESSURE: 96 MMHG | BODY MASS INDEX: 53.92 KG/M2 | WEIGHT: 293 LBS | RESPIRATION RATE: 18 BRPM | SYSTOLIC BLOOD PRESSURE: 137 MMHG

## 2021-12-22 VITALS
SYSTOLIC BLOOD PRESSURE: 142 MMHG | HEIGHT: 62 IN | WEIGHT: 293 LBS | RESPIRATION RATE: 20 BRPM | DIASTOLIC BLOOD PRESSURE: 91 MMHG | TEMPERATURE: 96 F | BODY MASS INDEX: 53.92 KG/M2 | HEART RATE: 76 BPM | OXYGEN SATURATION: 94 %

## 2021-12-22 DIAGNOSIS — T07.XXXA MULTIPLE ABRASIONS: ICD-10-CM

## 2021-12-22 DIAGNOSIS — S00.83XA TRAUMATIC HEMATOMA OF FOREHEAD, INITIAL ENCOUNTER: ICD-10-CM

## 2021-12-22 DIAGNOSIS — S52.502A DISTAL RADIUS FRACTURE, LEFT: Primary | ICD-10-CM

## 2021-12-22 LAB
BASOPHILS # BLD AUTO: 0.03 THOUSANDS/ΜL (ref 0–0.1)
BASOPHILS NFR BLD AUTO: 0 % (ref 0–1)
EOSINOPHIL # BLD AUTO: 0.37 THOUSAND/ΜL (ref 0–0.61)
EOSINOPHIL NFR BLD AUTO: 3 % (ref 0–6)
ERYTHROCYTE [DISTWIDTH] IN BLOOD BY AUTOMATED COUNT: 13.1 % (ref 11.6–15.1)
HCT VFR BLD AUTO: 40.4 % (ref 34.8–46.1)
HGB BLD-MCNC: 12.8 G/DL (ref 11.5–15.4)
IMM GRANULOCYTES # BLD AUTO: 0.07 THOUSAND/UL (ref 0–0.2)
IMM GRANULOCYTES NFR BLD AUTO: 1 % (ref 0–2)
LYMPHOCYTES # BLD AUTO: 0.88 THOUSANDS/ΜL (ref 0.6–4.47)
LYMPHOCYTES NFR BLD AUTO: 7 % (ref 14–44)
MCH RBC QN AUTO: 28 PG (ref 26.8–34.3)
MCHC RBC AUTO-ENTMCNC: 31.7 G/DL (ref 31.4–37.4)
MCV RBC AUTO: 88 FL (ref 82–98)
MONOCYTES # BLD AUTO: 0.48 THOUSAND/ΜL (ref 0.17–1.22)
MONOCYTES NFR BLD AUTO: 4 % (ref 4–12)
NEUTROPHILS # BLD AUTO: 10.89 THOUSANDS/ΜL (ref 1.85–7.62)
NEUTS SEG NFR BLD AUTO: 85 % (ref 43–75)
NRBC BLD AUTO-RTO: 0 /100 WBCS
PLATELET # BLD AUTO: 253 THOUSANDS/UL (ref 149–390)
PMV BLD AUTO: 9.5 FL (ref 8.9–12.7)
RBC # BLD AUTO: 4.57 MILLION/UL (ref 3.81–5.12)
WBC # BLD AUTO: 12.72 THOUSAND/UL (ref 4.31–10.16)

## 2021-12-22 PROCEDURE — 73110 X-RAY EXAM OF WRIST: CPT

## 2021-12-22 PROCEDURE — 99284 EMERGENCY DEPT VISIT MOD MDM: CPT | Performed by: EMERGENCY MEDICINE

## 2021-12-22 PROCEDURE — 99239 HOSP IP/OBS DSCHRG MGMT >30: CPT | Performed by: FAMILY MEDICINE

## 2021-12-22 PROCEDURE — 94660 CPAP INITIATION&MGMT: CPT

## 2021-12-22 PROCEDURE — 99283 EMERGENCY DEPT VISIT LOW MDM: CPT

## 2021-12-22 PROCEDURE — 29125 APPL SHORT ARM SPLINT STATIC: CPT | Performed by: EMERGENCY MEDICINE

## 2021-12-22 PROCEDURE — 85025 COMPLETE CBC W/AUTO DIFF WBC: CPT | Performed by: FAMILY MEDICINE

## 2021-12-22 PROCEDURE — 94760 N-INVAS EAR/PLS OXIMETRY 1: CPT

## 2021-12-22 RX ORDER — SULFAMETHOXAZOLE AND TRIMETHOPRIM 800; 160 MG/1; MG/1
1 TABLET ORAL EVERY 12 HOURS SCHEDULED
Qty: 14 TABLET | Refills: 0 | Status: SHIPPED | OUTPATIENT
Start: 2021-12-22 | End: 2021-12-29

## 2021-12-22 RX ADMIN — FOLIC ACID 1 MG: 1 TABLET ORAL at 08:10

## 2021-12-22 RX ADMIN — ENOXAPARIN SODIUM 40 MG: 40 INJECTION SUBCUTANEOUS at 08:10

## 2021-12-22 RX ADMIN — NYSTATIN: 100000 POWDER TOPICAL at 08:12

## 2021-12-22 RX ADMIN — SPIRONOLACTONE 50 MG: 25 TABLET ORAL at 08:11

## 2021-12-22 RX ADMIN — ASPIRIN 81 MG: 81 TABLET, COATED ORAL at 08:11

## 2021-12-22 RX ADMIN — BUPROPION 300 MG: 150 TABLET, EXTENDED RELEASE ORAL at 08:10

## 2021-12-22 RX ADMIN — VANCOMYCIN HYDROCHLORIDE 2000 MG: 5 INJECTION, POWDER, LYOPHILIZED, FOR SOLUTION INTRAVENOUS at 12:20

## 2021-12-22 RX ADMIN — Medication 2000 UNITS: at 08:10

## 2021-12-22 RX ADMIN — LEVOTHYROXINE SODIUM 125 MCG: 125 TABLET ORAL at 06:13

## 2021-12-22 RX ADMIN — SODIUM CHLORIDE 100 ML/HR: 0.9 INJECTION, SOLUTION INTRAVENOUS at 08:11

## 2021-12-22 RX ADMIN — FLUOXETINE 20 MG: 20 CAPSULE ORAL at 08:11

## 2021-12-26 LAB
BACTERIA BLD CULT: NORMAL
BACTERIA BLD CULT: NORMAL

## 2021-12-27 ENCOUNTER — HOSPITAL ENCOUNTER (OUTPATIENT)
Dept: RADIOLOGY | Facility: CLINIC | Age: 61
Discharge: HOME | End: 2021-12-27
Payer: COMMERCIAL

## 2021-12-27 ENCOUNTER — OFFICE VISIT (OUTPATIENT)
Dept: OBGYN CLINIC | Facility: CLINIC | Age: 61
End: 2021-12-27
Payer: COMMERCIAL

## 2021-12-27 VITALS
DIASTOLIC BLOOD PRESSURE: 80 MMHG | WEIGHT: 293 LBS | BODY MASS INDEX: 53.92 KG/M2 | HEART RATE: 78 BPM | HEIGHT: 62 IN | SYSTOLIC BLOOD PRESSURE: 120 MMHG | TEMPERATURE: 96.8 F

## 2021-12-27 DIAGNOSIS — S52.532A CLOSED COLLES' FRACTURE OF LEFT RADIUS, INITIAL ENCOUNTER: ICD-10-CM

## 2021-12-27 DIAGNOSIS — M25.532 PAIN IN LEFT WRIST: Primary | ICD-10-CM

## 2021-12-27 PROCEDURE — 73110 X-RAY EXAM OF WRIST: CPT

## 2021-12-27 PROCEDURE — 99204 OFFICE O/P NEW MOD 45 MIN: CPT | Performed by: ORTHOPAEDIC SURGERY

## 2021-12-27 PROCEDURE — 25605 CLTX DST RDL FX/EPHYS SEP W/: CPT | Performed by: ORTHOPAEDIC SURGERY

## 2022-01-03 ENCOUNTER — OFFICE VISIT (OUTPATIENT)
Dept: OBGYN CLINIC | Facility: CLINIC | Age: 62
End: 2022-01-03

## 2022-01-03 ENCOUNTER — HOSPITAL ENCOUNTER (OUTPATIENT)
Dept: RADIOLOGY | Facility: CLINIC | Age: 62
Discharge: HOME | End: 2022-01-03
Payer: COMMERCIAL

## 2022-01-03 VITALS
SYSTOLIC BLOOD PRESSURE: 110 MMHG | BODY MASS INDEX: 53.92 KG/M2 | HEART RATE: 80 BPM | TEMPERATURE: 96.2 F | HEIGHT: 62 IN | WEIGHT: 293 LBS | DIASTOLIC BLOOD PRESSURE: 64 MMHG

## 2022-01-03 DIAGNOSIS — S52.532D CLOSED COLLES' FRACTURE OF LEFT RADIUS WITH ROUTINE HEALING, SUBSEQUENT ENCOUNTER: Primary | ICD-10-CM

## 2022-01-03 DIAGNOSIS — S52.532D CLOSED COLLES' FRACTURE OF LEFT RADIUS WITH ROUTINE HEALING, SUBSEQUENT ENCOUNTER: ICD-10-CM

## 2022-01-03 PROCEDURE — 73100 X-RAY EXAM OF WRIST: CPT

## 2022-01-03 PROCEDURE — 99024 POSTOP FOLLOW-UP VISIT: CPT | Performed by: ORTHOPAEDIC SURGERY

## 2022-01-03 NOTE — PROGRESS NOTES
Patient Name:  Handy Palmer  MRN:  [id number]    Assessment     1  Closed Colles' fracture of left radius with routine healing, subsequent encounter  CANCELED: XR wrist 3+ vw left       Plan     1  I would recommend follow-up in 1 week  X-rays will be obtained in her splint  If the fracture remains in good alignment, she will be switched to a cast   She is to continue with the precautions as previously discussed  Return in about 1 week (around 1/10/2022)  Subjective   Handy Palmer returns for follow-up of her distal left radius fracture  The patient is 1 week(s) post reduction and splinting and returns for routine follow-up  Patient denies pain  She denies paresthesias  Objective     /64   Pulse 80   Temp (!) 96 2 °F (35 7 °C)   Ht 5' 2" (1 575 m)   Wt (!) 171 kg (377 lb)   BMI 68 95 kg/m²     Exam demonstrated the splint in acceptable position  This has translated somewhat distally due to her body habitus  Fingers demonstrate good color and capillary refill  There is no significant swelling  Data Review     I have personally reviewed pertinent films in PACS demonstrating the fracture to remain in acceptable alignment no significant change from the immediate post reduction images      Elisa Seat

## 2022-01-10 ENCOUNTER — OFFICE VISIT (OUTPATIENT)
Dept: OBGYN CLINIC | Facility: CLINIC | Age: 62
End: 2022-01-10
Payer: COMMERCIAL

## 2022-01-10 ENCOUNTER — HOSPITAL ENCOUNTER (OUTPATIENT)
Dept: RADIOLOGY | Facility: CLINIC | Age: 62
Discharge: HOME | End: 2022-01-10
Payer: COMMERCIAL

## 2022-01-10 VITALS
BODY MASS INDEX: 53.92 KG/M2 | HEART RATE: 72 BPM | WEIGHT: 293 LBS | TEMPERATURE: 97.6 F | SYSTOLIC BLOOD PRESSURE: 126 MMHG | HEIGHT: 62 IN | DIASTOLIC BLOOD PRESSURE: 74 MMHG

## 2022-01-10 DIAGNOSIS — S52.532D CLOSED COLLES' FRACTURE OF LEFT RADIUS WITH ROUTINE HEALING, SUBSEQUENT ENCOUNTER: Primary | ICD-10-CM

## 2022-01-10 DIAGNOSIS — S52.532D CLOSED COLLES' FRACTURE OF LEFT RADIUS WITH ROUTINE HEALING, SUBSEQUENT ENCOUNTER: ICD-10-CM

## 2022-01-10 PROCEDURE — 73100 X-RAY EXAM OF WRIST: CPT

## 2022-01-10 PROCEDURE — 29075 APPL CST ELBW FNGR SHORT ARM: CPT | Performed by: ORTHOPAEDIC SURGERY

## 2022-01-10 PROCEDURE — 99024 POSTOP FOLLOW-UP VISIT: CPT | Performed by: ORTHOPAEDIC SURGERY

## 2022-01-10 NOTE — PROGRESS NOTES
Patient Name:  Mery Francisco  MRN:  [id number]    Assessment     1  Closed Colles' fracture of left radius with routine healing, subsequent encounter  XR wrist 2 vw left       Plan     1  I reviewed the x-rays and indicated that we had lost the reduction that had been obtained previously  I recommended surgical intervention to consist of open reduction internal fixation  However, she adamantly refuses surgery despite being informed of the fact that she will likely have a poor long-term prognosis without surgery  A short-arm cast was applied  I will see her in 2 weeks  X-rays will be obtained  She is to contact me if questions or concerns arise in the interim  Return in about 2 weeks (around 1/24/2022)  Subjective   Mery Francisco returns for follow-up of her left wrist fracture  The patient is 2 week(s) post closed reduction and splinting and returns for routine follow-up  Patient complains of significant looseness of the splint and states that it can even come nearly completely off  She denies any paresthesias  Objective     /74   Pulse 72   Temp 97 6 °F (36 4 °C) (Temporal)   Ht 5' 2" (1 575 m)   Wt (!) 171 kg (377 lb)   BMI 68 95 kg/m²     Exam today demonstrated the splint in place  This had significantly migrated distally and was removed without having to loosen the splint at all  She has no gross deformity  The skin is intact  She has tenderness over the distal radius and ulna  There is resolving ecchymosis but no gross deformity to inspection  Data Review     I have personally reviewed pertinent films in PACS demonstrating loss of the reduction with shortening of the distal radius, loss of the radial tilt and loss of the volar tilt into a dorsiflexed position      John Laguna

## 2022-01-26 ENCOUNTER — OFFICE VISIT (OUTPATIENT)
Dept: OBGYN CLINIC | Facility: CLINIC | Age: 62
End: 2022-01-26

## 2022-01-26 ENCOUNTER — HOSPITAL ENCOUNTER (OUTPATIENT)
Dept: RADIOLOGY | Facility: CLINIC | Age: 62
Discharge: HOME | End: 2022-01-26
Payer: COMMERCIAL

## 2022-01-26 VITALS
DIASTOLIC BLOOD PRESSURE: 88 MMHG | WEIGHT: 293 LBS | TEMPERATURE: 96.7 F | BODY MASS INDEX: 53.92 KG/M2 | HEIGHT: 62 IN | HEART RATE: 88 BPM | SYSTOLIC BLOOD PRESSURE: 142 MMHG

## 2022-01-26 DIAGNOSIS — S52.532D CLOSED COLLES' FRACTURE OF LEFT RADIUS WITH ROUTINE HEALING, SUBSEQUENT ENCOUNTER: Primary | ICD-10-CM

## 2022-01-26 DIAGNOSIS — S52.532D CLOSED COLLES' FRACTURE OF LEFT RADIUS WITH ROUTINE HEALING, SUBSEQUENT ENCOUNTER: ICD-10-CM

## 2022-01-26 PROCEDURE — 99024 POSTOP FOLLOW-UP VISIT: CPT | Performed by: ORTHOPAEDIC SURGERY

## 2022-01-26 PROCEDURE — 73100 X-RAY EXAM OF WRIST: CPT

## 2022-01-26 NOTE — PROGRESS NOTES
Patient Name:  Grey Mora  MRN:  [id number]    Assessment     1  Closed Colles' fracture of left radius with routine healing, subsequent encounter  XR wrist 2 vw left       Plan     1  X-rays taken today were reviewed and discussed with the patient, which shows unchanged alignment of the fracture and good callous formation  The patient was instructed to continue with cast care as instructed  She will follow up in 2 weeks for recheck with repeat left wrist x-rays out of cast      Return in about 2 weeks (around 2/9/2022) for Recheck  Subjective   Grey Mora returns for follow-up of a left wrist fracture  Initial date of injury 12/22/2021  The patient is 4 week(s) post closed reduction and splinting and returns for routine follow-up  At the patient's last office visit on 1/10/2022 x-rays confirmed loss of reduction, though the patient continued to adamantly refuse surgery  At that time the patient was fitted with a short arm cast   Today the patient states that she is overall doing very well  She has maintained the cast as instructed  The patient denies any pain, numbness, tingling, or swelling in the upper extremity  Objective     /88   Pulse 88   Temp (!) 96 7 °F (35 9 °C) (Temporal)   Ht 5' 2" (1 575 m)   Wt (!) 171 kg (377 lb)   BMI 68 95 kg/m²     Left upper extremity:  -short arm cast is clean, dry, intact  Skin above and below the cast without lesions, ecchymosis, erythema, warmth, swelling, or other signs of infection or irritation  -patient is able to wiggle fingers  -sensation intact to light touch along the radian, medial, ulnar nerve distributions  -brisk cap refill all fingers, upper extremities well perfused      Data Review     I have personally reviewed pertinent films and reports in PACS and my interpretation is as follows:      X-rays of the left wrist taken today in office demonstrate unchanged alignment of the fracture compared to the last images taken   There is good callous formation present         Tyson Boston PA-C

## 2022-02-14 ENCOUNTER — OFFICE VISIT (OUTPATIENT)
Dept: OBGYN CLINIC | Facility: CLINIC | Age: 62
End: 2022-02-14

## 2022-02-14 ENCOUNTER — HOSPITAL ENCOUNTER (OUTPATIENT)
Dept: RADIOLOGY | Facility: CLINIC | Age: 62
Discharge: HOME | End: 2022-02-14
Payer: COMMERCIAL

## 2022-02-14 VITALS
DIASTOLIC BLOOD PRESSURE: 76 MMHG | HEART RATE: 79 BPM | SYSTOLIC BLOOD PRESSURE: 124 MMHG | OXYGEN SATURATION: 97 % | TEMPERATURE: 97.1 F | BODY MASS INDEX: 53.92 KG/M2 | HEIGHT: 62 IN | WEIGHT: 293 LBS

## 2022-02-14 DIAGNOSIS — S52.532D CLOSED COLLES' FRACTURE OF LEFT RADIUS WITH ROUTINE HEALING, SUBSEQUENT ENCOUNTER: ICD-10-CM

## 2022-02-14 DIAGNOSIS — S52.532D CLOSED COLLES' FRACTURE OF LEFT RADIUS WITH ROUTINE HEALING, SUBSEQUENT ENCOUNTER: Primary | ICD-10-CM

## 2022-02-14 DIAGNOSIS — M25.532 PAIN IN LEFT WRIST: ICD-10-CM

## 2022-02-14 PROCEDURE — 73100 X-RAY EXAM OF WRIST: CPT

## 2022-02-14 PROCEDURE — 99024 POSTOP FOLLOW-UP VISIT: CPT | Performed by: ORTHOPAEDIC SURGERY

## 2022-02-14 RX ORDER — TRAMADOL HYDROCHLORIDE 50 MG/1
TABLET ORAL
COMMUNITY
Start: 2022-01-31

## 2022-02-14 NOTE — PROGRESS NOTES
Patient Name:  Anastasia Christina  MRN:  [id number]    Assessment     1  Closed Colles' fracture of left radius with routine healing, subsequent encounter  XR wrist 2 vw left    Brace    Ambulatory Referral to Physical Therapy   2  Pain in left wrist         Plan     1  The patient is now 7 5 weeks post initial injury  X-rays taken today were reviewed and discussed with the patient which demonstrated well healing fracture in unchanged alignment  2  The patient was fitted with a wrist brace today in office  The patient was instructed to wear the brace at all times with activity  She may remove the brace for hygiene,home exercise/PT, and while sleeping if she feels comfortable  3  The patient was given a script to begin PT, focusing on wrist ROM  The patient was instructed to avoid lifting, pushing, pulling activities with the wrist    4  She will return to the office in 3-4 weeks for recheck and repeat x-rays of the wrist  She was instructed to call or return to the office sooner if any problems arise  Return for 3-4 weeks  Subjective   Anastasia Christina returns for follow-up of a left radius fracture sustained on 12/22/2021  The patient is 7 5 week(s) post injury and returns for routine follow-up  The patient's fracture has been managed conservatively with closed reduction and splinting with subsequent casting  Today the patient states that she is overall doing well  She reports no significant pain, swelling, numbness, or tingling in the left upper extremity  The patient has been maintaining the cast as instructed  Objective     /76 (BP Location: Right arm, Patient Position: Sitting, Cuff Size: Standard)   Pulse 79   Temp (!) 97 1 °F (36 2 °C)   Ht 5' 2" (1 575 m)   Wt (!) 171 kg (377 lb)   SpO2 97%   BMI 68 95 kg/m²     Left wrist:  - cast removed today in office for imaging and examination   - Skin is clean and dry   There are no lesions, ecchymosis, erythema, swelling, warmth or other signs of infection  There is an observable ulnar deviation to the wrist    - There is no palpable tenderness along the distal radius or ulna     - No DRUJ instability  - very limited active wrist ROM, with some pain elicited with dorsal flexion  - 5/5  strength  - Full ROM of all digits  - Sensation and motor intact along the radial, median, and ulnar nerve distributions  - Strong radial pulse  - Brisk cap refill in all fingers        Data Review     I have personally reviewed pertinent films and reports in PACS and my interpretation is as follows:     X-rays of the left wrist taken today in office demonstrate a well healing distal radial fracture with callous formation and unchanged dorsal angulation compared to previous x-rays      Neisha Urrutia PA-C

## 2022-03-14 ENCOUNTER — OFFICE VISIT (OUTPATIENT)
Dept: OBGYN CLINIC | Facility: CLINIC | Age: 62
End: 2022-03-14

## 2022-03-14 ENCOUNTER — HOSPITAL ENCOUNTER (OUTPATIENT)
Dept: RADIOLOGY | Facility: CLINIC | Age: 62
Discharge: HOME | End: 2022-03-14
Payer: COMMERCIAL

## 2022-03-14 VITALS
HEART RATE: 76 BPM | BODY MASS INDEX: 53.92 KG/M2 | SYSTOLIC BLOOD PRESSURE: 134 MMHG | HEIGHT: 62 IN | TEMPERATURE: 96.8 F | DIASTOLIC BLOOD PRESSURE: 82 MMHG | WEIGHT: 293 LBS

## 2022-03-14 DIAGNOSIS — M25.532 LEFT WRIST PAIN: ICD-10-CM

## 2022-03-14 DIAGNOSIS — S52.532D CLOSED COLLES' FRACTURE OF LEFT RADIUS WITH ROUTINE HEALING, SUBSEQUENT ENCOUNTER: ICD-10-CM

## 2022-03-14 DIAGNOSIS — S52.532D CLOSED COLLES' FRACTURE OF LEFT RADIUS WITH ROUTINE HEALING, SUBSEQUENT ENCOUNTER: Primary | ICD-10-CM

## 2022-03-14 PROCEDURE — 99024 POSTOP FOLLOW-UP VISIT: CPT | Performed by: ORTHOPAEDIC SURGERY

## 2022-03-14 PROCEDURE — 73100 X-RAY EXAM OF WRIST: CPT

## 2022-03-14 NOTE — PROGRESS NOTES
Patient Name:  Perla Gross  MRN:  [id number]    Assessment     1  Closed Colles' fracture of left radius with routine healing, subsequent encounter  XR wrist 2 vw left   2  Left wrist pain         Plan     1  I would recommend follow-up as needed  I offered to see Malgorzata Griffin in approximately 4-6 weeks to check on her range of motion and the residual symptoms  However, she would prefer to return only if needed  She is unable to attend physical therapy for her wrist as she is receiving therapy for other reasons and her insurance will not pay for her to have therapy for her wrist as well  I have encouraged her to continue to increase her activity level and wean from the cock-up wrist brace  Return if symptoms worsen or fail to improve  Subjective   Perla Gross returns for follow-up of her left distal radius fracture  The patient is nearly 3 month(s) post injury and returns for routine follow-up  Patient complains of intermittent pain along the ulnar aspect of her left wrist   This occurs primarily when she tries to lift something  For the most part, she is not using the brace at home anymore  She has not been able to attend physical therapy as her insurance will not pay for wrist therapy while she is attending therapy for an alternative reason and she prefers to continue attending therapy for the other reason  Objective     /82   Pulse 76   Temp (!) 96 8 °F (36 °C) (Temporal)   Ht 5' 2" (1 575 m)   Wt (!) 171 kg (377 lb)   BMI 68 95 kg/m²     The exam today demonstrated the brace in place upon arrival   This was removed without difficulty  She denies tenderness to palpation of the distal radius or ulna  Mild deformity is present consistent with the x-ray appearance  She has about 30° of dorsiflexion and 20° of volar flexion of the wrist   She had about 45° of supination and 60-70 degrees of pronation  She denies pain during range of motion  Distal sensation is intact    Skin is intact  Good color and capillary refill is noted  Data Review     I have personally reviewed pertinent films in PACS demonstrating good progression of healing with the residual deformity noted on previous x-rays      Dipesh Strong

## 2022-09-13 NOTE — ASSESSMENT & PLAN NOTE
· The patient was evaluated by the Wound Care nurse with the following impressions/recommendations:  Wound Care     Bilateral lower legs: wash legs with soap and water and a towel avoiding wounds to remove dead skin build up  Irrigate wounds with normal saline and pat dry  Apply thick layer of Silvasorb gel directly to wound base or apply to gauze then place directly on wound  Cover with 4x4 gauze and an ABD then secure with Kerlix and paper tape  Change once daily or as needed for strike through drainage       Right medial thigh: Apply skin prep to periwound and allow to dry  Loosely tuck 1/4" Iodoform packing strip into wound and use a small piece of tape to secure tail to skin  Cover with Allevyn non bordered foam and change daily or as needed for strike through drainage       Right posterior knee: Wash with soap and water then pat dry  Apply Maxorb Ag Rope to to inner aspect of knee, Change daily or as needed for strikethrough drainage       Right Lower Abdomen: Irrigate with normal saline then pat dry  Line underside of pannus with Maxorb Ag Rope and change daily       Wash skin folds with soap and water then pat dry  Dust skin folds underneath breasts, bilateral groin and torso with Nystatin powder three times daily       Apply Lac-Hydrin (ammonium lactate) lotion to bilateral legs avoiding wound base one to two times a day      Skin care Plan:  1-Protect sacrum w/Allevyn foam, luis enrique with P, change q3d and PRN, check skin q-shift  2-Turn/reposition q2h or when medically stable for pressure re-distribution on skin   3-Elevate heels to offload pressure]  4-Moisturize skin daily with skin nourishing cream  5-Ehob cushion in chair when out of bed  6-Hydraguard to bilateral heels BID and PRN      no body aches

## 2022-12-28 NOTE — ASSESSMENT & PLAN NOTE
· Likely secondary to acute infection  · Outpatient Cardiology evaluation    Echo complete with contrast if indicated   Status: Final result   PACS Images      Show images for Echo complete with contrast if indicated   Study Result     [address]     Transthoracic Echocardiogram  2D, M-mode, Doppler, and Color Doppler     Study date:  2020     Patient: Hiren Hutson  MR number: [id number]  Account number: [de-identified]  : 1960  Age: 61 years  Gender: Female  Status: Inpatient  Location: [facility]  Height: 63 in  Weight: 426 lb  BP: 134/ 66 mmHg     Indications: MI     Diagnoses: I21 4 - Non-ST elevation (NSTEMI) myocardial infarction     Sonographer:  HAILEY Roe  Primary Physician:  Cristela Alan DO  Referring Physician:  Aundrea Gomes DO  Group:  [facility]  Interpreting Physician:  Carissa Granados DO     IMPRESSIONS:  Technically difficult study     SUMMARY     LEFT VENTRICLE:  Difficult to assess complete regional wal motion  Mildly abnormal septal motion, more consistant with conduction delay  Size was normal   Systolic function was normal  Ejection fraction was estimated in the range of 50 % to 55 %  This study was inadequate for the evaluation of regional wall motion  Wall thickness was mildly increased      TRICUSPID VALVE:  There was trace regurgitation      HISTORY: PRIOR HISTORY: Elevated troponins, morbid obesity     PROCEDURE: The study was performed in the [facility]  This was a routine study  The transthoracic approach was used  The study included complete 2D imaging, M-mode, complete spectral Doppler, and color Doppler  The heart  rate was 87 bpm, at the start of the study  Images were obtained from the parasternal, apical, subcostal, and suprasternal notch acoustic windows   Intravenous contrast ( 1 0 ml of Definity) was administered to opacify the left ventricle  Echocardiographic views were limited due to restricted patient mobility, poor patient compliance, poor acoustic window availability, and decreased penetration  This was a technically difficult study      LEFT VENTRICLE: Difficult to assess complete regional wal motion  Mildly abnormal septal motion, more consistant with conduction delay  Size was normal  Systolic function was normal  Ejection fraction was estimated in the range of 50 % to  55 %  This study was inadequate for the evaluation of regional wall motion  Wall thickness was mildly increased  DOPPLER: Left ventricular diastolic function parameters were normal for the patient's age      RIGHT VENTRICLE: The size was normal  Systolic function was normal      LEFT ATRIUM: Size was normal      RIGHT ATRIUM: Size was normal      MITRAL VALVE: Valve structure was normal  DOPPLER: There was no significant regurgitation      AORTIC VALVE: The valve was probably trileaflet  DOPPLER: There was no significant regurgitation      TRICUSPID VALVE: The valve structure was normal  DOPPLER: There was trace regurgitation      PULMONIC VALVE: DOPPLER: There was no evidence for stenosis  There was no regurgitation      PERICARDIUM: There was no pericardial effusion      AORTA: The root exhibited normal size      SYSTEM MEASUREMENT TABLES     2D  %FS: 29 38 %  AV Diam: 2 58 cm  EDV(Teich): 72 64 ml  EF(Teich): 56 81 %  ESV(Teich): 31 37 ml  IVSd: 1 35 cm  LVEDV MOD A4C: 97 9 ml  LVEF MOD A4C: 65 45 %  LVESV MOD A4C: 33 83 ml  LVIDd: 4 06 cm  LVIDs: 2 87 cm  LVLd A4C: 8 48 cm  LVLs A4C: 6 48 cm  LVPWd: 1 06 cm  RWT: 0 52  SV MOD A4C: 64 08 ml  SV(Teich): 41 27 ml     PW  E': 0 14 m/s  E/E': 7 27  MV A Philip: 0 93 m/s  MV Dec Lamar: 11 15 m/s2  MV DecT: 92 15 ms  MV E Philip: 1 03 m/s  MV E/A Ratio: 1 11     [facility] Accredited Echocardiography Laboratory     Prepared and electronically signed by  Trenton Richard DO  Signed 03-Jan-2020 10:27:55    Consent: The risks of atrophy were reviewed with the patient. X Size Of Lesion In Cm (Optional): 0 Kenalog Preparation: Kenalog Total Volume (Ccs): 0.5 Concentration Of Kenalog Solution Injected (Mg/Ml): 20.0 Include Z78.9 (Other Specified Conditions Influencing Health Status) As An Associated Diagnosis?: No Validate Note Data When Using Inventory: Yes Detail Level: Detailed Medical Necessity Clause: This procedure was medically necessary because the lesions that were treated were:

## 2023-09-18 NOTE — ASSESSMENT & PLAN NOTE
Head, normocephalic, atraumatic, Face, Face within normal limits, Ears, External ears within normal limits · Check an acute hepatitis panel  · Avoid all hepatotoxic agents  · Follow the LFT's (liver function tests)

## 2024-08-22 NOTE — UTILIZATION REVIEW
Continued Stay Review    Date: 1/9                    Current Patient Class: INpatient   Current Level of Care: Med/surg    HPI:59 y o  female initially admitted on 12/28  Assessment/Plan:   Completed IV antibiotics for cellulitis  Continue to use Oxygen to keep saturations at 90% and above  Discharge planning in place  Awaiting insurance auth for placement  Continue with wound care to bilateral lower extremities     Pertinent Labs/Diagnostic Results:   Results from last 7 days   Lab Units 01/09/20 0449 01/06/20  0540 01/05/20  0529 01/04/20  0455 01/03/20  0513   WBC Thousand/uL 6 22 6 71 5 32 4 68 5 98   HEMOGLOBIN g/dL 9 5* 10 1* 10 1* 9 6* 9 3*   HEMATOCRIT % 35 2 36 1 36 7 36 1 34 9   PLATELETS Thousands/uL 303 298 281 278 264   NEUTROS ABS Thousands/µL 3 96 4 14 2 90 2 53 3 90         Results from last 7 days   Lab Units 01/09/20 0449 01/07/20  5301 01/06/20  0540 01/05/20  0529 01/04/20  0455 01/03/20  0513   SODIUM mmol/L 143 143 142 143 141 141   POTASSIUM mmol/L 3 6 3 7 4 4 3 8 3 5 3 7   CHLORIDE mmol/L 103 103 104 105 104 104   CO2 mmol/L 41* 39* 36* 38* 37* 36*   ANION GAP mmol/L -1* 1* 2* 0* 0* 1*   BUN mg/dL 5 6 5 7 8 10   CREATININE mg/dL 0 88 0 88 0 80 0 89 0 82 0 92   EGFR ml/min/1 73sq m 72 72 81 71 79 68   CALCIUM mg/dL 7 6* 8 0* 8 0* 8 0* 7 6* 7 6*   CALCIUM, IONIZED mmol/L 1 10*  --   --   --   --   --    MAGNESIUM mg/dL 1 9 1 9 1 9 1 9 2 0 2 0   PHOSPHORUS mg/dL 3 5  --   --  3 0 2 9 2 6*     Results from last 7 days   Lab Units 01/09/20 0449 01/06/20  0540 01/05/20  0529 01/04/20  0455 01/03/20  0513   AST U/L 15 27 15 18 20   ALT U/L 11* 15 20 24 29   ALK PHOS U/L 62 60 61 63 62   TOTAL PROTEIN g/dL 6 4 6 3* 6 4 6 3* 6 1*   ALBUMIN g/dL 1 8* 1 7* 1 8* 1 8* 1 7*   TOTAL BILIRUBIN mg/dL 0 25 0 58 0 41 0 47 0 48         Results from last 7 days   Lab Units 01/09/20  0449 01/07/20  0638 01/06/20  0540 01/05/20  0529 01/04/20  0455 01/03/20  0513   GLUCOSE RANDOM mg/dL 87 94 85 97 90 85 Patient states pharmacy says we have not responded to them for a refill on the following       metoprolol tartrate 25 MG Oral Tab 60 tablet 0 7/5/2024 --   Sig:   Take 1 tablet (25 mg total) by mouth 2 (two) times daily.     Route:   Oral     Note to Pharmacy:   Your are due to have your fasting labs completed.     Order #:   [id number]            Disp Refills Start End    allopurinol 100 MG Oral Tab 180 tablet 0 5/7/2024 --    Sig - Route: Take 2 tablets (200 mg total) by mouth daily. - Oral    Sent to pharmacy as: Allopurinol 100 MG Oral Tablet (Zyloprim)    E-Prescribing Status: Receipt confirmed by pharmacy (5/7/2024  9:13 AM CDT)      Pharmacy    [facility] [address] [place] [facility] [address] [facility] [phone], [phone]      No results found for: BETA-HYDROXYBUTYRATE   Results from last 7 days   Lab Units 01/04/20  1009   [address] ART  7 344*   PCO2 ART mm Hg 70 3*   PO2 ART mm Hg 81 3   HCO3 ART mmol/L 37 4*   BASE EXC ART mmol/L 9 6   O2 CONTENT ART mL/dL 14 7*   O2 HGB, ARTERIAL % 94 2   ABG SOURCE  Radial, Left             Results from last 7 days   Lab Units 01/04/20  0455   CK TOTAL U/L 21*     Results from last 7 days   Lab Units 01/09/20  0449 01/06/20  0540 01/03/20  0513   PROCALCITONIN ng/ml 0 12 0 13 0 11       Results from last 7 days   Lab Units 01/04/20  0455   FERRITIN ng/mL 54   Vital Signs:   Date/Time  Temp  Pulse  Resp  BP  MAP (mmHg)  SpO2  O2 Device   01/09/20 15:31:54  98 5 °F (36 9 °C)  82  15  138/70  93  96 %     01/09/20 08:37:52  99 1 °F (37 3 °C)  86  18  141/72  95  99 %     01/08/20 2209    90  18      97 %     01/08/20 22:01:57  99 1 °F (37 3 °C)  95  18  153/72  99  96 %     01/08/20 15:11:26  98 3 °F (36 8 °C)  85  16  135/60  85  96 %     01/08/20 07:29:19  98 1 °F (36 7 °C)  89  16  107/61  76  93 %     01/07/20 23:29:52  98 1 °F (36 7 °C)  92  17  119/67  84  93 %     01/07/20 23:27:30  98 1 °F (36 7 °C)  92  19  119/67  84  94 %     01/07/20 2051              Nasal cannula   01/07/20 15:10:52  98 5 °F (36 9 °C)  87  18  129/70             Medications:   Scheduled Medications:    Medications:  ammonium lactate  Topical BID   cholecalciferol 1,000 Units Oral Daily   enoxaparin 60 mg Subcutaneous Q12H ZOË   FLUoxetine 20 mg Oral Daily   folic acid 1 mg Oral Daily   levothyroxine 125 mcg Oral Early Morning   nystatin  Topical BID     Continuous IV Infusions:     PRN Meds:    acetaminophen 650 mg Oral Q6H PRN   albuterol 2 5 mg Nebulization Q4H PRN   ondansetron 4 mg Intravenous Q8H PRN       Discharge Plan:TBD  Network Utilization Review Department  [email or web address]  ATTENTION: Please call with any questions or concerns to [phone] and carefully listen to the prompts so that you are directed to the right person  All voicemails are confidential   Ava Casarez all requests for admission clinical reviews, approved or denied determinations and any other requests to dedicated fax number below belonging to the campus where the patient is receiving treatment   List of dedicated fax numbers for the Facilities:  [address] [place] [address] DENIALS (Administrative/Medical Necessity) [phone]   [address]  (Maternity/NICU/Pediatrics) [phone]   [facility] [phone]     [address]  [phone]   [address] [place] [address] [phone]   [address] [facility]  [phone]   [address] [facility] [address] [place] [address] [phone]   [facility]  [phone]   [address] [facility] [address] [facility]  [address] [facility] [address] [facility] [phone]

## 2024-09-11 ENCOUNTER — TELEPHONE (OUTPATIENT)
Facility: CLINIC | Age: 64
End: 2024-09-11

## 2024-09-11 NOTE — TELEPHONE ENCOUNTER
Talked to Roula and she stated that she is healed and does not need an appt. Cancelled 9/13/24 appt per her.